# Patient Record
Sex: MALE | Race: WHITE | Employment: FULL TIME | URBAN - METROPOLITAN AREA
[De-identification: names, ages, dates, MRNs, and addresses within clinical notes are randomized per-mention and may not be internally consistent; named-entity substitution may affect disease eponyms.]

---

## 2017-01-10 ENCOUNTER — OFFICE VISIT (OUTPATIENT)
Dept: NEUROLOGY | Facility: CLINIC | Age: 52
End: 2017-01-10

## 2017-01-10 VITALS
BODY MASS INDEX: 29.52 KG/M2 | RESPIRATION RATE: 16 BRPM | DIASTOLIC BLOOD PRESSURE: 74 MMHG | WEIGHT: 230 LBS | HEIGHT: 74 IN | SYSTOLIC BLOOD PRESSURE: 124 MMHG | HEART RATE: 68 BPM

## 2017-01-10 DIAGNOSIS — G25.0 BENIGN ESSENTIAL TREMOR SYNDROME: Primary | ICD-10-CM

## 2017-01-10 PROCEDURE — 99213 OFFICE O/P EST LOW 20 MIN: CPT | Performed by: OTHER

## 2017-01-10 NOTE — PROGRESS NOTES
[facility] with [address]  7/13/1965  Primary Care Provider:  Eveline Lema MD    1/10/2017    45year old yo patient being seen for:  tremors    Responds to Alprazolam and k 7 days. , Disp: 135 tablet, Rfl: 1  PRN:     Allergies:  No Known Allergies      During today's visit, the following items were reviewed: medications, and the following relevant information are as noted in appropriate sections above and below.       EXAM:  B

## 2017-01-10 NOTE — PATIENT INSTRUCTIONS
Refill policies:    • Allow 2 business days for refills; controlled substances may take longer.   • Contact your pharmacy at least 5 days prior to running out of medication and have them send an electronic request or submit request through the “request re your physician has recommended that you have a procedure or additional testing performed. [facility]) will contact your insurance carrier to obtain pre-certification or prior authorization.     Unfortunately, MANDY has seen an increas

## 2017-01-27 PROCEDURE — 81003 URINALYSIS AUTO W/O SCOPE: CPT | Performed by: INTERNAL MEDICINE

## 2017-01-30 ENCOUNTER — APPOINTMENT (OUTPATIENT)
Dept: LAB | Age: 52
End: 2017-01-30
Attending: FAMILY MEDICINE
Payer: COMMERCIAL

## 2017-01-30 ENCOUNTER — OFFICE VISIT (OUTPATIENT)
Dept: FAMILY MEDICINE CLINIC | Facility: CLINIC | Age: 52
End: 2017-01-30

## 2017-01-30 VITALS
SYSTOLIC BLOOD PRESSURE: 118 MMHG | DIASTOLIC BLOOD PRESSURE: 82 MMHG | WEIGHT: 232 LBS | HEART RATE: 72 BPM | RESPIRATION RATE: 16 BRPM | HEIGHT: 74 IN | BODY MASS INDEX: 29.77 KG/M2 | TEMPERATURE: 98 F

## 2017-01-30 DIAGNOSIS — Z12.5 SCREENING FOR PROSTATE CANCER: ICD-10-CM

## 2017-01-30 DIAGNOSIS — G25.0 BENIGN ESSENTIAL TREMOR SYNDROME: ICD-10-CM

## 2017-01-30 DIAGNOSIS — Z13.220 SCREENING FOR LIPOID DISORDERS: ICD-10-CM

## 2017-01-30 DIAGNOSIS — L40.50 PSORIATIC ARTHROPATHY (HCC): ICD-10-CM

## 2017-01-30 DIAGNOSIS — Z00.00 ANNUAL PHYSICAL EXAM: Primary | ICD-10-CM

## 2017-01-30 DIAGNOSIS — L40.0 PLAQUE PSORIASIS: ICD-10-CM

## 2017-01-30 LAB
CHOLEST SMN-MCNC: 163 MG/DL (ref ?–200)
HDLC SERPL-MCNC: 47 MG/DL (ref 45–?)
HDLC SERPL: 3.47 {RATIO} (ref ?–4.97)
LDLC SERPL CALC-MCNC: 83 MG/DL (ref ?–130)
NONHDLC SERPL-MCNC: 116 MG/DL (ref ?–130)
PSA SERPL-MCNC: 1.17 NG/ML (ref 0.01–4)
TRIGLYCERIDES: 163 MG/DL (ref ?–150)
VLDL: 33 MG/DL (ref 5–40)

## 2017-01-30 PROCEDURE — 36415 COLL VENOUS BLD VENIPUNCTURE: CPT

## 2017-01-30 PROCEDURE — 99396 PREV VISIT EST AGE 40-64: CPT | Performed by: FAMILY MEDICINE

## 2017-01-30 PROCEDURE — 80061 LIPID PANEL: CPT

## 2017-01-30 PROCEDURE — 84153 ASSAY OF PSA TOTAL: CPT

## 2017-01-30 NOTE — PROGRESS NOTES
Peter Willoughby is a 45year old male who presents for a complete physical exam.   HPI:    Dr. Matt Knight managing thyroid. Repeat ultrasound pending in  6 months. Believes he is taking 2,000 IU vitamin d daily.      Wt Readings from Last 6 E daily. Disp: 90 tablet Rfl: 1   Lansoprazole 15 MG Oral Capsule Delayed Release Take 1 capsule (15 mg total) by mouth daily.  Disp: 90 capsule Rfl: 0   Loratadine (CLARITIN OR)  Disp:  Rfl:    Omega-3 Fatty Acids (FISH OIL OR) 1 qd Disp:  Rfl:    Cholecalci rashes,no suspicious lesions  HEENT: atraumatic, normocephalic,ears and throat are clear  EYES:PERRLA, EOMI, normal nondilated fundoscopic exam,conjunctiva are clear  NECK: supple,no adenopathy  LUNGS: clear to auscultation  CARDIO: RRR without murmur  GI:

## 2017-03-03 RX ORDER — TADALAFIL 10 MG
TABLET ORAL
Qty: 18 TABLET | Refills: 0 | Status: SHIPPED | OUTPATIENT
Start: 2017-03-03 | End: 2017-05-17

## 2017-04-28 ENCOUNTER — TELEPHONE (OUTPATIENT)
Dept: FAMILY MEDICINE CLINIC | Facility: CLINIC | Age: 52
End: 2017-04-28

## 2017-04-28 PROCEDURE — 81003 URINALYSIS AUTO W/O SCOPE: CPT | Performed by: INTERNAL MEDICINE

## 2017-05-16 DIAGNOSIS — G47.00 INSOMNIA, UNSPECIFIED: Primary | ICD-10-CM

## 2017-05-16 RX ORDER — ALPRAZOLAM 0.25 MG/1
TABLET ORAL
Qty: 40 TABLET | Refills: 0 | Status: SHIPPED | OUTPATIENT
Start: 2017-05-16 | End: 2017-12-02

## 2017-05-16 NOTE — TELEPHONE ENCOUNTER
From: Jackie Melgar  To:  Liat Restrepo MD  Sent: 5/16/2017 7:08 AM CDT  Subject: Medication Renewal Request    Original authorizing provider: MD Jackie Allan would like a refill of the following medications:  ALPRAZOLAM 0.25 MG

## 2017-05-16 NOTE — TELEPHONE ENCOUNTER
Medication: Alprazolam    Date of last refill: 12/01/16  [place] 86/36/61    Last office visit: 01/10/17  Due back to clinic per last office note:  1 year  Date next office visit scheduled:  No appointment scheduled at t

## 2017-05-17 RX ORDER — TADALAFIL 10 MG/1
10 TABLET ORAL
Qty: 18 TABLET | Refills: 0 | Status: SHIPPED | OUTPATIENT
Start: 2017-05-17 | End: 2017-08-08

## 2017-05-17 NOTE — TELEPHONE ENCOUNTER
From: Shaka Melgar  To: Nikolas Ariza MD  Sent: 5/16/2017 7:08 AM CDT  Subject: Medication Renewal Request    Original authorizing provider: MD Shaka Christopher would like a refill of the following medications:  CIALIS 10 MG Oral

## 2017-08-01 PROCEDURE — 81003 URINALYSIS AUTO W/O SCOPE: CPT | Performed by: INTERNAL MEDICINE

## 2017-08-10 RX ORDER — TADALAFIL 10 MG/1
10 TABLET ORAL
Qty: 18 TABLET | Refills: 0 | Status: SHIPPED
Start: 2017-08-10 | End: 2018-09-27

## 2017-08-10 NOTE — TELEPHONE ENCOUNTER
From: Edith Melgar  Sent: 8/8/2017 5:52 AM CDT  Subject: Medication Renewal Request    Edith Martin. Talia would like a refill of the following medications:   Tadalafil (CIALIS) 10 MG Oral Tab Iline Felty, MD]    Preferred pharmacy: [facility]/PHARMACY #[id number] -

## 2017-11-14 PROCEDURE — 81003 URINALYSIS AUTO W/O SCOPE: CPT | Performed by: INTERNAL MEDICINE

## 2017-12-02 DIAGNOSIS — G47.00 INSOMNIA, UNSPECIFIED: ICD-10-CM

## 2017-12-04 RX ORDER — ALPRAZOLAM 0.25 MG/1
TABLET ORAL
Qty: 40 TABLET | Refills: 0 | Status: SHIPPED
Start: 2017-12-04 | End: 2018-01-08

## 2017-12-04 NOTE — TELEPHONE ENCOUNTER
From: Cristy Smith  Sent: 12/2/2017 4:33 PM CST  Subject: Medication Renewal Request    Jamie Colvin.  Talia would like a refill of the following medications:     ALPRAZolam 0.25 MG Oral Tab Nicho Turner MD]    Preferred pharmacy: [facility]/PHARMACY #[id number] - [place]

## 2017-12-04 NOTE — TELEPHONE ENCOUNTER
Medication: Alprazolam 0.25 mg     Date of last refill: 05/16/17  [place] 36/81/69     Last office visit: 01/10/17  Due back to clinic per last office note:  1 year  Date next office visit scheduled:  No appointment sche

## 2017-12-05 NOTE — TELEPHONE ENCOUNTER
Prescription approved for Alprazolam and faxed to [facility] pharmacy with receipt confirmation.

## 2018-01-08 ENCOUNTER — OFFICE VISIT (OUTPATIENT)
Dept: NEUROLOGY | Facility: CLINIC | Age: 53
End: 2018-01-08

## 2018-01-08 VITALS
HEIGHT: 74 IN | RESPIRATION RATE: 16 BRPM | HEART RATE: 81 BPM | DIASTOLIC BLOOD PRESSURE: 83 MMHG | WEIGHT: 237 LBS | SYSTOLIC BLOOD PRESSURE: 127 MMHG | BODY MASS INDEX: 30.42 KG/M2

## 2018-01-08 DIAGNOSIS — G25.0 BENIGN ESSENTIAL TREMOR SYNDROME: Primary | ICD-10-CM

## 2018-01-08 PROCEDURE — 99214 OFFICE O/P EST MOD 30 MIN: CPT | Performed by: OTHER

## 2018-01-08 RX ORDER — ALPRAZOLAM 0.25 MG/1
TABLET ORAL
Qty: 60 TABLET | Refills: 5 | Status: SHIPPED | OUTPATIENT
Start: 2018-01-08 | End: 2019-11-14

## 2018-01-08 NOTE — PROGRESS NOTES
[facility] with [address]  7/13/1965  Primary Care Provider:  Jhonatan Mckee MD    1/8/2018    47year old yo patient being seen for:  tremors    Steady and no worsening, medi sounds  Pink nailbeds no Cyanosis, pulses palpated    Postural and kinetic tremors    IMPRESSION & PLANS:  Benign essential tremor syndrome  (primary encounter diagnosis)    Discussion on the case:  Stable and renew medications    Diagnostics:  none    Jemal

## 2018-01-16 ENCOUNTER — TELEPHONE (OUTPATIENT)
Dept: FAMILY MEDICINE CLINIC | Facility: CLINIC | Age: 53
End: 2018-01-16

## 2018-01-22 NOTE — TELEPHONE ENCOUNTER
Incoming fax received from [employer]. Additional information needed(questionier), form completed and faxed.

## 2018-01-24 NOTE — TELEPHONE ENCOUNTER
Incoming fax received from [employer]. Letter of determination. Cialis 10 mg approved coverage 1/22/2018 through 1/22/2021. Member id: [id number]  Detailed message left on patient voicemail.

## 2018-03-26 PROCEDURE — 81001 URINALYSIS AUTO W/SCOPE: CPT | Performed by: INTERNAL MEDICINE

## 2018-03-29 PROBLEM — M67.432 GANGLION CYST OF DORSUM OF LEFT WRIST: Status: ACTIVE | Noted: 2018-03-29

## 2018-04-11 PROCEDURE — 88304 TISSUE EXAM BY PATHOLOGIST: CPT | Performed by: ORTHOPAEDIC SURGERY

## 2018-05-04 RX ORDER — TADALAFIL 10 MG/1
10 TABLET ORAL
Qty: 18 TABLET | Refills: 0
Start: 2018-05-04

## 2018-06-06 ENCOUNTER — OFFICE VISIT (OUTPATIENT)
Dept: FAMILY MEDICINE CLINIC | Facility: CLINIC | Age: 53
End: 2018-06-06

## 2018-06-06 VITALS
TEMPERATURE: 97 F | SYSTOLIC BLOOD PRESSURE: 130 MMHG | BODY MASS INDEX: 32 KG/M2 | OXYGEN SATURATION: 97 % | HEART RATE: 74 BPM | WEIGHT: 252 LBS | RESPIRATION RATE: 16 BRPM | DIASTOLIC BLOOD PRESSURE: 82 MMHG

## 2018-06-06 DIAGNOSIS — H00.014 HORDEOLUM EXTERNUM OF LEFT UPPER EYELID: Primary | ICD-10-CM

## 2018-06-06 PROCEDURE — 99213 OFFICE O/P EST LOW 20 MIN: CPT | Performed by: FAMILY MEDICINE

## 2018-06-06 RX ORDER — ERYTHROMYCIN 5 MG/G
OINTMENT OPHTHALMIC
Qty: 1 TUBE | Refills: 0 | Status: SHIPPED | OUTPATIENT
Start: 2018-06-06 | End: 2018-07-12

## 2018-06-06 RX ORDER — DICLOFENAC SODIUM 75 MG/1
75 TABLET, DELAYED RELEASE ORAL DAILY
COMMUNITY
End: 2019-02-25

## 2018-06-07 NOTE — PROGRESS NOTES
[facility] Office Note  Chief Complaint:   Patient presents with:   Eye Visual Problem (opthalmic): left eye x 1 day  Body ache and/or chills: x 1 day      HPI:   This is a 47year old male coming in for a stye in the left upper ey EVERY 7 DAYS.  Disp: 135 tablet Rfl: 0   ALPRAZolam 0.25 MG Oral Tab Take 1 tablet by mouth twice a day as needed for sleep Disp: 60 tablet Rfl: 5   Tadalafil (CIALIS) 10 MG Oral Tab Take 1 tablet (10 mg total) by mouth daily as needed for Erectile Dysfunct ulcerations, no dental abnormalities noted.   LUNGS: clear to auscultation bilaterally, no rales/rhonchi/wheezing  HEART:  Regular rate and rhythm, no murmurs, rubs or gallops  ABDOMEN:  Soft, nondistended, nontender, bowel sounds normal in all 4 quadrants, GANGLION / DOS 04/11/18 / EXP 07/10/18      DARREN VILLARREAL DO  6/7/2018  6:56 AM

## 2018-07-12 ENCOUNTER — OFFICE VISIT (OUTPATIENT)
Dept: NEUROLOGY | Facility: CLINIC | Age: 53
End: 2018-07-12

## 2018-07-12 VITALS
HEIGHT: 74 IN | HEART RATE: 72 BPM | BODY MASS INDEX: 32.34 KG/M2 | WEIGHT: 252 LBS | RESPIRATION RATE: 16 BRPM | DIASTOLIC BLOOD PRESSURE: 81 MMHG | SYSTOLIC BLOOD PRESSURE: 129 MMHG

## 2018-07-12 DIAGNOSIS — G25.0 BENIGN ESSENTIAL TREMOR SYNDROME: Primary | ICD-10-CM

## 2018-07-12 PROCEDURE — 99214 OFFICE O/P EST MOD 30 MIN: CPT | Performed by: OTHER

## 2018-07-12 RX ORDER — ZONISAMIDE 25 MG/1
25 CAPSULE ORAL DAILY
Qty: 60 CAPSULE | Refills: 5 | Status: SHIPPED | OUTPATIENT
Start: 2018-07-12 | End: 2019-07-08

## 2018-07-12 NOTE — PROGRESS NOTES
[employer] with [address]  7/13/1965  Primary Care Provider:  Harvinder Flores MD    7/12/2018  Accompanied visit:  (x) No ( ) yes    47year old yo patient being seen for:  Jemal sections above and below.       EXAM:  /81 (BP Location: Left arm, Patient Position: Sitting, Cuff Size: large)   Pulse 72   Resp 16   Ht 74\"   Wt 252 lb   BMI 32.35 kg/m²   Looks stated age  Pink conjunctiva anicteric sclerae, moist mucosa  No LAD, [place]  7/12/2018, Time completed 11:09 AM    Decision making:  (  ) labs reviewed/ordered - 1  (  ) new diagnosis: - 1  (  ) Images & studies independently reviewed -non F2F  (  ) Case/studies discussed with other caregivers - -non F2F  (  ) Telephone

## 2018-07-12 NOTE — PATIENT INSTRUCTIONS
Refill policies:    • Allow 2-3 business days for refills; controlled substances may take longer.   • Contact your pharmacy at least 5 days prior to running out of medication and have them send an electronic request or submit request through the “request re entire amount billed. Precertification and Prior Authorizations: If your physician has recommended that you have a procedure or additional testing performed.   [facility]) will contact your insurance carrier to obtain pre-certi

## 2018-07-19 ENCOUNTER — HOSPITAL ENCOUNTER (OUTPATIENT)
Age: 53
Discharge: HOME | End: 2018-07-19
Payer: COMMERCIAL

## 2018-07-19 VITALS
BODY MASS INDEX: 31 KG/M2 | TEMPERATURE: 98 F | SYSTOLIC BLOOD PRESSURE: 124 MMHG | OXYGEN SATURATION: 97 % | DIASTOLIC BLOOD PRESSURE: 93 MMHG | HEART RATE: 78 BPM | WEIGHT: 240 LBS | RESPIRATION RATE: 16 BRPM

## 2018-07-19 DIAGNOSIS — K12.2 UVULITIS: Primary | ICD-10-CM

## 2018-07-19 LAB — POCT RAPID STREP: NEGATIVE

## 2018-07-19 PROCEDURE — 87081 CULTURE SCREEN ONLY: CPT | Performed by: PHYSICIAN ASSISTANT

## 2018-07-19 PROCEDURE — 86665 EPSTEIN-BARR CAPSID VCA: CPT | Performed by: PHYSICIAN ASSISTANT

## 2018-07-19 PROCEDURE — 99214 OFFICE O/P EST MOD 30 MIN: CPT

## 2018-07-19 PROCEDURE — 36415 COLL VENOUS BLD VENIPUNCTURE: CPT

## 2018-07-19 PROCEDURE — 87430 STREP A AG IA: CPT | Performed by: PHYSICIAN ASSISTANT

## 2018-07-19 RX ORDER — METHYLPREDNISOLONE 4 MG/1
TABLET ORAL
Qty: 1 PACKAGE | Refills: 0 | Status: SHIPPED | OUTPATIENT
Start: 2018-07-19 | End: 2018-07-31 | Stop reason: ALTCHOICE

## 2018-07-19 RX ORDER — AMOXICILLIN 875 MG/1
875 TABLET, COATED ORAL 2 TIMES DAILY
Qty: 20 TABLET | Refills: 0 | Status: SHIPPED | OUTPATIENT
Start: 2018-07-19 | End: 2018-07-29

## 2018-07-19 NOTE — ED INITIAL ASSESSMENT (HPI)
Patient reports that he has had a persistent sore throat as well as other symptoms. Patient reports he has been feeling tired, and has also had some ear issues. Patient reports he has had symptoms for about 9 days.  Patient reports he had a strep culture

## 2018-07-19 NOTE — ED PROVIDER NOTES
Patient Seen in: [facility]    History   Patient presents with:  Sore Throat    Stated Complaint: sore throat x 9 days    HPI    Vane Camejo is a 25-year-old male who presents today for evaluation of sore throat x 9 days.   He has a pas %  O2 Device: None (Room air)    Current:BP (!) 124/93   Pulse 78   Temp 97.9 °F (36.6 °C) (Temporal)   Resp 16   Wt 108.9 kg   SpO2 97%   BMI 30.81 kg/m²         Physical Exam   Constitutional: He is oriented to person, place, and time.  He appears well-de discharge instructions are given and discussed. Discharge medications are discussed. The patient is in good condition throughout the visit today and remains so upon discharge. I discuss the plan of care with the patient, who expresses understanding.   All

## 2018-07-23 LAB
EBV VCA IGG: POSITIVE
EBV VCA IGM: NEGATIVE

## 2018-07-31 PROBLEM — S20.211A CONTUSION OF RIB ON RIGHT SIDE, INITIAL ENCOUNTER: Status: ACTIVE | Noted: 2018-07-31

## 2018-07-31 PROBLEM — S43.61XA SPRAIN OF RIGHT STERNOCLAVICULAR JOINT, INITIAL ENCOUNTER: Status: ACTIVE | Noted: 2018-07-31

## 2018-08-07 ENCOUNTER — OFFICE VISIT (OUTPATIENT)
Dept: FAMILY MEDICINE CLINIC | Facility: CLINIC | Age: 53
End: 2018-08-07
Payer: COMMERCIAL

## 2018-08-07 ENCOUNTER — LAB ENCOUNTER (OUTPATIENT)
Dept: LAB | Age: 53
End: 2018-08-07
Attending: FAMILY MEDICINE
Payer: COMMERCIAL

## 2018-08-07 VITALS
SYSTOLIC BLOOD PRESSURE: 128 MMHG | RESPIRATION RATE: 16 BRPM | WEIGHT: 249 LBS | HEART RATE: 69 BPM | TEMPERATURE: 98 F | BODY MASS INDEX: 32 KG/M2 | DIASTOLIC BLOOD PRESSURE: 84 MMHG

## 2018-08-07 DIAGNOSIS — Z79.899 ENCOUNTER FOR LONG-TERM (CURRENT) USE OF HIGH-RISK MEDICATION: ICD-10-CM

## 2018-08-07 DIAGNOSIS — J35.3 HYPERTROPHY OF TONSILS WITH HYPERTROPHY OF ADENOIDS: Primary | ICD-10-CM

## 2018-08-07 DIAGNOSIS — J35.3 HYPERTROPHY OF TONSILS WITH HYPERTROPHY OF ADENOIDS: ICD-10-CM

## 2018-08-07 DIAGNOSIS — L40.50 PSORIATIC ARTHROPATHY (HCC): ICD-10-CM

## 2018-08-07 DIAGNOSIS — Z92.29 PERSONAL HISTORY OF HIGH RISK MEDICATION TREATMENT: ICD-10-CM

## 2018-08-07 LAB
ALBUMIN SERPL-MCNC: 3.9 G/DL (ref 3.5–4.8)
ALBUMIN/GLOB SERPL: 1.1 {RATIO} (ref 1–2)
ALP LIVER SERPL-CCNC: 84 U/L (ref 45–117)
ALT SERPL-CCNC: 78 U/L (ref 17–63)
ANION GAP SERPL CALC-SCNC: 6 MMOL/L (ref 0–18)
AST SERPL-CCNC: 36 U/L (ref 15–41)
BILIRUB SERPL-MCNC: 0.8 MG/DL (ref 0.1–2)
BUN BLD-MCNC: 15 MG/DL (ref 8–20)
BUN/CREAT SERPL: 12.1 (ref 10–20)
CALCIUM BLD-MCNC: 9.1 MG/DL (ref 8.3–10.3)
CHLORIDE SERPL-SCNC: 108 MMOL/L (ref 101–111)
CO2 SERPL-SCNC: 27 MMOL/L (ref 22–32)
CREAT BLD-MCNC: 1.24 MG/DL (ref 0.7–1.3)
CRP SERPL-MCNC: <0.29 MG/DL (ref ?–1)
GLOBULIN PLAS-MCNC: 3.6 G/DL (ref 2.5–3.7)
GLUCOSE BLD-MCNC: 91 MG/DL (ref 70–99)
LDH: 246 U/L (ref 84–249)
M PROTEIN MFR SERPL ELPH: 7.5 G/DL (ref 6.1–8.3)
OSMOLALITY SERPL CALC.SUM OF ELEC: 292 MOSM/KG (ref 275–295)
POTASSIUM SERPL-SCNC: 4 MMOL/L (ref 3.6–5.1)
SODIUM SERPL-SCNC: 141 MMOL/L (ref 136–144)

## 2018-08-07 PROCEDURE — 99213 OFFICE O/P EST LOW 20 MIN: CPT | Performed by: FAMILY MEDICINE

## 2018-08-07 PROCEDURE — 83615 LACTATE (LD) (LDH) ENZYME: CPT | Performed by: FAMILY MEDICINE

## 2018-08-07 RX ORDER — CETIRIZINE HYDROCHLORIDE 5 MG/1
5 TABLET ORAL DAILY
COMMUNITY

## 2018-08-07 NOTE — PROGRESS NOTES
Amanda Malloy is a 46year old male. HPI:   Patient is a 62-year-old male who has had throat symptoms for 2-3 weeks. He had been seen at the end of July and had a strep test that was negative and EBV IgM IgG testing was negative for acute infection.   He otherwise  SKIN: denies any unusual skin lesions or rashes  RESPIRATORY: denies shortness of breath with exertion  CARDIOVASCULAR: denies chest pain on exertion  GI: denies abdominal pain and denies heartburn  NEURO: denies headaches    EXAM:   /84

## 2018-08-08 LAB
BASOPHILS # BLD AUTO: 0.05 X10(3) UL (ref 0–0.1)
BASOPHILS NFR BLD AUTO: 0.7 %
BILIRUB UR QL STRIP.AUTO: NEGATIVE
CLARITY UR REFRACT.AUTO: CLEAR
COLOR UR AUTO: YELLOW
EOSINOPHIL # BLD AUTO: 0.25 X10(3) UL (ref 0–0.3)
EOSINOPHIL NFR BLD AUTO: 3.6 %
ERYTHROCYTE [DISTWIDTH] IN BLOOD BY AUTOMATED COUNT: 13.8 % (ref 11.5–16)
GLUCOSE UR STRIP.AUTO-MCNC: NEGATIVE MG/DL
HCT VFR BLD AUTO: 48.3 % (ref 37–53)
HGB BLD-MCNC: 16.6 G/DL (ref 13–17)
IMMATURE GRANULOCYTE COUNT: 0.01 X10(3) UL (ref 0–1)
IMMATURE GRANULOCYTE RATIO %: 0.1 %
KETONES UR STRIP.AUTO-MCNC: NEGATIVE MG/DL
LEUKOCYTE ESTERASE UR QL STRIP.AUTO: NEGATIVE
LYMPHOCYTES # BLD AUTO: 2.4 X10(3) UL (ref 0.9–4)
LYMPHOCYTES NFR BLD AUTO: 34.6 %
MCH RBC QN AUTO: 32.6 PG (ref 27–33.2)
MCHC RBC AUTO-ENTMCNC: 34.4 G/DL (ref 31–37)
MCV RBC AUTO: 94.9 FL (ref 80–99)
MONOCYTES # BLD AUTO: 0.68 X10(3) UL (ref 0.1–1)
MONOCYTES NFR BLD AUTO: 9.8 %
NEUTROPHIL ABS PRELIM: 3.55 X10 (3) UL (ref 1.3–6.7)
NEUTROPHILS # BLD AUTO: 3.55 X10(3) UL (ref 1.3–6.7)
NEUTROPHILS NFR BLD AUTO: 51.2 %
NITRITE UR QL STRIP.AUTO: NEGATIVE
PH UR STRIP.AUTO: 5 [PH] (ref 4.5–8)
PLATELET # BLD AUTO: 259 10(3)UL (ref 150–450)
PROT UR STRIP.AUTO-MCNC: NEGATIVE MG/DL
RBC # BLD AUTO: 5.09 X10(6)UL (ref 4.3–5.7)
RBC UR QL AUTO: NEGATIVE
RED CELL DISTRIBUTION WIDTH-SD: 47.5 FL (ref 35.1–46.3)
SED RATE-ML: 6 MM/HR (ref 0–12)
SP GR UR STRIP.AUTO: 1.03 (ref 1–1.03)
UROBILINOGEN UR STRIP.AUTO-MCNC: <2 MG/DL
WBC # BLD AUTO: 6.9 X10(3) UL (ref 4–13)

## 2018-08-08 NOTE — PROGRESS NOTES
no inflammation, no serious medication side effects  single liver function test is mildly elevated - not serious   no changes in medications recommended for now

## 2018-08-11 ENCOUNTER — HOSPITAL ENCOUNTER (OUTPATIENT)
Dept: CT IMAGING | Facility: HOSPITAL | Age: 53
Discharge: HOME | End: 2018-08-11
Attending: FAMILY MEDICINE
Payer: COMMERCIAL

## 2018-08-11 DIAGNOSIS — J35.3 HYPERTROPHY OF TONSILS WITH HYPERTROPHY OF ADENOIDS: ICD-10-CM

## 2018-08-11 PROCEDURE — 70491 CT SOFT TISSUE NECK W/DYE: CPT | Performed by: FAMILY MEDICINE

## 2018-08-14 DIAGNOSIS — J35.3 HYPERTROPHY OF TONSILS WITH HYPERTROPHY OF ADENOIDS: Primary | ICD-10-CM

## 2018-08-30 ENCOUNTER — HOSPITAL ENCOUNTER (OUTPATIENT)
Dept: ULTRASOUND IMAGING | Facility: HOSPITAL | Age: 53
Discharge: HOME | End: 2018-08-30
Attending: OTOLARYNGOLOGY
Payer: COMMERCIAL

## 2018-08-30 DIAGNOSIS — E07.9 THYROID DYSFUNCTION: ICD-10-CM

## 2018-08-30 DIAGNOSIS — E04.1 THYROID NODULE: ICD-10-CM

## 2018-08-30 PROCEDURE — 76536 US EXAM OF HEAD AND NECK: CPT | Performed by: OTOLARYNGOLOGY

## 2018-09-18 ENCOUNTER — TELEPHONE (OUTPATIENT)
Dept: FAMILY MEDICINE CLINIC | Facility: CLINIC | Age: 53
End: 2018-09-18

## 2018-09-18 NOTE — TELEPHONE ENCOUNTER
[facility].  Patient is scheduled to have Tonsillectomy and Uvulectomy with Dr. José Luis Caba on 10/5/2018. Outgoing call to patient, left message to call back.

## 2018-09-27 ENCOUNTER — OFFICE VISIT (OUTPATIENT)
Dept: FAMILY MEDICINE CLINIC | Facility: CLINIC | Age: 53
End: 2018-09-27
Payer: COMMERCIAL

## 2018-09-27 VITALS
SYSTOLIC BLOOD PRESSURE: 128 MMHG | HEIGHT: 74 IN | HEART RATE: 72 BPM | WEIGHT: 249 LBS | RESPIRATION RATE: 16 BRPM | TEMPERATURE: 98 F | BODY MASS INDEX: 31.95 KG/M2 | DIASTOLIC BLOOD PRESSURE: 84 MMHG

## 2018-09-27 DIAGNOSIS — J35.3 HYPERTROPHY OF TONSILS WITH HYPERTROPHY OF ADENOIDS: ICD-10-CM

## 2018-09-27 DIAGNOSIS — L40.50 PSORIATIC ARTHROPATHY (HCC): ICD-10-CM

## 2018-09-27 DIAGNOSIS — Z01.818 PREOPERATIVE CLEARANCE: Primary | ICD-10-CM

## 2018-09-27 DIAGNOSIS — L40.0 PLAQUE PSORIASIS: ICD-10-CM

## 2018-09-27 DIAGNOSIS — N52.8 OTHER MALE ERECTILE DYSFUNCTION: ICD-10-CM

## 2018-09-27 PROCEDURE — 99243 OFF/OP CNSLTJ NEW/EST LOW 30: CPT | Performed by: FAMILY MEDICINE

## 2018-09-27 RX ORDER — TADALAFIL 10 MG/1
10 TABLET ORAL
Qty: 18 TABLET | Refills: 0 | Status: SHIPPED | OUTPATIENT
Start: 2018-09-27 | End: 2018-12-28

## 2018-09-27 NOTE — PROGRESS NOTES
Soledad Gaines is a 46year old male who presents for a pre-operative physical exam. Patient is to have tonsillectomy and uvulectomy, to be done by Dr. Ki Herman at [facility] on 10/5/2018.       HPI:   Pt complains of ADALBERTO and hypertrophy of the tonsils OTHER SURGICAL HISTORY      Comment:  Upper Gastrointestinal Endoscopy   No family history on file. Social History:   Social History    Tobacco Use      Smoking status: Never Smoker      Smokeless tobacco: Never Used    Alcohol use:  Yes      Alcohol/week

## 2018-10-05 ENCOUNTER — HOSPITAL ENCOUNTER (OUTPATIENT)
Facility: HOSPITAL | Age: 53
Setting detail: HOSPITAL OUTPATIENT SURGERY
Discharge: HOME | End: 2018-10-05
Attending: OTOLARYNGOLOGY | Admitting: OTOLARYNGOLOGY
Payer: COMMERCIAL

## 2018-10-05 ENCOUNTER — ANESTHESIA EVENT (OUTPATIENT)
Dept: SURGERY | Facility: HOSPITAL | Age: 53
End: 2018-10-05
Payer: COMMERCIAL

## 2018-10-05 ENCOUNTER — ANESTHESIA (OUTPATIENT)
Dept: SURGERY | Facility: HOSPITAL | Age: 53
End: 2018-10-05
Payer: COMMERCIAL

## 2018-10-05 VITALS
WEIGHT: 246.94 LBS | DIASTOLIC BLOOD PRESSURE: 85 MMHG | HEIGHT: 74 IN | BODY MASS INDEX: 31.69 KG/M2 | OXYGEN SATURATION: 95 % | RESPIRATION RATE: 18 BRPM | SYSTOLIC BLOOD PRESSURE: 144 MMHG | HEART RATE: 73 BPM | TEMPERATURE: 98 F

## 2018-10-05 DIAGNOSIS — J35.1 HYPERTROPHY OF TONSILS ALONE: ICD-10-CM

## 2018-10-05 DIAGNOSIS — Q38.5: ICD-10-CM

## 2018-10-05 DIAGNOSIS — G47.33 OBSTRUCTIVE SLEEP APNEA (ADULT) (PEDIATRIC): ICD-10-CM

## 2018-10-05 PROCEDURE — 88184 FLOWCYTOMETRY/ TC 1 MARKER: CPT | Performed by: OTOLARYNGOLOGY

## 2018-10-05 PROCEDURE — 0C5PXZZ DESTRUCTION OF TONSILS, EXTERNAL APPROACH: ICD-10-PCS | Performed by: OTOLARYNGOLOGY

## 2018-10-05 PROCEDURE — 88305 TISSUE EXAM BY PATHOLOGIST: CPT | Performed by: OTOLARYNGOLOGY

## 2018-10-05 PROCEDURE — 88185 FLOWCYTOMETRY/TC ADD-ON: CPT | Performed by: OTOLARYNGOLOGY

## 2018-10-05 PROCEDURE — 88333 PATH CONSLTJ SURG CYTO XM 1: CPT | Performed by: OTOLARYNGOLOGY

## 2018-10-05 PROCEDURE — 88304 TISSUE EXAM BY PATHOLOGIST: CPT | Performed by: OTOLARYNGOLOGY

## 2018-10-05 PROCEDURE — 0CBNXZZ EXCISION OF UVULA, EXTERNAL APPROACH: ICD-10-PCS | Performed by: OTOLARYNGOLOGY

## 2018-10-05 PROCEDURE — 88307 TISSUE EXAM BY PATHOLOGIST: CPT | Performed by: OTOLARYNGOLOGY

## 2018-10-05 RX ORDER — SODIUM CHLORIDE, SODIUM LACTATE, POTASSIUM CHLORIDE, CALCIUM CHLORIDE 600; 310; 30; 20 MG/100ML; MG/100ML; MG/100ML; MG/100ML
INJECTION, SOLUTION INTRAVENOUS CONTINUOUS
Status: DISCONTINUED | OUTPATIENT
Start: 2018-10-05 | End: 2018-10-05

## 2018-10-05 RX ORDER — NALOXONE HYDROCHLORIDE 0.4 MG/ML
80 INJECTION, SOLUTION INTRAMUSCULAR; INTRAVENOUS; SUBCUTANEOUS AS NEEDED
Status: DISCONTINUED | OUTPATIENT
Start: 2018-10-05 | End: 2018-10-05

## 2018-10-05 RX ORDER — BUPIVACAINE HYDROCHLORIDE 5 MG/ML
INJECTION, SOLUTION EPIDURAL; INTRACAUDAL AS NEEDED
Status: DISCONTINUED | OUTPATIENT
Start: 2018-10-05 | End: 2018-10-05 | Stop reason: HOSPADM

## 2018-10-05 RX ORDER — ACETAMINOPHEN 500 MG
1000 TABLET ORAL ONCE AS NEEDED
Status: DISCONTINUED | OUTPATIENT
Start: 2018-10-05 | End: 2018-10-05

## 2018-10-05 RX ORDER — ACETAMINOPHEN 500 MG
500 TABLET ORAL EVERY 6 HOURS PRN
COMMUNITY
End: 2018-11-12

## 2018-10-05 RX ORDER — MORPHINE SULFATE 4 MG/ML
2 INJECTION, SOLUTION INTRAMUSCULAR; INTRAVENOUS EVERY 5 MIN PRN
Status: DISCONTINUED | OUTPATIENT
Start: 2018-10-05 | End: 2018-10-05

## 2018-10-05 RX ORDER — MORPHINE SULFATE 4 MG/ML
INJECTION, SOLUTION INTRAMUSCULAR; INTRAVENOUS
Status: COMPLETED
Start: 2018-10-05 | End: 2018-10-05

## 2018-10-05 RX ORDER — ACETAMINOPHEN 500 MG
1000 TABLET ORAL ONCE
Status: DISCONTINUED | OUTPATIENT
Start: 2018-10-05 | End: 2018-10-05 | Stop reason: HOSPADM

## 2018-10-05 NOTE — ANESTHESIA POSTPROCEDURE EVALUATION
[address] [facility] Patient Status:  Hospital Outpatient Surgery   Age/Gender 46year old male MRN [id number]   [facility] SURGERY Attending Kimberil Lim MD   Hosp Day # 0 PCP Prateek Lema MD       Anesthesia Post-op No

## 2018-10-05 NOTE — ANESTHESIA PREPROCEDURE EVALUATION
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 Exercise tolerance: good     MET: >4                                           Endo/Other    Negative endo/other ROS.                               Pu patient meets guidelines. Patient has not taken beta blockers in last 24 hours. Post-procedure pain management plan discussed with surgeon and patient.     Comment: Risks and benefits of GA including sorethroat,allergy,nausea, vomiting,dental trauma,pain

## 2018-10-05 NOTE — OPERATIVE REPORT
[facility]    PATIENT'S NAME: [email or web address], NITO CHARLES   ATTENDING PHYSICIAN: Chris Morse M.D. OPERATING PHYSICIAN: Chris Morse M.D.    PATIENT ACCOUNT#:   [de-identified]    LOCATION:  PREOPASCC  PRE ASCC 1 EDWP 10  MEDICAL RECORD #:   [id number] awoken from anesthetic and brought to recovery room in stable condition.     Dictated By Grupo Kuhn M.D.  d: 10/05/2018 11:14:46  t: 10/05/2018 11:59:58  [facility] [id number]  JJD/    cc: Grupo Kuhn M.D.

## 2018-10-05 NOTE — H&P
No change to h and p   Proceed with tonsil and uvulectomy  Risk and benefits discussed  Lung cta   Heart rrr

## 2018-10-05 NOTE — BRIEF OP NOTE
Pre-Operative Diagnosis: Uvular hypertrophy ; tonsil hypertrophy     Post-Operative Diagnosis: same      Procedure Performed:   Procedure(s):  BILATERAL TONSILLECTOMY AND UVULECTOMY    Surgeon(s) and Role:     Titi Carroll MD - Primary    Assistant(

## 2018-11-03 PROCEDURE — 81001 URINALYSIS AUTO W/SCOPE: CPT | Performed by: INTERNAL MEDICINE

## 2018-11-03 PROCEDURE — 86480 TB TEST CELL IMMUN MEASURE: CPT | Performed by: INTERNAL MEDICINE

## 2018-12-18 ENCOUNTER — OFFICE VISIT (OUTPATIENT)
Dept: FAMILY MEDICINE CLINIC | Facility: CLINIC | Age: 53
End: 2018-12-18
Payer: COMMERCIAL

## 2018-12-18 VITALS
HEART RATE: 60 BPM | TEMPERATURE: 98 F | BODY MASS INDEX: 31.95 KG/M2 | SYSTOLIC BLOOD PRESSURE: 120 MMHG | DIASTOLIC BLOOD PRESSURE: 76 MMHG | RESPIRATION RATE: 12 BRPM | WEIGHT: 249 LBS | HEIGHT: 74 IN

## 2018-12-18 DIAGNOSIS — R06.81 APNEA: ICD-10-CM

## 2018-12-18 DIAGNOSIS — Z13.220 SCREENING FOR LIPOID DISORDERS: ICD-10-CM

## 2018-12-18 DIAGNOSIS — L40.0 PLAQUE PSORIASIS: ICD-10-CM

## 2018-12-18 DIAGNOSIS — N52.8 OTHER MALE ERECTILE DYSFUNCTION: ICD-10-CM

## 2018-12-18 DIAGNOSIS — L40.50 PSORIATIC ARTHROPATHY (HCC): ICD-10-CM

## 2018-12-18 DIAGNOSIS — Z00.00 ANNUAL PHYSICAL EXAM: Primary | ICD-10-CM

## 2018-12-18 DIAGNOSIS — Z12.5 SCREENING FOR PROSTATE CANCER: ICD-10-CM

## 2018-12-18 PROCEDURE — 99396 PREV VISIT EST AGE 40-64: CPT | Performed by: FAMILY MEDICINE

## 2018-12-18 RX ORDER — INFLUENZA A VIRUS A/MICHIGAN/45/2015 X-275 (H1N1) ANTIGEN (FORMALDEHYDE INACTIVATED), INFLUENZA A VIRUS A/HONG KONG/4801/2014 X-263B (H3N2) ANTIGEN (FORMALDEHYDE INACTIVATED), INFLUENZA B VIRUS B/PHUKET/3073/2013 ANTIGEN (FORMALDEHYDE INACTIVATED), AND INFLUENZA B VIRUS B/BRISBANE/60/2008 ANTIGEN (FORMALDEHYDE INACTIVATED) 7.5; 7.5; 7.5; 7.5 UG/.25ML; UG/.25ML; UG/.25ML; UG/.25ML
INJECTION, SUSPENSION INTRAMUSCULAR
COMMUNITY
Start: 2018-09-15 | End: 2019-02-25

## 2018-12-18 NOTE — PROGRESS NOTES
Shanna Patton is a 46year old male who presents for a complete physical exam.   HPI:    Dr. Ki Herman managing thyroid. He had recent tonsillectomy and adenoidectomy for ADALBERTO. His wife has mentioned that his apneic periods seem less.   Hi (25 mg total) by mouth daily. Disp: 60 capsule Rfl: 5   Diclofenac Sodium 75 MG Oral Tab EC Take 75 mg by mouth daily. Disp:  Rfl:    Omeprazole Magnesium (PRILOSEC OTC) 20 MG Oral Tab EC Take 20 mg by mouth daily.  Disp:  Rfl:    ALPRAZolam 0.25 MG Oral headaches  PSYCHE: denies depression or anxiety  HEMATOLOGIC: denies hx of anemia  ENDOCRINE: denies thyroid history  ALL/ASTHMA: denies hx of allergy or asthma    EXAM:   /76   Pulse 60   Temp 97.7 °F (36.5 °C) (Oral)   Resp 12   Ht 74\"   Wt 249 lb

## 2018-12-28 DIAGNOSIS — N52.8 OTHER MALE ERECTILE DYSFUNCTION: ICD-10-CM

## 2018-12-29 ENCOUNTER — LAB ENCOUNTER (OUTPATIENT)
Dept: LAB | Age: 53
End: 2018-12-29
Attending: FAMILY MEDICINE
Payer: COMMERCIAL

## 2018-12-29 DIAGNOSIS — R73.9 HYPERGLYCEMIA: ICD-10-CM

## 2018-12-29 DIAGNOSIS — R06.81 APNEA: ICD-10-CM

## 2018-12-29 DIAGNOSIS — E07.9 THYROID DYSFUNCTION: ICD-10-CM

## 2018-12-29 DIAGNOSIS — L40.50 PSORIATIC ARTHROPATHY (HCC): ICD-10-CM

## 2018-12-29 DIAGNOSIS — L40.0 PLAQUE PSORIASIS: ICD-10-CM

## 2018-12-29 DIAGNOSIS — Z13.220 SCREENING FOR LIPOID DISORDERS: ICD-10-CM

## 2018-12-29 DIAGNOSIS — Z12.5 SCREENING FOR PROSTATE CANCER: ICD-10-CM

## 2018-12-29 DIAGNOSIS — R73.9 HYPERGLYCEMIA: Primary | ICD-10-CM

## 2018-12-29 PROCEDURE — 36415 COLL VENOUS BLD VENIPUNCTURE: CPT | Performed by: FAMILY MEDICINE

## 2018-12-29 PROCEDURE — 85025 COMPLETE CBC W/AUTO DIFF WBC: CPT | Performed by: FAMILY MEDICINE

## 2018-12-29 PROCEDURE — 80053 COMPREHEN METABOLIC PANEL: CPT | Performed by: FAMILY MEDICINE

## 2018-12-29 PROCEDURE — 80061 LIPID PANEL: CPT | Performed by: FAMILY MEDICINE

## 2018-12-29 PROCEDURE — 83036 HEMOGLOBIN GLYCOSYLATED A1C: CPT | Performed by: FAMILY MEDICINE

## 2018-12-29 PROCEDURE — 84153 ASSAY OF PSA TOTAL: CPT | Performed by: FAMILY MEDICINE

## 2019-01-02 ENCOUNTER — PATIENT MESSAGE (OUTPATIENT)
Dept: FAMILY MEDICINE CLINIC | Facility: CLINIC | Age: 54
End: 2019-01-02

## 2019-01-02 RX ORDER — TADALAFIL 10 MG/1
10 TABLET ORAL
Qty: 18 TABLET | Refills: 0 | Status: SHIPPED | OUTPATIENT
Start: 2019-01-02 | End: 2019-03-22

## 2019-01-02 NOTE — TELEPHONE ENCOUNTER
Tadalafil (CIALIS) 10 MG Oral Tab    Non protocol medication. Please see pended medications. Please sign & print if appropriate. Thank you    Last refill was sent on  09/27/2018. .. His last OV was on 12/18/2018.

## 2019-02-21 PROCEDURE — 81003 URINALYSIS AUTO W/O SCOPE: CPT | Performed by: INTERNAL MEDICINE

## 2019-03-22 DIAGNOSIS — N52.8 OTHER MALE ERECTILE DYSFUNCTION: ICD-10-CM

## 2019-03-25 RX ORDER — TADALAFIL 10 MG/1
10 TABLET ORAL
Qty: 18 TABLET | Refills: 0 | Status: SHIPPED | OUTPATIENT
Start: 2019-03-25 | End: 2019-06-19

## 2019-04-04 ENCOUNTER — OFFICE VISIT (OUTPATIENT)
Dept: NEUROLOGY | Facility: CLINIC | Age: 54
End: 2019-04-04
Payer: COMMERCIAL

## 2019-04-04 VITALS
DIASTOLIC BLOOD PRESSURE: 80 MMHG | HEART RATE: 78 BPM | RESPIRATION RATE: 16 BRPM | SYSTOLIC BLOOD PRESSURE: 130 MMHG | WEIGHT: 244 LBS | BODY MASS INDEX: 31.32 KG/M2 | HEIGHT: 74 IN

## 2019-04-04 DIAGNOSIS — G25.0 BENIGN ESSENTIAL TREMOR SYNDROME: Primary | ICD-10-CM

## 2019-04-04 PROCEDURE — 99214 OFFICE O/P EST MOD 30 MIN: CPT | Performed by: OTHER

## 2019-04-04 RX ORDER — MULTIVITAMIN
TABLET ORAL
COMMUNITY

## 2019-04-04 NOTE — PROGRESS NOTES
[facility] with [address]  7/13/1965  Primary Care Provider:  Carmita Crawford MD    4/4/2019  Accompanied visit:  (x) No ( ) yes, by:      46year old yo patient being seen for 244 lb   BMI 31.33 kg/m²   Looks stated age  Pink conjunctiva anicteric sclerae, moist mucosa  No LAD, neck supple  Lungs clear breath sounds  Heart S1S2, no abnormal sounds  Pink nailbeds no Cyanosis, pulses palpated    NEURO  Very mild postural tremor re

## 2019-05-03 ENCOUNTER — OFFICE VISIT (OUTPATIENT)
Dept: FAMILY MEDICINE CLINIC | Facility: CLINIC | Age: 54
End: 2019-05-03
Payer: COMMERCIAL

## 2019-05-03 VITALS
HEIGHT: 74 IN | TEMPERATURE: 98 F | WEIGHT: 251 LBS | SYSTOLIC BLOOD PRESSURE: 138 MMHG | DIASTOLIC BLOOD PRESSURE: 80 MMHG | RESPIRATION RATE: 14 BRPM | BODY MASS INDEX: 32.21 KG/M2 | HEART RATE: 68 BPM

## 2019-05-03 DIAGNOSIS — S80.861A TICK BITE OF RIGHT LOWER LEG, INITIAL ENCOUNTER: Primary | ICD-10-CM
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
## 2019-05-03 PROCEDURE — 99213 OFFICE O/P EST LOW 20 MIN: CPT | Performed by: FAMILY MEDICINE

## 2019-05-13 NOTE — PROGRESS NOTES
Tick bite    Patient is a 50-year-old male who had been in the [facility] several days ago. He states he found a small tick on his right lower leg. It was nonpainful. The tick did not appear to be engorged.   He did take a picture of the tick which

## 2019-05-14 ENCOUNTER — HOSPITAL ENCOUNTER (OUTPATIENT)
Dept: ULTRASOUND IMAGING | Facility: HOSPITAL | Age: 54
Discharge: HOME | End: 2019-05-14
Attending: OTOLARYNGOLOGY
Payer: COMMERCIAL

## 2019-05-14 DIAGNOSIS — E07.9 THYROID DYSFUNCTION: ICD-10-CM

## 2019-05-14 PROCEDURE — 76536 US EXAM OF HEAD AND NECK: CPT | Performed by: OTOLARYNGOLOGY

## 2019-05-15 NOTE — PROGRESS NOTES
Us thyroid showing one stable and one decreased in size recommend repeat us 1 year due for office visit with Dr Yusuf Gama

## 2019-05-23 NOTE — PROGRESS NOTES
Received notification of unviewed test results. Attempted to contact pt; left message requesting callback to discuss results and recommendations.

## 2019-05-30 PROCEDURE — 81003 URINALYSIS AUTO W/O SCOPE: CPT | Performed by: INTERNAL MEDICINE

## 2019-06-19 DIAGNOSIS — N52.8 OTHER MALE ERECTILE DYSFUNCTION: ICD-10-CM

## 2019-06-19 RX ORDER — TADALAFIL 10 MG/1
10 TABLET ORAL
Qty: 18 TABLET | Refills: 0 | Status: SHIPPED | OUTPATIENT
Start: 2019-06-19 | End: 2019-09-12

## 2019-07-08 RX ORDER — ZONISAMIDE 25 MG/1
CAPSULE ORAL
Qty: 90 CAPSULE | Refills: 1 | Status: SHIPPED | OUTPATIENT
Start: 2019-07-08 | End: 2019-10-03

## 2019-07-08 NOTE — TELEPHONE ENCOUNTER
Medication:Zonisamide 25 mg    Date of last refill: 07/12/18 with 5 addt refills  [place]     Last office visit: 4/4/2019  Due back to clinic per last office note:  RTN in 6 months  Date next office visit scheduled:    F Instructions         Return in about 6 months (around 10/4/2019).

## 2019-08-19 ENCOUNTER — OFFICE VISIT (OUTPATIENT)
Dept: FAMILY MEDICINE CLINIC | Facility: CLINIC | Age: 54
End: 2019-08-19
Payer: COMMERCIAL

## 2019-08-19 VITALS
HEIGHT: 74 IN | TEMPERATURE: 98 F | RESPIRATION RATE: 16 BRPM | HEART RATE: 60 BPM | WEIGHT: 251 LBS | SYSTOLIC BLOOD PRESSURE: 122 MMHG | DIASTOLIC BLOOD PRESSURE: 86 MMHG | BODY MASS INDEX: 32.21 KG/M2

## 2019-08-19 DIAGNOSIS — Z71.1 WORRIED WELL: Primary | ICD-10-CM

## 2019-08-19 PROCEDURE — 81003 URINALYSIS AUTO W/O SCOPE: CPT | Performed by: INTERNAL MEDICINE

## 2019-08-19 PROCEDURE — 86480 TB TEST CELL IMMUN MEASURE: CPT | Performed by: INTERNAL MEDICINE

## 2019-08-19 PROCEDURE — 99213 OFFICE O/P EST LOW 20 MIN: CPT | Performed by: FAMILY MEDICINE

## 2019-08-26 NOTE — PROGRESS NOTES
Left neck lump    Patient is a 79-year-old male who is noticed an enlargement in the gland in his left upper neck. It is not painful. He has not had a recent viral symptoms. He denies a sore throat.   /86 (BP Location: Right arm, Patient Position:

## 2019-09-12 DIAGNOSIS — N52.8 OTHER MALE ERECTILE DYSFUNCTION: ICD-10-CM

## 2019-09-12 RX ORDER — TADALAFIL 10 MG/1
10 TABLET ORAL
Qty: 18 TABLET | Refills: 0 | Status: SHIPPED | OUTPATIENT
Start: 2019-09-12 | End: 2019-10-02

## 2019-09-12 NOTE — TELEPHONE ENCOUNTER
Not protocol medication. LOV :12/18/18 physical   Last labs done :12/21/19  Next appointment :n/a  Please see pending medication. Refill if appropriate.    Last refill:    Date:6/19/19  Amount :18 tablets no refills   Medication: tadalafil 10 mg

## 2019-10-02 DIAGNOSIS — N52.8 OTHER MALE ERECTILE DYSFUNCTION: ICD-10-CM

## 2019-10-03 RX ORDER — TADALAFIL 10 MG/1
10 TABLET ORAL
Qty: 18 TABLET | Refills: 0 | Status: SHIPPED | OUTPATIENT
Start: 2019-10-03 | End: 2019-11-30

## 2019-10-03 RX ORDER — ZONISAMIDE 25 MG/1
CAPSULE ORAL
Qty: 90 CAPSULE | Refills: 1 | Status: SHIPPED | OUTPATIENT
Start: 2019-10-03 | End: 2020-04-06 | Stop reason: DRUGHIGH

## 2019-10-03 NOTE — TELEPHONE ENCOUNTER
Medication:Zonisamide 25 mg     Date of last refill: 07/08/2019 with 1 addt refill  [place]      Last office visit: 4/4/2019  Due back to clinic per last office note:  RTN in 6 months  Date next office visit scheduled:

## 2019-11-05 ENCOUNTER — PATIENT MESSAGE (OUTPATIENT)
Dept: NEUROLOGY | Facility: CLINIC | Age: 54
End: 2019-11-05

## 2019-11-06 NOTE — TELEPHONE ENCOUNTER
Patient informed ok to continue the zonegran prior to surgery and day of surgery. He expressed full understanding.

## 2019-11-06 NOTE — TELEPHONE ENCOUNTER
From: Dale Melgar  To: COMFORT Rodas  Sent: 11/5/2019 11:30 AM CST  Subject: Other    Hi,  We have our first visit on 11/13. I am in the process of scheduling arthroscopic knee surgery.  Do you have any concerns with the zonisomide or alprazalam?

## 2019-11-13 ENCOUNTER — TELEPHONE (OUTPATIENT)
Dept: NEUROLOGY | Facility: CLINIC | Age: 54
End: 2019-11-13

## 2019-11-14 ENCOUNTER — OFFICE VISIT (OUTPATIENT)
Dept: NEUROLOGY | Facility: CLINIC | Age: 54
End: 2019-11-14
Payer: COMMERCIAL

## 2019-11-14 VITALS
HEIGHT: 74 IN | RESPIRATION RATE: 16 BRPM | WEIGHT: 253 LBS | DIASTOLIC BLOOD PRESSURE: 78 MMHG | HEART RATE: 82 BPM | SYSTOLIC BLOOD PRESSURE: 124 MMHG | BODY MASS INDEX: 32.47 KG/M2

## 2019-11-14 DIAGNOSIS — R26.89 BALANCE PROBLEMS: ICD-10-CM

## 2019-11-14 DIAGNOSIS — G25.0 BENIGN ESSENTIAL TREMOR SYNDROME: Primary | ICD-10-CM

## 2019-11-14 PROCEDURE — 99214 OFFICE O/P EST MOD 30 MIN: CPT | Performed by: PHYSICIAN ASSISTANT

## 2019-11-14 RX ORDER — ALPRAZOLAM 0.25 MG/1
TABLET ORAL
Qty: 60 TABLET | Refills: 5 | Status: SHIPPED | OUTPATIENT
Start: 2019-11-14

## 2019-11-14 NOTE — PROGRESS NOTES
[facility] with [address]  7/13/1965  Primary Care Provider:  Jhonatan Mckee MD    11/14/2019  Accompanied visit: No     Right handed  53year old male patient being seen for: Disp: 90 tablet, Rfl: 0  •  Multiple Vitamin (MULTI-DAY VITAMINS) Oral Tab, Take by mouth., Disp: , Rfl:   •  Naproxen Sodium (ALEVE) 220 MG Oral Cap, Take 220 mg by mouth daily. , Disp: , Rfl:   •  Cetirizine HCl 5 MG Oral Tab, Take 5 mg by mouth daily. , D AM

## 2019-11-15 ENCOUNTER — TELEPHONE (OUTPATIENT)
Dept: FAMILY MEDICINE CLINIC | Facility: CLINIC | Age: 54
End: 2019-11-15

## 2019-11-15 DIAGNOSIS — Z01.818 PRE-OP TESTING: Primary | ICD-10-CM

## 2019-11-15 NOTE — TELEPHONE ENCOUNTER
Medical Clearance, EKG, BMP request.  Patient is scheduled to have Right knee scope pmm on 11/20/19 with Dr. Glade Bloch. I spoke with patient appointment scheduled 11/18/19 with Yanni LEA. paperwork in pre-op folder(Kiersten).

## 2019-11-16 ENCOUNTER — APPOINTMENT (OUTPATIENT)
Dept: LAB | Facility: HOSPITAL | Age: 54
End: 2019-11-16
Attending: NURSE PRACTITIONER
Payer: COMMERCIAL

## 2019-11-16 DIAGNOSIS — Z01.818 PREOPERATIVE TESTING: ICD-10-CM

## 2019-11-16 DIAGNOSIS — Z01.818 PRE-OP TESTING: ICD-10-CM

## 2019-11-16 PROCEDURE — 93005 ELECTROCARDIOGRAM TRACING: CPT

## 2019-11-16 PROCEDURE — 93010 ELECTROCARDIOGRAM REPORT: CPT | Performed by: INTERNAL MEDICINE

## 2019-11-16 PROCEDURE — 80048 BASIC METABOLIC PNL TOTAL CA: CPT

## 2019-11-16 PROCEDURE — 36415 COLL VENOUS BLD VENIPUNCTURE: CPT

## 2019-11-18 ENCOUNTER — PATIENT MESSAGE (OUTPATIENT)
Dept: FAMILY MEDICINE CLINIC | Facility: CLINIC | Age: 54
End: 2019-11-18

## 2019-11-18 ENCOUNTER — OFFICE VISIT (OUTPATIENT)
Dept: FAMILY MEDICINE CLINIC | Facility: CLINIC | Age: 54
End: 2019-11-18
Payer: COMMERCIAL

## 2019-11-18 VITALS
BODY MASS INDEX: 32.34 KG/M2 | TEMPERATURE: 98 F | SYSTOLIC BLOOD PRESSURE: 132 MMHG | HEIGHT: 74 IN | RESPIRATION RATE: 16 BRPM | HEART RATE: 64 BPM | OXYGEN SATURATION: 98 % | WEIGHT: 252 LBS | DIASTOLIC BLOOD PRESSURE: 88 MMHG

## 2019-11-18 DIAGNOSIS — R94.31 ABNORMAL EKG: ICD-10-CM

## 2019-11-18 DIAGNOSIS — M22.41 PATELLA, CHONDROMALACIA, RIGHT: ICD-10-CM

## 2019-11-18 DIAGNOSIS — G47.00 INSOMNIA, UNSPECIFIED TYPE: ICD-10-CM

## 2019-11-18 DIAGNOSIS — L40.50 PSORIATIC ARTHROPATHY (HCC): ICD-10-CM

## 2019-11-18 DIAGNOSIS — R73.01 ELEVATED FASTING GLUCOSE: ICD-10-CM

## 2019-11-18 DIAGNOSIS — S83.231A COMPLEX TEAR OF MEDIAL MENISCUS OF RIGHT KNEE AS CURRENT INJURY, INITIAL ENCOUNTER: ICD-10-CM

## 2019-11-18 DIAGNOSIS — Z01.818 PRE-OPERATIVE CLEARANCE: Primary | ICD-10-CM

## 2019-11-18 DIAGNOSIS — G25.0 BENIGN ESSENTIAL TREMOR SYNDROME: ICD-10-CM

## 2019-11-18 DIAGNOSIS — E04.1 THYROID NODULE: ICD-10-CM

## 2019-11-18 PROCEDURE — 99244 OFF/OP CNSLTJ NEW/EST MOD 40: CPT | Performed by: NURSE PRACTITIONER

## 2019-11-18 NOTE — H&P
Elaina Arellano is a 53year old male who presents for a pre-operative physical exam. Patient is to have right knee arthroscopy, to be done by Dr. Yasemin Joy at [facility] on 12/13/2019.     Prior anesthesia- yes, no problems with anesthesia mouth every 7 days. 001 tablet 1   • FOLIC ACID 1 MG Oral Tab TAKE 1 TABLET (1 MG TOTAL) BY MOUTH ONCE DAILY. 90 tablet 0   • Multiple Vitamin (MULTI-DAY VITAMINS) Oral Tab Take by mouth.      • Naproxen Sodium (ALEVE) 220 MG Oral Cap Take 220 mg by mouth d shortness of breath with exertion  CARDIOVASCULAR: denies chest pain on exertion  GI: denies abdominal pain,denies heartburn  : denies dysuria or changes in stream  MUSCULOSKELETAL: denies back pain  NEURO: denies headaches  PSYCHE: denies depression or 11/19/19.  - CARD NUC STRESS TEST LEXISCAN (CPT=93015/60966/); Future    2. Complex tear of medial meniscus of right knee as current injury, initial encounter      3. Patella, chondromalacia, right    4. Psoriatic arthropathy (HCC)  Stable.     [address]

## 2019-11-18 NOTE — PROGRESS NOTES
Call to Helio Loving at [phone] for prior auth of [address] to [facility]. No prior auth required. Reference # [id number]. Call to schedule. Spoke to Bren.  Scheduled for 6am 11/19/19 at [facility].    Pt informed at office v

## 2019-11-19 ENCOUNTER — TELEPHONE (OUTPATIENT)
Dept: FAMILY MEDICINE CLINIC | Facility: CLINIC | Age: 54
End: 2019-11-19

## 2019-11-19 ENCOUNTER — PATIENT MESSAGE (OUTPATIENT)
Dept: FAMILY MEDICINE CLINIC | Facility: CLINIC | Age: 54
End: 2019-11-19

## 2019-11-19 PROBLEM — Z79.899 ENCOUNTER FOR LONG-TERM (CURRENT) USE OF HIGH-RISK MEDICATION: Status: ACTIVE | Noted: 2017-01-30

## 2019-11-19 NOTE — TELEPHONE ENCOUNTER
Good afternoon!     Spoke with Roldan Arias @ [phone] and accdg to her she s/w Rolando Warner and stated they have 72 hrs to process an expedited request.  Asked Rolando Warner to please call me by 3pm CST (2PM MT) to let me know if the Medical Director has made a decisio

## 2019-11-19 NOTE — TELEPHONE ENCOUNTER
Call to zaynab[facility]-updated w all info noted below and discussed if resched pt for stat nuclear stress test today will be possible if we get auth response from ins shortly this morning.    She sts may be able to get pt back in for 100 pm to

## 2019-11-19 NOTE — TELEPHONE ENCOUNTER
0800 per [facility] APRN-stat order for nuclear stress test placed yesterday, pt was scheduled to do at 0600 today. sts we were advised by [facility] yesterday that no auth required.    Info from [facility] referrals indicates auth IS required and npo for now.

## 2019-11-19 NOTE — TELEPHONE ENCOUNTER
Call from jennifer/[facility] central referral dept. sts he just spoke mariah saez/medical management w [facility]. Reports se advised him will not have a determination re nuclear stress test PA until at least tomorrow noon.    sts he will update us as kwame

## 2019-11-20 ENCOUNTER — TELEPHONE (OUTPATIENT)
Dept: FAMILY MEDICINE CLINIC | Facility: CLINIC | Age: 54
End: 2019-11-20

## 2019-11-20 NOTE — TELEPHONE ENCOUNTER
From: Effie Melgar  To: GLORIA Bautista  Sent: 11/18/2019 8:58 PM CST  Subject: Test Results Question    The Stress Test had not been authorized by the end of the day.  (I called as well)  Test cancelled and will need to be rescheduled when it is Autho

## 2019-11-20 NOTE — TELEPHONE ENCOUNTER
From: Knute Snellen Apland  To: GLORIA Simmons  Sent: 11/19/2019 2:08 PM CST  Subject: Test Results Question    It appears we aren’t going to be able to get the stress test completed.  Are you in contact with scheduling on that or do I need to contact them to

## 2019-11-20 NOTE — TELEPHONE ENCOUNTER
Good afternoon!     Called Krystal @  [phone] re: [id number] and s/w Frederic Romero and she has indicated that the request was denied and was transferred to the Medical Management Team to discuss the denial.  According to Frederic Romero one of the reasons for the d

## 2019-11-20 NOTE — TELEPHONE ENCOUNTER
Call to pt's cell reaches identified voice mail. Per [employer] consent, left vmm w info noted below, explained peer to peer process.   brian states if ins sends peer to peer contact info by Friday morning, she will attempt to do peer to peer review at that ti

## 2019-11-20 NOTE — TELEPHONE ENCOUNTER
Call to jonathan[facility]-sts prep for nuclear stress test/lexiscan is no food for 3 hrs before test, NO caffeine or decaffeinated products which includes chocolate.  If any of those ingested would need to cancel test. sts water is ok up until t

## 2019-11-25 ENCOUNTER — TELEPHONE (OUTPATIENT)
Dept: FAMILY MEDICINE CLINIC | Facility: CLINIC | Age: 54
End: 2019-11-25

## 2019-11-25 NOTE — TELEPHONE ENCOUNTER
Call back from jennifer/[facility] referrals-sts he has received nothing from [facility] so is sending them a specific request now. See his phone call to us today. brian updated w this info and agrees w plan.      Call to pt-updated that [facility] has

## 2019-11-25 NOTE — TELEPHONE ENCOUNTER
Good afternoon Rubina! Missed your call earlier. I have not received the denial letter from [facility] but I will not wait for that to start a request for a PTP immediately. We will do the followin.   Fax on our official letterhead the reques

## 2019-11-25 NOTE — TELEPHONE ENCOUNTER
brian [facility] has not received denial reasons, or peer to peer request to appeal denial of nuclear stress test.  Call to jennifer/[facility] referrals reaches his identified voice mail.  Left aileen w above info and req call back to me asap today-provided our o

## 2019-11-27 ENCOUNTER — TELEPHONE (OUTPATIENT)
Dept: FAMILY MEDICINE CLINIC | Facility: CLINIC | Age: 54
End: 2019-11-27

## 2019-11-27 NOTE — TELEPHONE ENCOUNTER
At [address] request, call to bowen/siabel team/Krystal @  [phone].   Advised calling regarding their 11/20/19 denial of pt's nuclear stress test, advised we have not received any printed info re denial, peer to peer process, contact info etc. health. Update to [place].

## 2019-11-30 DIAGNOSIS — N52.8 OTHER MALE ERECTILE DYSFUNCTION: ICD-10-CM

## 2019-12-01 RX ORDER — TADALAFIL 10 MG/1
10 TABLET ORAL
Qty: 18 TABLET | Refills: 0 | Status: SHIPPED | OUTPATIENT
Start: 2019-12-01 | End: 2020-06-12

## 2019-12-03 NOTE — TELEPHONE ENCOUNTER
Call to jonathan/[facility] mariah greene to reschedule pt's stat nuclear stress test ordered 11/18/19. Ileana Del Toro offers to hold 630 am appt for pt tomorrow in nuclear room 1. sts can advise central sched dr line of this info when scheduling.  Mary Thacker pt to be NPO

## 2019-12-03 NOTE — TELEPHONE ENCOUNTER
at [facility] request, call to [facility] [employer]/ronnie/ @ [phone]. Requested status of our 11/27/19 for expedited peer to peer review.    Danica Hill our request to do appeal/peer to peer review was approved but requests i hold while s

## 2019-12-03 NOTE — TELEPHONE ENCOUNTER
Per brian-notify pt of auth noted below received today and proceed w scheduling stat nuclear stress test as ordered 11/18/19. Call to [facility] reaches voice mail-left vmm req call back to me asap today. Provided our of dr line #.   Call t

## 2019-12-04 ENCOUNTER — HOSPITAL ENCOUNTER (OUTPATIENT)
Dept: CV DIAGNOSTICS | Facility: HOSPITAL | Age: 54
Discharge: HOME | End: 2019-12-04
Attending: NURSE PRACTITIONER
Payer: COMMERCIAL

## 2019-12-04 DIAGNOSIS — Z01.818 PRE-OPERATIVE CLEARANCE: ICD-10-CM

## 2019-12-04 DIAGNOSIS — R94.31 ABNORMAL EKG: ICD-10-CM

## 2019-12-04 PROCEDURE — 93017 CV STRESS TEST TRACING ONLY: CPT | Performed by: NURSE PRACTITIONER

## 2019-12-04 PROCEDURE — 78452 HT MUSCLE IMAGE SPECT MULT: CPT | Performed by: NURSE PRACTITIONER

## 2019-12-04 PROCEDURE — 93018 CV STRESS TEST I&R ONLY: CPT | Performed by: NURSE PRACTITIONER

## 2019-12-06 ENCOUNTER — PATIENT MESSAGE (OUTPATIENT)
Dept: FAMILY MEDICINE CLINIC | Facility: CLINIC | Age: 54
End: 2019-12-06

## 2019-12-06 NOTE — TELEPHONE ENCOUNTER
From: Allen Melgar  To: GLORIA Nunez  Sent: 12/6/2019 2:40 PM CST  Subject: Test Results Question    Will the results from the stress test or a note be sent to Dr Franklin Licona so I can schedule the surgery?

## 2019-12-10 NOTE — TELEPHONE ENCOUNTER
Call to pt-he confirms same surgical procedure now scheduled for 12/13/19 w dr Jia Werner. Advised will update brian so she can update her preop clearance info and fax to dr Jia Werner. Patient voices understanding/agrees with plan/no further questions.    [employer]

## 2020-01-29 ENCOUNTER — PROCEDURE VISIT (OUTPATIENT)
Dept: NEUROLOGY | Facility: CLINIC | Age: 55
End: 2020-01-29
Payer: COMMERCIAL

## 2020-01-29 DIAGNOSIS — R20.0 NUMBNESS OF FEET: Primary | ICD-10-CM

## 2020-01-29 PROCEDURE — 95909 NRV CNDJ TST 5-6 STUDIES: CPT | Performed by: OTHER

## 2020-01-29 PROCEDURE — 95886 MUSC TEST DONE W/N TEST COMP: CPT | Performed by: OTHER

## 2020-02-20 ENCOUNTER — OFFICE VISIT (OUTPATIENT)
Dept: FAMILY MEDICINE CLINIC | Facility: CLINIC | Age: 55
End: 2020-02-20
Payer: COMMERCIAL

## 2020-02-20 VITALS
TEMPERATURE: 97 F | DIASTOLIC BLOOD PRESSURE: 110 MMHG | HEART RATE: 72 BPM | HEIGHT: 74 IN | RESPIRATION RATE: 16 BRPM | BODY MASS INDEX: 32.47 KG/M2 | WEIGHT: 253 LBS | SYSTOLIC BLOOD PRESSURE: 142 MMHG

## 2020-02-20 DIAGNOSIS — Z12.5 SCREENING FOR PROSTATE CANCER: ICD-10-CM

## 2020-02-20 DIAGNOSIS — F32.A MODERATE DEPRESSIVE EPISODE: Primary | ICD-10-CM

## 2020-02-20 DIAGNOSIS — Z13.220 SCREENING FOR LIPOID DISORDERS: ICD-10-CM

## 2020-02-20 DIAGNOSIS — E04.1 NONTOXIC UNINODULAR GOITER: ICD-10-CM

## 2020-02-20 LAB
FREE T4: 1.02 NG/DL (ref 0.93–1.7)
TSH: 2.71 MIU/L (ref 0.27–4.2)

## 2020-02-20 PROCEDURE — 99214 OFFICE O/P EST MOD 30 MIN: CPT | Performed by: FAMILY MEDICINE

## 2020-02-20 RX ORDER — ESCITALOPRAM OXALATE 10 MG/1
10 TABLET ORAL DAILY
Qty: 30 TABLET | Refills: 3 | Status: SHIPPED | OUTPATIENT
Start: 2020-02-20 | End: 2020-04-06

## 2020-02-20 NOTE — PROGRESS NOTES
Heather Monteiro is a 53year old male. HPI:   Mr. Karen Montes is a 11-year-old male who was originally scheduled for a complete physical.  However on his intake interview he admitted to significant depression.   He states he has lost his job and has had difficulty History:  Social History    Tobacco Use      Smoking status: Never Smoker      Smokeless tobacco: Never Used    Alcohol use:  Yes      Alcohol/week: 0.0 standard drinks      Frequency: Monthly or less      Binge frequency: Weekly      Comment: social 2-3 BE

## 2020-03-06 ENCOUNTER — PATIENT MESSAGE (OUTPATIENT)
Dept: FAMILY MEDICINE CLINIC | Facility: CLINIC | Age: 55
End: 2020-03-06

## 2020-03-06 NOTE — TELEPHONE ENCOUNTER
From: Last Melgar  To: Brad Trammell MD  Sent: 3/6/2020 10:36 AM CST  Subject: Other    Dr Cedillo Re,  I have been doing a little better, mood has lifted but not yet as focused as I need to be/would like to be.  Sleep has been better, but seems to be

## 2020-03-17 ENCOUNTER — PATIENT MESSAGE (OUTPATIENT)
Dept: FAMILY MEDICINE CLINIC | Facility: CLINIC | Age: 55
End: 2020-03-17

## 2020-03-17 DIAGNOSIS — N52.8 OTHER MALE ERECTILE DYSFUNCTION: Primary | ICD-10-CM

## 2020-03-17 RX ORDER — TADALAFIL 20 MG/1
20 TABLET ORAL
Qty: 18 TABLET | Refills: 0 | Status: SHIPPED | OUTPATIENT
Start: 2020-03-17 | End: 2020-06-12

## 2020-03-17 NOTE — TELEPHONE ENCOUNTER
From: Enoch Melgar  To: Almita Dhillon MD  Sent: 3/17/2020 11:09 AM CDT  Subject: Prescription Question    Can you please change my tadalafil prescription to 20 mg (from 10 mg) and send it to the pharmacy.

## 2020-03-18 ENCOUNTER — TELEPHONE (OUTPATIENT)
Dept: FAMILY MEDICINE CLINIC | Facility: CLINIC | Age: 55
End: 2020-03-18

## 2020-03-24 ENCOUNTER — TELEPHONE (OUTPATIENT)
Dept: FAMILY MEDICINE CLINIC | Facility: CLINIC | Age: 55
End: 2020-03-24

## 2020-03-24 NOTE — TELEPHONE ENCOUNTER
Patient would like a tele-visit with provider regarding: med follow up     Patient has given consent for this visit and SCHEDULED FOR: 4/7/20 @ 9am    Patient preferred phone #: [phone]

## 2020-03-30 ENCOUNTER — TELEPHONE (OUTPATIENT)
Dept: NEUROLOGY | Facility: CLINIC | Age: 55
End: 2020-03-30

## 2020-04-06 ENCOUNTER — VIRTUAL PHONE E/M (OUTPATIENT)
Dept: NEUROLOGY | Facility: CLINIC | Age: 55
End: 2020-04-06
Payer: COMMERCIAL

## 2020-04-06 DIAGNOSIS — M54.50 CHRONIC BILATERAL LOW BACK PAIN WITHOUT SCIATICA: Primary | ICD-10-CM

## 2020-04-06 DIAGNOSIS — G89.29 CHRONIC BILATERAL LOW BACK PAIN WITHOUT SCIATICA: Primary | ICD-10-CM

## 2020-04-06 DIAGNOSIS — F32.A MODERATE DEPRESSIVE EPISODE: ICD-10-CM

## 2020-04-06 DIAGNOSIS — G25.0 BENIGN ESSENTIAL TREMOR SYNDROME: ICD-10-CM

## 2020-04-06 PROCEDURE — 99213 OFFICE O/P EST LOW 20 MIN: CPT | Performed by: PHYSICIAN ASSISTANT

## 2020-04-06 RX ORDER — ESCITALOPRAM OXALATE 20 MG/1
20 TABLET ORAL DAILY
COMMUNITY
End: 2020-04-22

## 2020-04-06 RX ORDER — ZONISAMIDE 50 MG/1
CAPSULE ORAL
Qty: 90 CAPSULE | Refills: 2 | Status: SHIPPED | OUTPATIENT
Start: 2020-04-06 | End: 2020-12-14

## 2020-04-06 NOTE — PROGRESS NOTES
Virtual/Telephone Check-In    Jessica Almazamber Thanhgold verbally consents to a Virtual/Telephone Check-In service on 04/06/20. Patient understands and accepts financial responsibility for any deductible, co-insurance and/or co-pays associated with this service.     Du

## 2020-04-07 ENCOUNTER — VIRTUAL PHONE E/M (OUTPATIENT)
Dept: FAMILY MEDICINE CLINIC | Facility: CLINIC | Age: 55
End: 2020-04-07

## 2020-04-07 DIAGNOSIS — F32.A MODERATE DEPRESSIVE EPISODE: Primary | ICD-10-CM

## 2020-04-07 DIAGNOSIS — N52.8 OTHER MALE ERECTILE DYSFUNCTION: ICD-10-CM

## 2020-04-07 DIAGNOSIS — G25.0 BENIGN ESSENTIAL TREMOR SYNDROME: ICD-10-CM

## 2020-04-07 DIAGNOSIS — L40.50 PSORIATIC ARTHROPATHY (HCC): ICD-10-CM

## 2020-04-07 PROCEDURE — 99213 OFFICE O/P EST LOW 20 MIN: CPT | Performed by: FAMILY MEDICINE

## 2020-04-07 NOTE — PROGRESS NOTES
Virtual/Telephone Check-In    Izabella Melgar verbally consents to a Virtual/Telephone Check-In service on 04/07/20. Patient understands and accepts financial responsibility for any deductible, co-insurance and/or co-pays associated with this service.     Du

## 2020-04-21 ENCOUNTER — PATIENT MESSAGE (OUTPATIENT)
Dept: FAMILY MEDICINE CLINIC | Facility: CLINIC | Age: 55
End: 2020-04-21

## 2020-04-21 DIAGNOSIS — F32.A MODERATE DEPRESSIVE EPISODE: Primary | ICD-10-CM

## 2020-04-22 RX ORDER — ESCITALOPRAM OXALATE 20 MG/1
20 TABLET ORAL DAILY
Qty: 90 TABLET | Refills: 1 | Status: SHIPPED | OUTPATIENT
Start: 2020-04-22 | End: 2020-10-09

## 2020-04-22 NOTE — TELEPHONE ENCOUNTER
From: Adenike Melgar  To: Yo Zamarripa MD  Sent: 4/21/2020 9:54 PM CDT  Subject: Prescription Question    Dr Amaury Nayak,  Please refill the Lexapro as 20mg (didn't see the option in refills).    Thank you,  Rosibel Torres

## 2020-04-29 ENCOUNTER — LAB ENCOUNTER (OUTPATIENT)
Dept: LAB | Age: 55
End: 2020-04-29
Attending: FAMILY MEDICINE
Payer: COMMERCIAL

## 2020-04-29 ENCOUNTER — TELEPHONE (OUTPATIENT)
Dept: NEUROLOGY | Facility: CLINIC | Age: 55
End: 2020-04-29

## 2020-04-29 ENCOUNTER — HOSPITAL ENCOUNTER (OUTPATIENT)
Dept: MRI IMAGING | Facility: HOSPITAL | Age: 55
Discharge: HOME | End: 2020-04-29
Attending: PHYSICIAN ASSISTANT
Payer: COMMERCIAL

## 2020-04-29 DIAGNOSIS — G89.29 CHRONIC BILATERAL LOW BACK PAIN WITHOUT SCIATICA: ICD-10-CM

## 2020-04-29 DIAGNOSIS — E07.9 THYROID DYSFUNCTION: Primary | ICD-10-CM

## 2020-04-29 DIAGNOSIS — M54.50 CHRONIC BILATERAL LOW BACK PAIN WITHOUT SCIATICA: ICD-10-CM

## 2020-04-29 PROCEDURE — 84153 ASSAY OF PSA TOTAL: CPT | Performed by: FAMILY MEDICINE

## 2020-04-29 PROCEDURE — 72148 MRI LUMBAR SPINE W/O DYE: CPT | Performed by: PHYSICIAN ASSISTANT

## 2020-04-29 PROCEDURE — 84439 ASSAY OF FREE THYROXINE: CPT

## 2020-04-29 PROCEDURE — 36415 COLL VENOUS BLD VENIPUNCTURE: CPT | Performed by: FAMILY MEDICINE

## 2020-04-29 PROCEDURE — 84443 ASSAY THYROID STIM HORMONE: CPT

## 2020-04-29 PROCEDURE — 80061 LIPID PANEL: CPT | Performed by: FAMILY MEDICINE

## 2020-04-30 NOTE — TELEPHONE ENCOUNTER
Spoke to patient and informed him of MRI lumbar spine results. I have recommended he follow-up with his pcp regarding the epidural venous plexus engorgement for further work-up and recommendations. He expressed full understanding.

## 2020-06-01 RX ORDER — ZONISAMIDE 25 MG/1
CAPSULE ORAL
Qty: 90 CAPSULE | Refills: 1 | OUTPATIENT
Start: 2020-06-01

## 2020-06-02 ENCOUNTER — OFFICE VISIT (OUTPATIENT)
Dept: FAMILY MEDICINE CLINIC | Facility: CLINIC | Age: 55
End: 2020-06-02
Payer: COMMERCIAL

## 2020-06-02 VITALS
OXYGEN SATURATION: 96 % | TEMPERATURE: 98 F | WEIGHT: 254 LBS | HEART RATE: 86 BPM | SYSTOLIC BLOOD PRESSURE: 128 MMHG | HEIGHT: 74 IN | DIASTOLIC BLOOD PRESSURE: 82 MMHG | BODY MASS INDEX: 32.6 KG/M2 | RESPIRATION RATE: 18 BRPM

## 2020-06-02 DIAGNOSIS — N48.9 PENILE LESION: Primary | ICD-10-CM

## 2020-06-02 PROCEDURE — 99213 OFFICE O/P EST LOW 20 MIN: CPT | Performed by: FAMILY MEDICINE

## 2020-06-02 RX ORDER — VALACYCLOVIR HYDROCHLORIDE 1 G/1
TABLET, FILM COATED ORAL
Qty: 20 TABLET | Refills: 0 | Status: SHIPPED | OUTPATIENT
Start: 2020-06-02 | End: 2020-10-19 | Stop reason: ALTCHOICE

## 2020-06-02 NOTE — PROGRESS NOTES
Danielle Ferrell is a 53year old male. CHIEF COMPLAINT   Check sores on penia  HPI:   About 1 week ago noted some \"chafing\" on the tip of the penis. Hx of oral HSV - no history of genital HSV. Since then, has noticed several open sores.  Monogamous [place] REVIEW OF SYSTEMS:   GENERAL HEALTH: feels well otherwise  SKIN: as above    EXAM:   /82 (BP Location: Right arm, Patient Position: Sitting, Cuff Size: adult)   Pulse 86   Temp 98.4 °F (36.9 °C) (Oral)   Resp 18   Ht 74\"   Wt 254 lb (115.2 kg)

## 2020-06-04 ENCOUNTER — PATIENT MESSAGE (OUTPATIENT)
Dept: FAMILY MEDICINE CLINIC | Facility: CLINIC | Age: 55
End: 2020-06-04

## 2020-06-04 RX ORDER — NYSTATIN 100000 U/G
OINTMENT TOPICAL
Qty: 15 G | Refills: 0 | Status: SHIPPED | OUTPATIENT
Start: 2020-06-04 | End: 2020-10-19 | Stop reason: ALTCHOICE

## 2020-06-04 NOTE — TELEPHONE ENCOUNTER
From: Roddy Melgar  To: Ezra Anthony PA-C  Sent: 6/4/2020 3:28 PM CDT  Subject: Other    I have a question about HSV CULTURE AND TYPING resulted on 6/4/20, 3:05 PM.  Yes, please suggest or submit a prescription for the yeast infection.   Thank you,

## 2020-06-12 DIAGNOSIS — N52.8 OTHER MALE ERECTILE DYSFUNCTION: ICD-10-CM

## 2020-06-12 RX ORDER — TADALAFIL 20 MG/1
20 TABLET ORAL
Qty: 18 TABLET | Refills: 0 | Status: SHIPPED | OUTPATIENT
Start: 2020-06-12 | End: 2020-09-21

## 2020-07-13 ENCOUNTER — PATIENT MESSAGE (OUTPATIENT)
Dept: FAMILY MEDICINE CLINIC | Facility: CLINIC | Age: 55
End: 2020-07-13

## 2020-07-14 NOTE — TELEPHONE ENCOUNTER
From: Laura Melgar  To: Cherelle Land MD  Sent: 7/13/2020 3:59 PM CDT  Subject: Other    Dr Bev James,  Please call in a COVID test order to the [address] [place] [address] location for a fast test and an antibody test (and any other appropriate tests) for both S

## 2020-07-14 NOTE — TELEPHONE ENCOUNTER
I called and spoke to pt. His wife has not received her COVID-19 testing results yet from [facility]. She was tested on July 6th and still has not received the results. They have called to inquire about the results and no one can locate them.  Pt was wanting order

## 2020-09-19 DIAGNOSIS — N52.8 OTHER MALE ERECTILE DYSFUNCTION: ICD-10-CM

## 2020-09-21 DIAGNOSIS — N52.8 OTHER MALE ERECTILE DYSFUNCTION: ICD-10-CM

## 2020-09-22 RX ORDER — TADALAFIL 20 MG/1
20 TABLET ORAL
Qty: 18 TABLET | Refills: 0 | Status: SHIPPED | OUTPATIENT
Start: 2020-09-22 | End: 2020-12-17

## 2020-09-22 RX ORDER — TADALAFIL 20 MG/1
20 TABLET ORAL
Qty: 18 TABLET | Refills: 0 | Status: SHIPPED | OUTPATIENT
Start: 2020-09-22 | End: 2020-10-19

## 2020-10-09 DIAGNOSIS — F32.A MODERATE DEPRESSIVE EPISODE: ICD-10-CM

## 2020-10-09 RX ORDER — ESCITALOPRAM OXALATE 20 MG/1
TABLET ORAL
Qty: 90 TABLET | Refills: 1 | Status: SHIPPED | OUTPATIENT
Start: 2020-10-09 | End: 2021-03-29

## 2020-10-09 NOTE — TELEPHONE ENCOUNTER
Pt last OV was 4/7/2020, he has an appointment for 10/13/2020. Escitalopram is not a protocol medication.   Last refill 4/22/2020 #90 + 1

## 2020-10-19 ENCOUNTER — OFFICE VISIT (OUTPATIENT)
Dept: FAMILY MEDICINE CLINIC | Facility: CLINIC | Age: 55
End: 2020-10-19
Payer: COMMERCIAL

## 2020-10-19 VITALS
WEIGHT: 255 LBS | DIASTOLIC BLOOD PRESSURE: 84 MMHG | SYSTOLIC BLOOD PRESSURE: 124 MMHG | RESPIRATION RATE: 16 BRPM | BODY MASS INDEX: 32.73 KG/M2 | TEMPERATURE: 97 F | HEIGHT: 74 IN | HEART RATE: 88 BPM

## 2020-10-19 DIAGNOSIS — E04.1 NONTOXIC UNINODULAR GOITER: ICD-10-CM

## 2020-10-19 DIAGNOSIS — F32.A MODERATE DEPRESSIVE EPISODE: Primary | ICD-10-CM

## 2020-10-19 DIAGNOSIS — Z12.5 SCREENING FOR PROSTATE CANCER: ICD-10-CM

## 2020-10-19 DIAGNOSIS — L40.50 PSORIATIC ARTHROPATHY (HCC): ICD-10-CM

## 2020-10-19 DIAGNOSIS — G25.0 BENIGN ESSENTIAL TREMOR SYNDROME: ICD-10-CM

## 2020-10-19 DIAGNOSIS — Z13.220 SCREENING FOR LIPOID DISORDERS: ICD-10-CM

## 2020-10-19 DIAGNOSIS — L40.0 PLAQUE PSORIASIS: ICD-10-CM

## 2020-10-19 PROCEDURE — 99396 PREV VISIT EST AGE 40-64: CPT | Performed by: FAMILY MEDICINE

## 2020-10-19 PROCEDURE — 3008F BODY MASS INDEX DOCD: CPT | Performed by: FAMILY MEDICINE

## 2020-10-19 PROCEDURE — 3074F SYST BP LT 130 MM HG: CPT | Performed by: FAMILY MEDICINE

## 2020-10-19 PROCEDURE — 3079F DIAST BP 80-89 MM HG: CPT | Performed by: FAMILY MEDICINE

## 2020-10-19 NOTE — PROGRESS NOTES
Adonay Lopez is a 63year old male who presents for a complete physical exam.   HPI:   Sees Dr. Lata Middleton for his thyroid disorder. .  He has a history of a tonsillectomy and adenoidectomy for ADALBERTO.   His wife has mentioned that his apneic p 90 tablet 3   • ESCITALOPRAM 20 MG Oral Tab TAKE 1 TABLET BY MOUTH EVERY DAY 90 tablet 1   • Tadalafil 20 MG Oral Tab Take 1 tablet (20 mg total) by mouth daily as needed for Erectile Dysfunction.  18 tablet 0   • methotrexate 2.5 MG Oral Tab Take 7 tablets use: Yes      Alcohol/week: 0.0 standard drinks      Frequency: Monthly or less      Binge frequency: Weekly      Comment: social 2-3 BEERSOR MIXED DRINKS WEEKLY    Drug use: No        REVIEW OF SYSTEMS:   GENERAL: feels well otherwise  SKIN: denies any un goiter  Screening for lipoid disorders  Screening for prostate cancer    Orders Placed This Encounter      CBC W Differential W Platelet      Comp Metabolic Panel (14)      Lipid Panel      PSA      TSH and Free T4 [E]      Meds & Refills for this Visit:

## 2020-12-14 RX ORDER — ZONISAMIDE 50 MG/1
CAPSULE ORAL
Qty: 90 CAPSULE | Refills: 1 | Status: SHIPPED | OUTPATIENT
Start: 2020-12-14 | End: 2021-05-28

## 2020-12-16 ENCOUNTER — OFFICE VISIT (OUTPATIENT)
Dept: FAMILY MEDICINE CLINIC | Facility: CLINIC | Age: 55
End: 2020-12-16
Payer: COMMERCIAL

## 2020-12-16 VITALS
WEIGHT: 256 LBS | HEIGHT: 74 IN | RESPIRATION RATE: 18 BRPM | SYSTOLIC BLOOD PRESSURE: 122 MMHG | HEART RATE: 72 BPM | BODY MASS INDEX: 32.85 KG/M2 | DIASTOLIC BLOOD PRESSURE: 80 MMHG

## 2020-12-16 DIAGNOSIS — H00.012 HORDEOLUM EXTERNUM OF RIGHT LOWER EYELID: Primary | ICD-10-CM

## 2020-12-16 PROCEDURE — 3074F SYST BP LT 130 MM HG: CPT | Performed by: FAMILY MEDICINE

## 2020-12-16 PROCEDURE — 3008F BODY MASS INDEX DOCD: CPT | Performed by: FAMILY MEDICINE

## 2020-12-16 PROCEDURE — 3079F DIAST BP 80-89 MM HG: CPT | Performed by: FAMILY MEDICINE

## 2020-12-16 PROCEDURE — 99213 OFFICE O/P EST LOW 20 MIN: CPT | Performed by: FAMILY MEDICINE

## 2020-12-16 RX ORDER — POLYMYXIN B SULFATE AND TRIMETHOPRIM 1; 10000 MG/ML; [USP'U]/ML
1 SOLUTION OPHTHALMIC EVERY 4 HOURS
Qty: 1 BOTTLE | Refills: 0 | Status: SHIPPED | OUTPATIENT
Start: 2020-12-16 | End: 2020-12-23

## 2020-12-16 NOTE — PATIENT INSTRUCTIONS
Sty (or Stye)  A sty is when the oil gland of the eyelid becomes inflamed. It may develop into an infection with a small pocket of pus (an abscess). This can cause pain, redness, and swelling.  In early stages, a sty is treated with antibiotic cream, eye © 5088-6972 [employer] [address] [facility], [facility] [place]. All rights reserved. This information is not intended as a substitute for professional medical care. Always follow your healthcare professional's instructions.

## 2020-12-16 NOTE — PROGRESS NOTES
[facility] Office Note  Chief Complaint:   Patient presents with:  Eye Problem: started over the weekend right eye       HPI:   This is a 63year old male coming in for a red bump on his right lower eyelid.   It started over the we • methotrexate 2.5 MG Oral Tab Take 7 tablets (17.5 mg total) by mouth every 7 days.  131 tablet 1   • FOLIC ACID 1 MG Oral Tab TAKE 1 TABLET BY MOUTH EVERY DAY 90 tablet 3   • ESCITALOPRAM 20 MG Oral Tab TAKE 1 TABLET BY MOUTH EVERY DAY 90 tablet 1   • T from the following:    Height as of this encounter: 6' 2\" (1.88 m). Weight as of this encounter: 256 lb (116.1 kg). Vital signs reviewed. Appears stated age, well groomed.   Physical Exam:  GEN:  Patient is alert and oriented x3, no apparent distress worsening or changing symptoms. Patient is to call with any side effects or complications from the treatments as a result of today.      Problem List:  Patient Active Problem List:     Psoriatic arthritis ([facility] [address]     Pain in joint, multiple sites     Encount

## 2020-12-17 DIAGNOSIS — N52.8 OTHER MALE ERECTILE DYSFUNCTION: ICD-10-CM

## 2020-12-18 RX ORDER — TADALAFIL 20 MG/1
20 TABLET ORAL
Qty: 18 TABLET | Refills: 0 | Status: SHIPPED | OUTPATIENT
Start: 2020-12-18 | End: 2021-12-13

## 2021-02-15 ENCOUNTER — NURSE ONLY (OUTPATIENT)
Dept: FAMILY MEDICINE CLINIC | Facility: CLINIC | Age: 56
End: 2021-02-15
Payer: COMMERCIAL

## 2021-02-15 DIAGNOSIS — Z23 NEED FOR VACCINATION: Primary | ICD-10-CM

## 2021-02-15 PROCEDURE — 90750 HZV VACC RECOMBINANT IM: CPT | Performed by: FAMILY MEDICINE

## 2021-02-15 PROCEDURE — 90471 IMMUNIZATION ADMIN: CPT | Performed by: FAMILY MEDICINE

## 2021-02-24 DIAGNOSIS — R73.9 HYPERGLYCEMIA: Primary | ICD-10-CM

## 2021-02-26 LAB
ABSOLUTE BASOPHILS: 38 CELLS/UL (ref 0–200)
ABSOLUTE EOSINOPHILS: 269 CELLS/UL (ref 15–500)
ABSOLUTE LYMPHOCYTES: 2739 CELLS/UL (ref 850–3900)
ABSOLUTE MONOCYTES: 595 CELLS/UL (ref 200–950)
ABSOLUTE NEUTROPHILS: 2758 CELLS/UL (ref 1500–7800)
ALBUMIN/GLOBULIN RATIO: 1.8 (CALC) (ref 1–2.5)
ALBUMIN: 4.4 G/DL (ref 3.6–5.1)
ALKALINE PHOSPHATASE: 97 U/L (ref 35–144)
ALT: 22 U/L (ref 9–46)
AST: 13 U/L (ref 10–35)
BASOPHILS: 0.6 %
BILIRUBIN, TOTAL: 0.9 MG/DL (ref 0.2–1.2)
BUN: 14 MG/DL (ref 7–25)
CALCIUM: 9.6 MG/DL (ref 8.6–10.3)
CARBON DIOXIDE: 27 MMOL/L (ref 20–32)
CHLORIDE: 107 MMOL/L (ref 98–110)
CHOL/HDLC RATIO: 4.6 (CALC)
CHOLESTEROL, TOTAL: 172 MG/DL
CREATININE: 1.26 MG/DL (ref 0.7–1.33)
EGFR IF AFRICN AM: 73 ML/MIN/1.73M2
EGFR IF NONAFRICN AM: 63 ML/MIN/1.73M2
EOSINOPHILS: 4.2 %
GLOBULIN: 2.4 G/DL (CALC) (ref 1.9–3.7)
GLUCOSE: 111 MG/DL (ref 65–99)
HDL CHOLESTEROL: 37 MG/DL
HEMATOCRIT: 52.1 % (ref 38.5–50)
HEMOGLOBIN A1C: 5.4 % OF TOTAL HGB
HEMOGLOBIN: 17.8 G/DL (ref 13.2–17.1)
LDL-CHOLESTEROL: 107 MG/DL (CALC)
LYMPHOCYTES: 42.8 %
MCH: 29.8 PG (ref 27–33)
MCHC: 34.2 G/DL (ref 32–36)
MCV: 87.1 FL (ref 80–100)
MONOCYTES: 9.3 %
MPV: 11 FL (ref 7.5–12.5)
NEUTROPHILS: 43.1 %
NON-HDL CHOLESTEROL: 135 MG/DL (CALC)
PLATELET COUNT: 282 THOUSAND/UL (ref 140–400)
POTASSIUM: 4.2 MMOL/L (ref 3.5–5.3)
PROTEIN, TOTAL: 6.8 G/DL (ref 6.1–8.1)
PSA, TOTAL: 0.8 NG/ML
RDW: 13.3 % (ref 11–15)
RED BLOOD CELL COUNT: 5.98 MILLION/UL (ref 4.2–5.8)
SODIUM: 140 MMOL/L (ref 135–146)
TRIGLYCERIDES: 167 MG/DL
WHITE BLOOD CELL COUNT: 6.4 THOUSAND/UL (ref 3.8–10.8)

## 2021-03-27 DIAGNOSIS — F32.A MODERATE DEPRESSIVE EPISODE: ICD-10-CM

## 2021-03-29 RX ORDER — ESCITALOPRAM OXALATE 20 MG/1
TABLET ORAL
Qty: 90 TABLET | Refills: 1 | Status: SHIPPED | OUTPATIENT
Start: 2021-03-29 | End: 2022-01-25

## 2021-04-11 ENCOUNTER — IMMUNIZATION (OUTPATIENT)
Dept: LAB | Age: 56
End: 2021-04-11
Attending: HOSPITALIST
Payer: COMMERCIAL

## 2021-04-11 DIAGNOSIS — Z23 NEED FOR VACCINATION: Primary | ICD-10-CM

## 2021-04-11 PROCEDURE — 0001A SARSCOV2 VAC 30MCG/0.3ML IM: CPT

## 2021-05-02 ENCOUNTER — IMMUNIZATION (OUTPATIENT)
Dept: LAB | Age: 56
End: 2021-05-02
Attending: HOSPITALIST
Payer: COMMERCIAL

## 2021-05-02 DIAGNOSIS — Z23 NEED FOR VACCINATION: Primary | ICD-10-CM

## 2021-05-02 PROCEDURE — 0002A SARSCOV2 VAC 30MCG/0.3ML IM: CPT

## 2021-05-28 DIAGNOSIS — G25.0 BENIGN ESSENTIAL TREMOR SYNDROME: Primary | ICD-10-CM

## 2021-05-28 RX ORDER — ZONISAMIDE 50 MG/1
CAPSULE ORAL
Qty: 90 CAPSULE | Refills: 1 | Status: SHIPPED | OUTPATIENT
Start: 2021-05-28 | End: 2022-01-10

## 2021-07-02 ENCOUNTER — LAB ENCOUNTER (OUTPATIENT)
Dept: LAB | Age: 56
End: 2021-07-02
Attending: FAMILY MEDICINE
Payer: COMMERCIAL

## 2021-07-02 ENCOUNTER — NURSE ONLY (OUTPATIENT)
Dept: FAMILY MEDICINE CLINIC | Facility: CLINIC | Age: 56
End: 2021-07-02
Payer: COMMERCIAL

## 2021-07-02 DIAGNOSIS — R73.9 HYPERGLYCEMIA: Primary | ICD-10-CM

## 2021-07-02 DIAGNOSIS — R79.89 ABNORMAL CBC: ICD-10-CM

## 2021-07-02 DIAGNOSIS — R73.9 HYPERGLYCEMIA: ICD-10-CM

## 2021-07-02 LAB
BASOPHILS # BLD AUTO: 0.03 X10(3) UL (ref 0–0.2)
BASOPHILS NFR BLD AUTO: 0.5 %
DEPRECATED RDW RBC AUTO: 54.3 FL (ref 35.1–46.3)
EOSINOPHIL # BLD AUTO: 0.2 X10(3) UL (ref 0–0.7)
EOSINOPHIL NFR BLD AUTO: 3 %
ERYTHROCYTE [DISTWIDTH] IN BLOOD BY AUTOMATED COUNT: 17.1 % (ref 11–15)
EST. AVERAGE GLUCOSE BLD GHB EST-MCNC: 105 MG/DL (ref 68–126)
HBA1C MFR BLD HPLC: 5.3 % (ref ?–5.7)
HCT VFR BLD AUTO: 51.6 %
HGB BLD-MCNC: 17.4 G/DL
IMM GRANULOCYTES # BLD AUTO: 0.01 X10(3) UL (ref 0–1)
IMM GRANULOCYTES NFR BLD: 0.2 %
LYMPHOCYTES # BLD AUTO: 2.81 X10(3) UL (ref 1–4)
LYMPHOCYTES NFR BLD AUTO: 42.8 %
MCH RBC QN AUTO: 31.6 PG (ref 26–34)
MCHC RBC AUTO-ENTMCNC: 33.7 G/DL (ref 31–37)
MCV RBC AUTO: 93.6 FL
MONOCYTES # BLD AUTO: 0.69 X10(3) UL (ref 0.1–1)
MONOCYTES NFR BLD AUTO: 10.5 %
NEUTROPHILS # BLD AUTO: 2.82 X10 (3) UL (ref 1.5–7.7)
NEUTROPHILS # BLD AUTO: 2.82 X10(3) UL (ref 1.5–7.7)
NEUTROPHILS NFR BLD AUTO: 43 %
PLATELET # BLD AUTO: 217 10(3)UL (ref 150–450)
RBC # BLD AUTO: 5.51 X10(6)UL
WBC # BLD AUTO: 6.6 X10(3) UL (ref 4–11)

## 2021-07-02 PROCEDURE — 85025 COMPLETE CBC W/AUTO DIFF WBC: CPT

## 2021-07-02 PROCEDURE — 83036 HEMOGLOBIN GLYCOSYLATED A1C: CPT

## 2021-07-02 PROCEDURE — 36415 COLL VENOUS BLD VENIPUNCTURE: CPT

## 2021-07-02 PROCEDURE — 90471 IMMUNIZATION ADMIN: CPT | Performed by: FAMILY MEDICINE

## 2021-07-02 PROCEDURE — 90750 HZV VACC RECOMBINANT IM: CPT | Performed by: FAMILY MEDICINE

## 2021-07-02 NOTE — PROGRESS NOTES
Pt presents for shingrix #2 vaccine. Record shows #1 given 2/15/21  Pt reports no side effects or adverse reax w previous vaccine administrations. Reinforced poss vaccine side effects and conservative management measures, if needed.    Advised if any bianca

## 2021-07-15 ENCOUNTER — TELEPHONE (OUTPATIENT)
Dept: FAMILY MEDICINE CLINIC | Facility: CLINIC | Age: 56
End: 2021-07-15

## 2021-07-15 NOTE — TELEPHONE ENCOUNTER
As long as feeling ok currently, no need for ER but if symptoms return, recommend ER evaluation. Rather than 8/10 appointment, he should see Dr. Miguel Wilkins next week to address. Dr. Miguel Wilkins can order any necessary blood work at that time.

## 2021-07-15 NOTE — TELEPHONE ENCOUNTER
Pt states yesterday 7/14 he had not been drinking but had \"felt drunk\". Pt was dizzy, \"foggy\", and stated he \"wouldn't have felt comfortable driving\". Pt states this lasted for around 6 hours, after eating he felt normal a few hours later.

## 2021-07-22 ENCOUNTER — OFFICE VISIT (OUTPATIENT)
Dept: FAMILY MEDICINE CLINIC | Facility: CLINIC | Age: 56
End: 2021-07-22
Payer: COMMERCIAL

## 2021-07-22 ENCOUNTER — LAB ENCOUNTER (OUTPATIENT)
Dept: LAB | Age: 56
End: 2021-07-22
Attending: FAMILY MEDICINE
Payer: COMMERCIAL

## 2021-07-22 VITALS
TEMPERATURE: 99 F | RESPIRATION RATE: 16 BRPM | HEART RATE: 68 BPM | SYSTOLIC BLOOD PRESSURE: 120 MMHG | DIASTOLIC BLOOD PRESSURE: 74 MMHG | HEIGHT: 73.5 IN | WEIGHT: 229 LBS | BODY MASS INDEX: 29.7 KG/M2

## 2021-07-22 DIAGNOSIS — R42 INTERMITTENT LIGHTHEADEDNESS: ICD-10-CM

## 2021-07-22 DIAGNOSIS — R42 INTERMITTENT LIGHTHEADEDNESS: Primary | ICD-10-CM

## 2021-07-22 LAB
CHOLEST SMN-MCNC: 165 MG/DL (ref ?–200)
HDLC SERPL-MCNC: 39 MG/DL (ref 40–59)
LDLC SERPL CALC-MCNC: 100 MG/DL (ref ?–100)
NONHDLC SERPL-MCNC: 126 MG/DL (ref ?–130)
PATIENT FASTING Y/N/NP: YES
T4 FREE SERPL-MCNC: 0.7 NG/DL (ref 0.8–1.7)
TRIGL SERPL-MCNC: 148 MG/DL (ref 30–149)
TSI SER-ACNC: 2.04 MIU/ML (ref 0.36–3.74)
VLDLC SERPL CALC-MCNC: 25 MG/DL (ref 0–30)

## 2021-07-22 PROCEDURE — 84443 ASSAY THYROID STIM HORMONE: CPT

## 2021-07-22 PROCEDURE — 84480 ASSAY TRIIODOTHYRONINE (T3): CPT

## 2021-07-22 PROCEDURE — 99214 OFFICE O/P EST MOD 30 MIN: CPT | Performed by: FAMILY MEDICINE

## 2021-07-22 PROCEDURE — 3078F DIAST BP <80 MM HG: CPT | Performed by: FAMILY MEDICINE

## 2021-07-22 PROCEDURE — 36415 COLL VENOUS BLD VENIPUNCTURE: CPT

## 2021-07-22 PROCEDURE — 84439 ASSAY OF FREE THYROXINE: CPT

## 2021-07-22 PROCEDURE — 3074F SYST BP LT 130 MM HG: CPT | Performed by: FAMILY MEDICINE

## 2021-07-22 PROCEDURE — 3008F BODY MASS INDEX DOCD: CPT | Performed by: FAMILY MEDICINE

## 2021-07-22 PROCEDURE — 80061 LIPID PANEL: CPT

## 2021-07-22 NOTE — PROGRESS NOTES
HPI/Subjective: Lili Sanz is a 61year old male who presents for Dizziness (one week ago experienced dizziness and lightheadedness over a 6 hour period. Pt. states he felt as though her was drinking alcohol.)     Symptoms as above.   Patient states t )         Review of Systems:  Pertinent items are noted in HPI.       Objective:   /74   Pulse 68   Temp 98.6 °F (37 °C)   Resp 16   Ht 6' 1.5\" (1.867 m)   Wt 229 lb (103.9 kg)   BMI 29.80 kg/m²  Estimated body mass index is 29.8 kg/m² as calculated

## 2021-07-25 LAB — T3 SERPL-MCNC: 116 NG/DL (ref 60–181)

## 2021-07-26 ENCOUNTER — TELEPHONE (OUTPATIENT)
Dept: FAMILY MEDICINE CLINIC | Facility: CLINIC | Age: 56
End: 2021-07-26

## 2021-07-26 NOTE — TELEPHONE ENCOUNTER
Jayleen Ingram Do [facility] [address], [address] Office  Case is pending [address] a courtesy [facility] is offering a Peer to Peer.   Please see below. If you would like to engage in a peer to peer conversation, please contact [facility] at [phone].      Clini

## 2021-07-28 ENCOUNTER — TELEPHONE (OUTPATIENT)
Dept: FAMILY MEDICINE CLINIC | Facility: CLINIC | Age: 56
End: 2021-07-28

## 2021-07-28 DIAGNOSIS — R79.89 LOW SERUM T4 LEVEL: Primary | ICD-10-CM

## 2021-08-04 ENCOUNTER — PATIENT MESSAGE (OUTPATIENT)
Dept: FAMILY MEDICINE CLINIC | Facility: CLINIC | Age: 56
End: 2021-08-04

## 2021-08-05 ENCOUNTER — TELEPHONE (OUTPATIENT)
Dept: FAMILY MEDICINE CLINIC | Facility: CLINIC | Age: 56
End: 2021-08-05

## 2021-08-05 NOTE — TELEPHONE ENCOUNTER
CARD ECHO STRESS ECHO/REST AND STRESS DENIED BY INSURANCE    [facility] [address] [facility]  [facility],     Please see below as this case was not approved by the health plan.  The letter is outlined in its entirety.   At this time, I will be closing th worsening signs or symptoms that suggest you may have decreased oxygen to your heart muscle (cardiac ischemia), and results of your electrocardiogram or ECG (a tracing of your heart¡¦s actions) would be unclear.  ¡VResults that have never been evaluated on have the following options:  You can get copies of information used to make the decision You, your doctor, health care professional, or a person you trust to represent you, such as a family member (authorized representative) may ask to see any information w

## 2021-08-06 NOTE — TELEPHONE ENCOUNTER
Latanya Whitman LPN 5/0/6109 3:44 PM CDT      ----- Message -----  From: Enoch Melgar  Sent: 8/4/2021 3:17 PM CDT  To: [facility] Clinical Staff  Subject: Visit Follow-up Question     Dr Kristy Rivera,  I have an appointment scheduled for August 10th.  It looks

## 2021-09-13 ENCOUNTER — OFFICE VISIT (OUTPATIENT)
Dept: FAMILY MEDICINE CLINIC | Facility: CLINIC | Age: 56
End: 2021-09-13
Payer: COMMERCIAL

## 2021-09-13 VITALS
DIASTOLIC BLOOD PRESSURE: 76 MMHG | BODY MASS INDEX: 30.35 KG/M2 | WEIGHT: 234 LBS | HEART RATE: 80 BPM | TEMPERATURE: 99 F | SYSTOLIC BLOOD PRESSURE: 124 MMHG | RESPIRATION RATE: 16 BRPM | HEIGHT: 73.5 IN

## 2021-09-13 DIAGNOSIS — H10.9 CONJUNCTIVITIS OF BOTH EYES, UNSPECIFIED CONJUNCTIVITIS TYPE: Primary | ICD-10-CM

## 2021-09-13 PROCEDURE — 99213 OFFICE O/P EST LOW 20 MIN: CPT | Performed by: FAMILY MEDICINE

## 2021-09-13 PROCEDURE — 3008F BODY MASS INDEX DOCD: CPT | Performed by: FAMILY MEDICINE

## 2021-09-13 PROCEDURE — 3078F DIAST BP <80 MM HG: CPT | Performed by: FAMILY MEDICINE

## 2021-09-13 PROCEDURE — 3074F SYST BP LT 130 MM HG: CPT | Performed by: FAMILY MEDICINE

## 2021-09-13 RX ORDER — ADALIMUMAB 40MG/0.4ML
KIT SUBCUTANEOUS
COMMUNITY
Start: 2021-09-12 | End: 2022-01-10

## 2021-09-13 RX ORDER — TOBRAMYCIN 3 MG/ML
SOLUTION/ DROPS OPHTHALMIC
Qty: 5 ML | Refills: 0 | Status: SHIPPED | OUTPATIENT
Start: 2021-09-13 | End: 2021-12-14

## 2021-09-13 NOTE — PROGRESS NOTES
Tejinder Crocker is a 61year old male. CHIEF COMPLAINT   Possible pink eye  HPI:   Started using erythromycin eye ointment along with polymyxin B TMP eyedrops yesterday. Had these left over from when he had a stye previously. Bilateral eyes.   Started in [employer] Never Used    Vaping Use      Vaping Use: Never used    Alcohol use:  Yes      Alcohol/week: 0.0 standard drinks      Comment: social 2-3 BEERSOR MIXED DRINKS WEEKLY    Drug use: No       REVIEW OF SYSTEMS:   GENERAL HEALTH: feels well otherwise    EXAM:

## 2021-11-28 ENCOUNTER — IMMUNIZATION (OUTPATIENT)
Dept: FAMILY MEDICINE CLINIC | Facility: CLINIC | Age: 56
End: 2021-11-28
Payer: COMMERCIAL

## 2021-11-28 PROCEDURE — 90686 IIV4 VACC NO PRSV 0.5 ML IM: CPT | Performed by: NURSE PRACTITIONER

## 2021-11-28 PROCEDURE — 90471 IMMUNIZATION ADMIN: CPT | Performed by: NURSE PRACTITIONER

## 2021-12-13 ENCOUNTER — IMMUNIZATION (OUTPATIENT)
Dept: LAB | Facility: HOSPITAL | Age: 56
End: 2021-12-13
Attending: EMERGENCY MEDICINE
Payer: COMMERCIAL

## 2021-12-13 DIAGNOSIS — Z23 NEED FOR VACCINATION: Primary | ICD-10-CM

## 2021-12-13 DIAGNOSIS — N52.8 OTHER MALE ERECTILE DYSFUNCTION: ICD-10-CM

## 2021-12-13 PROCEDURE — 0003A SARSCOV2 VAC 30MCG/0.3ML IM: CPT

## 2021-12-13 RX ORDER — TADALAFIL 20 MG/1
20 TABLET ORAL
Qty: 18 TABLET | Refills: 0 | Status: SHIPPED | OUTPATIENT
Start: 2021-12-13 | End: 2022-01-25

## 2021-12-13 NOTE — TELEPHONE ENCOUNTER
Pt is requesting a refill be sent to     [facility], [address] [place] [address] [phone], [phone]    Tadalafil 20 MG Oral Tab 18 tablet 0 12/18/2020     Sig - Route:  Take 1 tablet (20 mg total) by mouth daily as needed for Erectile Dysfunc

## 2022-01-10 ENCOUNTER — OFFICE VISIT (OUTPATIENT)
Dept: NEUROLOGY | Facility: CLINIC | Age: 57
End: 2022-01-10
Payer: COMMERCIAL

## 2022-01-10 VITALS
HEIGHT: 73.5 IN | SYSTOLIC BLOOD PRESSURE: 132 MMHG | HEART RATE: 82 BPM | DIASTOLIC BLOOD PRESSURE: 80 MMHG | RESPIRATION RATE: 16 BRPM | WEIGHT: 244 LBS | BODY MASS INDEX: 31.65 KG/M2

## 2022-01-10 DIAGNOSIS — M54.50 CHRONIC BILATERAL LOW BACK PAIN WITHOUT SCIATICA: ICD-10-CM

## 2022-01-10 DIAGNOSIS — G25.0 BENIGN ESSENTIAL TREMOR SYNDROME: Primary | ICD-10-CM

## 2022-01-10 DIAGNOSIS — G89.29 CHRONIC BILATERAL LOW BACK PAIN WITHOUT SCIATICA: ICD-10-CM

## 2022-01-10 PROCEDURE — 99214 OFFICE O/P EST MOD 30 MIN: CPT | Performed by: OTHER

## 2022-01-10 PROCEDURE — 3079F DIAST BP 80-89 MM HG: CPT | Performed by: OTHER

## 2022-01-10 PROCEDURE — 3008F BODY MASS INDEX DOCD: CPT | Performed by: OTHER

## 2022-01-10 PROCEDURE — 3075F SYST BP GE 130 - 139MM HG: CPT | Performed by: OTHER

## 2022-01-10 RX ORDER — ZONISAMIDE 50 MG/1
50 CAPSULE ORAL
Qty: 90 CAPSULE | Refills: 3 | Status: SHIPPED | OUTPATIENT
Start: 2022-01-10

## 2022-01-10 NOTE — PROGRESS NOTES
[facility] with [address]  7/13/1965  Primary Care Provider:  Danile Fay MD    1/10/2022  Accompanied visit:     (x) No.      61year old yo patient being seen for:  [facility] Cholecalciferol (VITAMIN D) 2000 UNITS Oral Cap, Take  by mouth daily. , Disp: , Rfl:   PRN:     Allergies:  No Known Allergies         EXAM:  /80 (BP Location: Left arm, Patient Position: Sitting, Cuff Size: large)   Pulse 82   Resp 16   Ht 73.5\" patient was escorted out and handed-off discharge process and instructions to the check out desk.   No additional issues relevant to visit were raised to staff at this time interval.        This document is to be interpreted as my current opinion regarding

## 2022-01-14 RX ORDER — FOLIC ACID 1 MG/1
TABLET ORAL
Qty: 90 TABLET | Refills: 0 | Status: SHIPPED | OUTPATIENT
Start: 2022-01-14

## 2022-01-25 ENCOUNTER — OFFICE VISIT (OUTPATIENT)
Dept: FAMILY MEDICINE CLINIC | Facility: CLINIC | Age: 57
End: 2022-01-25
Payer: COMMERCIAL

## 2022-01-25 ENCOUNTER — LAB ENCOUNTER (OUTPATIENT)
Dept: LAB | Facility: HOSPITAL | Age: 57
End: 2022-01-25
Attending: FAMILY MEDICINE
Payer: COMMERCIAL

## 2022-01-25 VITALS
WEIGHT: 240 LBS | SYSTOLIC BLOOD PRESSURE: 128 MMHG | HEART RATE: 64 BPM | RESPIRATION RATE: 16 BRPM | BODY MASS INDEX: 32.15 KG/M2 | HEIGHT: 72.5 IN | DIASTOLIC BLOOD PRESSURE: 78 MMHG | TEMPERATURE: 98 F

## 2022-01-25 DIAGNOSIS — Z13.220 SCREENING FOR LIPOID DISORDERS: ICD-10-CM

## 2022-01-25 DIAGNOSIS — Z12.5 SCREENING FOR PROSTATE CANCER: ICD-10-CM

## 2022-01-25 DIAGNOSIS — Z13.21 SCREENING FOR ENDOCRINE, NUTRITIONAL, METABOLIC AND IMMUNITY DISORDER: ICD-10-CM

## 2022-01-25 DIAGNOSIS — Z00.00 ANNUAL PHYSICAL EXAM: Primary | ICD-10-CM

## 2022-01-25 DIAGNOSIS — Z13.0 SCREENING FOR ENDOCRINE, NUTRITIONAL, METABOLIC AND IMMUNITY DISORDER: ICD-10-CM

## 2022-01-25 DIAGNOSIS — Z13.89 SCREENING FOR GENITOURINARY CONDITION: ICD-10-CM

## 2022-01-25 DIAGNOSIS — Z13.0 SCREENING FOR DEFICIENCY ANEMIA: ICD-10-CM

## 2022-01-25 DIAGNOSIS — Z13.228 SCREENING FOR ENDOCRINE, NUTRITIONAL, METABOLIC AND IMMUNITY DISORDER: ICD-10-CM

## 2022-01-25 DIAGNOSIS — N52.8 OTHER MALE ERECTILE DYSFUNCTION: ICD-10-CM

## 2022-01-25 DIAGNOSIS — Z13.29 SCREENING FOR ENDOCRINE, NUTRITIONAL, METABOLIC AND IMMUNITY DISORDER: ICD-10-CM

## 2022-01-25 DIAGNOSIS — L40.50 PSORIATIC ARTHROPATHY (HCC): ICD-10-CM

## 2022-01-25 DIAGNOSIS — R73.9 HYPERGLYCEMIA: ICD-10-CM

## 2022-01-25 DIAGNOSIS — R79.89 LOW SERUM T4 LEVEL: ICD-10-CM

## 2022-01-25 LAB
ALBUMIN SERPL-MCNC: 4.1 G/DL (ref 3.4–5)
ALBUMIN/GLOB SERPL: 1.3 {RATIO} (ref 1–2)
ALP LIVER SERPL-CCNC: 84 U/L
ALT SERPL-CCNC: 55 U/L
ANION GAP SERPL CALC-SCNC: 4 MMOL/L (ref 0–18)
AST SERPL-CCNC: 19 U/L (ref 15–37)
BASOPHILS # BLD AUTO: 0.03 X10(3) UL (ref 0–0.2)
BASOPHILS NFR BLD AUTO: 0.4 %
BILIRUB SERPL-MCNC: 1.1 MG/DL (ref 0.1–2)
BILIRUB UR QL STRIP.AUTO: NEGATIVE
BUN BLD-MCNC: 16 MG/DL (ref 7–18)
CALCIUM BLD-MCNC: 9.1 MG/DL (ref 8.5–10.1)
CHLORIDE SERPL-SCNC: 106 MMOL/L (ref 98–112)
CHOLEST SERPL-MCNC: 181 MG/DL (ref ?–200)
CLARITY UR REFRACT.AUTO: CLEAR
CO2 SERPL-SCNC: 28 MMOL/L (ref 21–32)
COLOR UR AUTO: YELLOW
COMPLEXED PSA SERPL-MCNC: 0.62 NG/ML (ref ?–4)
CREAT BLD-MCNC: 1.32 MG/DL
EOSINOPHIL # BLD AUTO: 0.21 X10(3) UL (ref 0–0.7)
EOSINOPHIL NFR BLD AUTO: 2.7 %
ERYTHROCYTE [DISTWIDTH] IN BLOOD BY AUTOMATED COUNT: 16.2 %
EST. AVERAGE GLUCOSE BLD GHB EST-MCNC: 111 MG/DL (ref 68–126)
FASTING PATIENT LIPID ANSWER: YES
FASTING STATUS PATIENT QL REPORTED: YES
FOLATE SERPL-MCNC: 16 NG/ML (ref 8.7–?)
GLOBULIN PLAS-MCNC: 3.1 G/DL (ref 2.8–4.4)
GLUCOSE BLD-MCNC: 99 MG/DL (ref 70–99)
GLUCOSE UR STRIP.AUTO-MCNC: NEGATIVE MG/DL
HBA1C MFR BLD: 5.5 % (ref ?–5.7)
HCT VFR BLD AUTO: 47.2 %
HDLC SERPL-MCNC: 44 MG/DL (ref 40–59)
HGB BLD-MCNC: 16.2 G/DL
IMM GRANULOCYTES # BLD AUTO: 0.02 X10(3) UL (ref 0–1)
IMM GRANULOCYTES NFR BLD: 0.3 %
KETONES UR STRIP.AUTO-MCNC: NEGATIVE MG/DL
LDLC SERPL CALC-MCNC: 109 MG/DL (ref ?–100)
LEUKOCYTE ESTERASE UR QL STRIP.AUTO: NEGATIVE
LYMPHOCYTES # BLD AUTO: 2.75 X10(3) UL (ref 1–4)
LYMPHOCYTES NFR BLD AUTO: 35.4 %
MCH RBC QN AUTO: 30.7 PG (ref 26–34)
MCHC RBC AUTO-ENTMCNC: 34.3 G/DL (ref 31–37)
MCV RBC AUTO: 89.4 FL
MONOCYTES # BLD AUTO: 0.82 X10(3) UL (ref 0.1–1)
MONOCYTES NFR BLD AUTO: 10.6 %
NEUTROPHILS # BLD AUTO: 3.93 X10 (3) UL (ref 1.5–7.7)
NEUTROPHILS # BLD AUTO: 3.93 X10(3) UL (ref 1.5–7.7)
NEUTROPHILS NFR BLD AUTO: 50.6 %
NITRITE UR QL STRIP.AUTO: NEGATIVE
NONHDLC SERPL-MCNC: 137 MG/DL (ref ?–130)
OSMOLALITY SERPL CALC.SUM OF ELEC: 287 MOSM/KG (ref 275–295)
PH UR STRIP.AUTO: 5 [PH] (ref 5–8)
PLATELET # BLD AUTO: 256 10(3)UL (ref 150–450)
POTASSIUM SERPL-SCNC: 4.4 MMOL/L (ref 3.5–5.1)
PROT SERPL-MCNC: 7.2 G/DL (ref 6.4–8.2)
PROT UR STRIP.AUTO-MCNC: NEGATIVE MG/DL
RBC # BLD AUTO: 5.28 X10(6)UL
SODIUM SERPL-SCNC: 138 MMOL/L (ref 136–145)
SP GR UR STRIP.AUTO: 1.02 (ref 1–1.03)
T3 SERPL-MCNC: 116 NG/DL (ref 60–181)
T4 FREE SERPL-MCNC: 0.8 NG/DL (ref 0.8–1.7)
TRIGL SERPL-MCNC: 162 MG/DL (ref 30–149)
TSI SER-ACNC: 2.56 MIU/ML (ref 0.36–3.74)
UROBILINOGEN UR STRIP.AUTO-MCNC: <2 MG/DL
VLDLC SERPL CALC-MCNC: 28 MG/DL (ref 0–30)
WBC # BLD AUTO: 7.8 X10(3) UL (ref 4–11)

## 2022-01-25 PROCEDURE — 3078F DIAST BP <80 MM HG: CPT | Performed by: FAMILY MEDICINE

## 2022-01-25 PROCEDURE — 83036 HEMOGLOBIN GLYCOSYLATED A1C: CPT

## 2022-01-25 PROCEDURE — 81001 URINALYSIS AUTO W/SCOPE: CPT

## 2022-01-25 PROCEDURE — 3074F SYST BP LT 130 MM HG: CPT | Performed by: FAMILY MEDICINE

## 2022-01-25 PROCEDURE — 80061 LIPID PANEL: CPT

## 2022-01-25 PROCEDURE — 3008F BODY MASS INDEX DOCD: CPT | Performed by: FAMILY MEDICINE

## 2022-01-25 PROCEDURE — 82746 ASSAY OF FOLIC ACID SERUM: CPT

## 2022-01-25 PROCEDURE — 36415 COLL VENOUS BLD VENIPUNCTURE: CPT

## 2022-01-25 PROCEDURE — 99396 PREV VISIT EST AGE 40-64: CPT | Performed by: FAMILY MEDICINE

## 2022-01-25 PROCEDURE — 84480 ASSAY TRIIODOTHYRONINE (T3): CPT

## 2022-01-25 PROCEDURE — 85025 COMPLETE CBC W/AUTO DIFF WBC: CPT

## 2022-01-25 PROCEDURE — 84443 ASSAY THYROID STIM HORMONE: CPT

## 2022-01-25 PROCEDURE — 80053 COMPREHEN METABOLIC PANEL: CPT

## 2022-01-25 PROCEDURE — 84439 ASSAY OF FREE THYROXINE: CPT

## 2022-01-25 RX ORDER — TADALAFIL 20 MG/1
20 TABLET ORAL
Qty: 30 TABLET | Refills: 3 | Status: SHIPPED | OUTPATIENT
Start: 2022-01-25

## 2022-01-25 NOTE — PROGRESS NOTES
Bernarda Salmeron is a 61year old male who presents for a complete physical exam.   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 He had recent tonsillectomy and adenoidectomy for ADALBERTO. His wife has mentioned that his apneic periods seem less.   Hi Dispense Refill   • Tadalafil 20 MG Oral Tab Take 1 tablet (20 mg total) by mouth daily as needed for Erectile Dysfunction.  30 tablet 3   • FOLIC ACID 1 MG Oral Tab TAKE ONE TABLET BY MOUTH ONE TIME DAILY 90 tablet 0   • methotrexate 2.5 MG Oral Tab Take 7 lesions  EYES:denies blurred vision or double vision  HEENT: denies nasal congestion, sinus pain or ST  LUNGS: denies shortness of breath with exertion  CARDIOVASCULAR: denies chest pain on exertion  GI: denies abdominal pain,denies heartburn  : denies n Acid Serum [E]      Meds & Refills for this Visit:  Requested Prescriptions     Signed Prescriptions Disp Refills   • Tadalafil 20 MG Oral Tab 30 tablet 3     Sig: Take 1 tablet (20 mg total) by mouth daily as needed for Erectile Dysfunction.        Imaging

## 2022-02-03 ENCOUNTER — PATIENT MESSAGE (OUTPATIENT)
Dept: FAMILY MEDICINE CLINIC | Facility: CLINIC | Age: 57
End: 2022-02-03

## 2022-02-09 ENCOUNTER — TELEPHONE (OUTPATIENT)
Dept: FAMILY MEDICINE CLINIC | Facility: CLINIC | Age: 57
End: 2022-02-09

## 2022-02-09 NOTE — TELEPHONE ENCOUNTER
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 Call went to neha [place] for pt that he could call to let us know if he would like a copy to  or sent to him before it goes to scan. Paperwork faxed and put in [place], RN's bin to hold for pt response.

## 2022-02-10 ENCOUNTER — OFFICE VISIT (OUTPATIENT)
Dept: FAMILY MEDICINE CLINIC | Facility: CLINIC | Age: 57
End: 2022-02-10
Payer: COMMERCIAL

## 2022-02-10 VITALS
TEMPERATURE: 98 F | RESPIRATION RATE: 18 BRPM | BODY MASS INDEX: 32.56 KG/M2 | WEIGHT: 243 LBS | DIASTOLIC BLOOD PRESSURE: 82 MMHG | HEART RATE: 72 BPM | HEIGHT: 72.5 IN | SYSTOLIC BLOOD PRESSURE: 126 MMHG

## 2022-02-10 DIAGNOSIS — H10.33 ACUTE CONJUNCTIVITIS OF BOTH EYES, UNSPECIFIED ACUTE CONJUNCTIVITIS TYPE: Primary | ICD-10-CM

## 2022-02-10 PROCEDURE — 3079F DIAST BP 80-89 MM HG: CPT | Performed by: FAMILY MEDICINE

## 2022-02-10 PROCEDURE — 3008F BODY MASS INDEX DOCD: CPT | Performed by: FAMILY MEDICINE

## 2022-02-10 PROCEDURE — 3074F SYST BP LT 130 MM HG: CPT | Performed by: FAMILY MEDICINE

## 2022-02-10 PROCEDURE — 99213 OFFICE O/P EST LOW 20 MIN: CPT | Performed by: FAMILY MEDICINE

## 2022-02-10 RX ORDER — POLYMYXIN B SULFATE AND TRIMETHOPRIM 1; 10000 MG/ML; [USP'U]/ML
1 SOLUTION OPHTHALMIC EVERY 4 HOURS
COMMUNITY

## 2022-02-11 ENCOUNTER — MED REC SCAN ONLY (OUTPATIENT)
Dept: FAMILY MEDICINE CLINIC | Facility: CLINIC | Age: 57
End: 2022-02-11

## 2022-04-27 RX ORDER — FOLIC ACID 1 MG/1
TABLET ORAL
Qty: 90 TABLET | Refills: 0 | Status: SHIPPED | OUTPATIENT
Start: 2022-04-27

## 2022-05-16 ENCOUNTER — TELEPHONE (OUTPATIENT)
Dept: CASE MANAGEMENT | Age: 57
End: 2022-05-16

## 2022-05-16 ENCOUNTER — HOSPITAL ENCOUNTER (OUTPATIENT)
Age: 57
Discharge: HOME | End: 2022-05-16
Payer: COMMERCIAL

## 2022-05-16 VITALS
DIASTOLIC BLOOD PRESSURE: 75 MMHG | WEIGHT: 219.69 LBS | SYSTOLIC BLOOD PRESSURE: 133 MMHG | BODY MASS INDEX: 29.76 KG/M2 | RESPIRATION RATE: 16 BRPM | OXYGEN SATURATION: 98 % | TEMPERATURE: 99 F | HEIGHT: 72 IN | HEART RATE: 73 BPM

## 2022-05-16 DIAGNOSIS — U07.1 COVID-19 VIRUS INFECTION: Primary | ICD-10-CM

## 2022-05-16 LAB — SARS-COV-2 RNA RESP QL NAA+PROBE: DETECTED

## 2022-05-16 PROCEDURE — 99214 OFFICE O/P EST MOD 30 MIN: CPT

## 2022-05-16 PROCEDURE — 99204 OFFICE O/P NEW MOD 45 MIN: CPT

## 2022-05-16 RX ORDER — BEBTELOVIMAB 87.5 MG/ML
175 INJECTION, SOLUTION INTRAVENOUS ONCE
Status: COMPLETED | OUTPATIENT
Start: 2022-05-16 | End: 2022-05-16

## 2022-05-16 NOTE — TELEPHONE ENCOUNTER
Pt received MAB infusion at [address] [place] [address] on 5/15/22 for COVID-19. Please follow-up with pt for post-infusion assessment and home monitoring if needed. Thank you.

## 2022-05-16 NOTE — ED QUICK NOTES
Spoke with patient states he is doing well, questioned patient If he had any questions or concerns with transition from hospital to Hospice - patient states it went well.  No other needs at this time - advised patient to call with questions or concerns - patient verbalized understanding.   Warm blanket and pillow provided. Pt made comfortable. Chair reclined by pt. Lights dimmed. Call light within reach. Bottled water and cracker provided. No adverse reaction noted from Infusion.  No complaints made

## 2022-05-16 NOTE — TELEPHONE ENCOUNTER
**pt received MAB on 5/16/22 (today) NOT 5/15/22.     Will place on covid monitoring list for 5/17/22

## 2022-05-16 NOTE — ED INITIAL ASSESSMENT (HPI)
Fever-  Started Saturday  Temp 101 on and off. Took tylenol yesterday. With bodyaches and chills-  Spouse was also +covid.

## 2022-05-18 ENCOUNTER — TELEPHONE (OUTPATIENT)
Dept: FAMILY MEDICINE CLINIC | Facility: CLINIC | Age: 57
End: 2022-05-18

## 2022-05-18 NOTE — TELEPHONE ENCOUNTER
COVID + on: 5/16/22  Date of ONSET: Sat. fever. cough  CURRENTLY:  Cough:worse over 24 hours. + mucus production  SOB/Chest tightness/Chest pain: mild SOB with stair climbing. SpO2: 98%  Headache: NO  Fever: gone now  Fatigue: mild  Nausea/Vomiting/Diarrhea: NO  Nasal:+ PND  Taste/Smell:ok  Throat: \"froggy\"  Appetite reasonable/ hydrating well    Supportive MEDICATIONS:Tylenol, Daquil, Nyquil. The next condition follow up call will be on:   Friday    Patient is aware of the current CDC guidelines related to quarantine. Patient was advised to seek immediate medical attention if symptoms worsen; Especially if SOB, tightness of chest, and/or chest pain go to the emergency department. Patient verbalized understanding, no further questions or concerns at this time.

## 2022-08-15 NOTE — TELEPHONE ENCOUNTER
Good morning!    11/18/2020  S/w  Mart Vu @ [phone] ([phone] [facility])  Ref# [id number] [employer] and s/w [place] at [address] [facility] and provided fax# [phone]    Called patient @ [phone] and left vxml msg to cancel the sched
The procedure was performed independently

## 2022-08-31 ENCOUNTER — PATIENT MESSAGE (OUTPATIENT)
Dept: FAMILY MEDICINE CLINIC | Facility: CLINIC | Age: 57
End: 2022-08-31

## 2022-09-01 NOTE — TELEPHONE ENCOUNTER
From: Aneta Melgar  To: Marsha Lowe MD  Sent: 8/31/2022 4:10 PM CDT  Subject: Booster question     I have heard a whooping cough booster may be in order, especially since I will be around a new grand baby. The new COVID booster is hitting and I would like to get on that radar as well. Which priority would you advise, assuming both at once is not advisable.     Thank you,  Aretha Douglas

## 2022-10-12 ENCOUNTER — OFFICE VISIT (OUTPATIENT)
Dept: FAMILY MEDICINE CLINIC | Facility: CLINIC | Age: 57
End: 2022-10-12
Payer: COMMERCIAL

## 2022-10-12 DIAGNOSIS — Z23 NEED FOR INFLUENZA VACCINATION: Primary | ICD-10-CM

## 2022-10-12 DIAGNOSIS — Z23 NEED FOR TDAP VACCINATION: ICD-10-CM

## 2022-10-12 PROCEDURE — 90686 IIV4 VACC NO PRSV 0.5 ML IM: CPT | Performed by: NURSE PRACTITIONER

## 2022-10-12 PROCEDURE — 90715 TDAP VACCINE 7 YRS/> IM: CPT | Performed by: NURSE PRACTITIONER

## 2022-10-12 PROCEDURE — 90471 IMMUNIZATION ADMIN: CPT | Performed by: NURSE PRACTITIONER

## 2022-10-12 PROCEDURE — 90472 IMMUNIZATION ADMIN EACH ADD: CPT | Performed by: NURSE PRACTITIONER

## 2022-10-12 NOTE — PROGRESS NOTES
Vaccination Screening Questionnaire filled out by patient and reviewed by myself. No contraindications to vaccination. Vaccination information sheet given to patient/parent. Side effects and risks of vaccination explained and discussed. Patient voiced understanding and agreed to proceed with vaccination. Pt tolerated vaccine well.      Tolerated Seasonal Flu and Tdap vaccines

## 2023-01-31 ENCOUNTER — MED REC SCAN ONLY (OUTPATIENT)
Dept: FAMILY MEDICINE CLINIC | Facility: CLINIC | Age: 58
End: 2023-01-31

## 2023-01-31 ENCOUNTER — OFFICE VISIT (OUTPATIENT)
Dept: FAMILY MEDICINE CLINIC | Facility: CLINIC | Age: 58
End: 2023-01-31
Payer: COMMERCIAL

## 2023-01-31 ENCOUNTER — LAB ENCOUNTER (OUTPATIENT)
Dept: LAB | Age: 58
End: 2023-01-31
Attending: FAMILY MEDICINE
Payer: COMMERCIAL

## 2023-01-31 VITALS
TEMPERATURE: 98 F | DIASTOLIC BLOOD PRESSURE: 82 MMHG | WEIGHT: 221 LBS | RESPIRATION RATE: 12 BRPM | BODY MASS INDEX: 31.28 KG/M2 | HEART RATE: 74 BPM | HEIGHT: 70.5 IN | SYSTOLIC BLOOD PRESSURE: 120 MMHG

## 2023-01-31 DIAGNOSIS — Z13.228 SCREENING FOR ENDOCRINE, NUTRITIONAL, METABOLIC AND IMMUNITY DISORDER: ICD-10-CM

## 2023-01-31 DIAGNOSIS — Z13.0 SCREENING FOR ENDOCRINE, NUTRITIONAL, METABOLIC AND IMMUNITY DISORDER: ICD-10-CM

## 2023-01-31 DIAGNOSIS — Z12.5 SCREENING FOR PROSTATE CANCER: ICD-10-CM

## 2023-01-31 DIAGNOSIS — Z13.0 SCREENING FOR DEFICIENCY ANEMIA: ICD-10-CM

## 2023-01-31 DIAGNOSIS — L40.50 PSORIATIC ARTHROPATHY (HCC): ICD-10-CM

## 2023-01-31 DIAGNOSIS — Z13.29 SCREENING FOR ENDOCRINE, NUTRITIONAL, METABOLIC AND IMMUNITY DISORDER: ICD-10-CM

## 2023-01-31 DIAGNOSIS — Z13.21 SCREENING FOR ENDOCRINE, NUTRITIONAL, METABOLIC AND IMMUNITY DISORDER: ICD-10-CM

## 2023-01-31 DIAGNOSIS — Z13.220 SCREENING FOR LIPOID DISORDERS: ICD-10-CM

## 2023-01-31 DIAGNOSIS — Z00.00 ANNUAL PHYSICAL EXAM: Primary | ICD-10-CM

## 2023-01-31 DIAGNOSIS — N52.8 OTHER MALE ERECTILE DYSFUNCTION: ICD-10-CM

## 2023-01-31 LAB
ALBUMIN SERPL-MCNC: 3.8 G/DL (ref 3.4–5)
ALBUMIN/GLOB SERPL: 1.4 {RATIO} (ref 1–2)
ALP LIVER SERPL-CCNC: 87 U/L
ALT SERPL-CCNC: 45 U/L
ANION GAP SERPL CALC-SCNC: 3 MMOL/L (ref 0–18)
AST SERPL-CCNC: 17 U/L (ref 15–37)
BASOPHILS # BLD AUTO: 0.03 X10(3) UL (ref 0–0.2)
BASOPHILS NFR BLD AUTO: 0.5 %
BILIRUB SERPL-MCNC: 0.5 MG/DL (ref 0.1–2)
BUN BLD-MCNC: 19 MG/DL (ref 7–18)
BUN/CREAT SERPL: 11.9 (ref 10–20)
CALCIUM BLD-MCNC: 8.9 MG/DL (ref 8.5–10.1)
CHLORIDE SERPL-SCNC: 108 MMOL/L (ref 98–112)
CHOLEST SERPL-MCNC: 119 MG/DL (ref ?–200)
CO2 SERPL-SCNC: 28 MMOL/L (ref 21–32)
CREAT BLD-MCNC: 1.59 MG/DL
DEPRECATED RDW RBC AUTO: 51.5 FL (ref 35.1–46.3)
EOSINOPHIL # BLD AUTO: 0.12 X10(3) UL (ref 0–0.7)
EOSINOPHIL NFR BLD AUTO: 1.9 %
ERYTHROCYTE [DISTWIDTH] IN BLOOD BY AUTOMATED COUNT: 16.3 % (ref 11–15)
EST. AVERAGE GLUCOSE BLD GHB EST-MCNC: 100 MG/DL (ref 68–126)
FASTING PATIENT LIPID ANSWER: NO
FASTING STATUS PATIENT QL REPORTED: NO
GFR SERPLBLD BASED ON 1.73 SQ M-ARVRAT: 50 ML/MIN/1.73M2 (ref 60–?)
GLOBULIN PLAS-MCNC: 2.8 G/DL (ref 2.8–4.4)
GLUCOSE BLD-MCNC: 92 MG/DL (ref 70–99)
HBA1C MFR BLD: 5.1 % (ref ?–5.7)
HCT VFR BLD AUTO: 46.1 %
HDLC SERPL-MCNC: 43 MG/DL (ref 40–59)
HGB BLD-MCNC: 14.6 G/DL
IMM GRANULOCYTES # BLD AUTO: 0.02 X10(3) UL (ref 0–1)
IMM GRANULOCYTES NFR BLD: 0.3 %
LDLC SERPL CALC-MCNC: 63 MG/DL (ref ?–100)
LYMPHOCYTES # BLD AUTO: 2.15 X10(3) UL (ref 1–4)
LYMPHOCYTES NFR BLD AUTO: 33.2 %
MCH RBC QN AUTO: 27.7 PG (ref 26–34)
MCHC RBC AUTO-ENTMCNC: 31.7 G/DL (ref 31–37)
MCV RBC AUTO: 87.3 FL
MONOCYTES # BLD AUTO: 0.69 X10(3) UL (ref 0.1–1)
MONOCYTES NFR BLD AUTO: 10.6 %
NEUTROPHILS # BLD AUTO: 3.47 X10 (3) UL (ref 1.5–7.7)
NEUTROPHILS # BLD AUTO: 3.47 X10(3) UL (ref 1.5–7.7)
NEUTROPHILS NFR BLD AUTO: 53.5 %
NONHDLC SERPL-MCNC: 76 MG/DL (ref ?–130)
OSMOLALITY SERPL CALC.SUM OF ELEC: 290 MOSM/KG (ref 275–295)
PLATELET # BLD AUTO: 258 10(3)UL (ref 150–450)
POTASSIUM SERPL-SCNC: 4.4 MMOL/L (ref 3.5–5.1)
PROT SERPL-MCNC: 6.6 G/DL (ref 6.4–8.2)
PSA SERPL-MCNC: 0.87 NG/ML (ref ?–4)
RBC # BLD AUTO: 5.28 X10(6)UL
SODIUM SERPL-SCNC: 139 MMOL/L (ref 136–145)
TRIGL SERPL-MCNC: 57 MG/DL (ref 30–149)
VLDLC SERPL CALC-MCNC: 8 MG/DL (ref 0–30)
WBC # BLD AUTO: 6.5 X10(3) UL (ref 4–11)

## 2023-01-31 PROCEDURE — 80061 LIPID PANEL: CPT

## 2023-01-31 PROCEDURE — 83036 HEMOGLOBIN GLYCOSYLATED A1C: CPT

## 2023-01-31 PROCEDURE — 80053 COMPREHEN METABOLIC PANEL: CPT

## 2023-01-31 PROCEDURE — 85025 COMPLETE CBC W/AUTO DIFF WBC: CPT

## 2023-01-31 PROCEDURE — 84153 ASSAY OF PSA TOTAL: CPT

## 2023-01-31 PROCEDURE — 3074F SYST BP LT 130 MM HG: CPT | Performed by: FAMILY MEDICINE

## 2023-01-31 PROCEDURE — 99396 PREV VISIT EST AGE 40-64: CPT | Performed by: FAMILY MEDICINE

## 2023-01-31 PROCEDURE — 3008F BODY MASS INDEX DOCD: CPT | Performed by: FAMILY MEDICINE

## 2023-01-31 PROCEDURE — 3079F DIAST BP 80-89 MM HG: CPT | Performed by: FAMILY MEDICINE

## 2023-01-31 RX ORDER — TADALAFIL 20 MG/1
20 TABLET ORAL
Qty: 30 TABLET | Refills: 3 | Status: SHIPPED | OUTPATIENT
Start: 2023-01-31

## 2023-02-01 DIAGNOSIS — R79.89 ELEVATED SERUM CREATININE: Primary | ICD-10-CM

## 2023-02-08 ENCOUNTER — MED REC SCAN ONLY (OUTPATIENT)
Dept: FAMILY MEDICINE CLINIC | Facility: CLINIC | Age: 58
End: 2023-02-08

## 2023-02-11 ENCOUNTER — LAB ENCOUNTER (OUTPATIENT)
Dept: LAB | Facility: HOSPITAL | Age: 58
End: 2023-02-11
Attending: FAMILY MEDICINE
Payer: COMMERCIAL

## 2023-02-11 DIAGNOSIS — R79.89 ELEVATED SERUM CREATININE: ICD-10-CM

## 2023-02-11 LAB
ANION GAP SERPL CALC-SCNC: 6 MMOL/L (ref 0–18)
BUN BLD-MCNC: 25 MG/DL (ref 7–18)
CALCIUM BLD-MCNC: 9.1 MG/DL (ref 8.5–10.1)
CHLORIDE SERPL-SCNC: 110 MMOL/L (ref 98–112)
CO2 SERPL-SCNC: 24 MMOL/L (ref 21–32)
CREAT BLD-MCNC: 1.5 MG/DL
FASTING STATUS PATIENT QL REPORTED: YES
GFR SERPLBLD BASED ON 1.73 SQ M-ARVRAT: 54 ML/MIN/1.73M2 (ref 60–?)
GLUCOSE BLD-MCNC: 96 MG/DL (ref 70–99)
OSMOLALITY SERPL CALC.SUM OF ELEC: 294 MOSM/KG (ref 275–295)
POTASSIUM SERPL-SCNC: 3.9 MMOL/L (ref 3.5–5.1)
SODIUM SERPL-SCNC: 140 MMOL/L (ref 136–145)

## 2023-02-11 PROCEDURE — 36415 COLL VENOUS BLD VENIPUNCTURE: CPT

## 2023-02-11 PROCEDURE — 80048 BASIC METABOLIC PNL TOTAL CA: CPT

## 2023-02-13 DIAGNOSIS — R74.8 ELEVATED CREATINE KINASE: Primary | ICD-10-CM

## 2023-02-13 DIAGNOSIS — Z13.29 SCREENING FOR ENDOCRINE DISORDER: ICD-10-CM

## 2023-02-14 DIAGNOSIS — N28.9 ABNORMAL KIDNEY FUNCTION: Primary | ICD-10-CM

## 2023-02-20 DIAGNOSIS — G25.0 BENIGN ESSENTIAL TREMOR SYNDROME: ICD-10-CM

## 2023-02-20 RX ORDER — ZONISAMIDE 50 MG/1
CAPSULE ORAL
Qty: 30 CAPSULE | Refills: 0 | Status: SHIPPED | OUTPATIENT
Start: 2023-02-20

## 2023-03-06 ENCOUNTER — OFFICE VISIT (OUTPATIENT)
Dept: NEUROLOGY | Facility: CLINIC | Age: 58
End: 2023-03-06
Payer: COMMERCIAL

## 2023-03-06 VITALS
WEIGHT: 220 LBS | HEART RATE: 72 BPM | BODY MASS INDEX: 31.14 KG/M2 | SYSTOLIC BLOOD PRESSURE: 118 MMHG | DIASTOLIC BLOOD PRESSURE: 74 MMHG | RESPIRATION RATE: 16 BRPM | HEIGHT: 70.5 IN

## 2023-03-06 DIAGNOSIS — G25.0 BENIGN ESSENTIAL TREMOR SYNDROME: ICD-10-CM

## 2023-03-06 PROCEDURE — 3008F BODY MASS INDEX DOCD: CPT | Performed by: PHYSICIAN ASSISTANT

## 2023-03-06 PROCEDURE — 99213 OFFICE O/P EST LOW 20 MIN: CPT | Performed by: PHYSICIAN ASSISTANT

## 2023-03-06 PROCEDURE — 3074F SYST BP LT 130 MM HG: CPT | Performed by: PHYSICIAN ASSISTANT

## 2023-03-06 PROCEDURE — 3078F DIAST BP <80 MM HG: CPT | Performed by: PHYSICIAN ASSISTANT

## 2023-03-06 RX ORDER — ZONISAMIDE 50 MG/1
50 CAPSULE ORAL DAILY
Qty: 90 CAPSULE | Refills: 3 | Status: SHIPPED | OUTPATIENT
Start: 2023-03-06

## 2023-04-17 NOTE — TELEPHONE ENCOUNTER
Letter of determination received from [facility] Rogelio, Tadalafil 20mg tabs has been approved 3/18/2020-3/18/2023. Called to pt's [facility], spoke with tech Hines Lefort who voiced understanding and agreed to plan.
PA completed for Tadalafil 20 mg via covermymeds. Will await determination.
PACs  Bradycardia    Hold BB for now  Cardio f-up  POssible PPM

## 2023-04-19 DIAGNOSIS — G25.0 BENIGN ESSENTIAL TREMOR SYNDROME: ICD-10-CM

## 2023-04-19 RX ORDER — ZONISAMIDE 50 MG/1
50 CAPSULE ORAL DAILY
Qty: 90 CAPSULE | Refills: 3 | Status: SHIPPED | OUTPATIENT
Start: 2023-04-19

## 2023-04-19 NOTE — TELEPHONE ENCOUNTER
Zonisamide 50mg #90/3R sent to mail order 3/6/23  Pt is requesting Rx to be sent to local [place]    Rx pending per pt request

## 2023-06-24 ENCOUNTER — LAB ENCOUNTER (OUTPATIENT)
Dept: LAB | Facility: HOSPITAL | Age: 58
End: 2023-06-24
Attending: FAMILY MEDICINE
Payer: COMMERCIAL

## 2023-06-24 DIAGNOSIS — N28.9 ABNORMAL KIDNEY FUNCTION: ICD-10-CM

## 2023-06-24 LAB
ANION GAP SERPL CALC-SCNC: 3 MMOL/L (ref 0–18)
BUN BLD-MCNC: 20 MG/DL (ref 7–18)
CALCIUM BLD-MCNC: 8.6 MG/DL (ref 8.5–10.1)
CHLORIDE SERPL-SCNC: 110 MMOL/L (ref 98–112)
CO2 SERPL-SCNC: 25 MMOL/L (ref 21–32)
CREAT BLD-MCNC: 1.48 MG/DL
FASTING STATUS PATIENT QL REPORTED: YES
GFR SERPLBLD BASED ON 1.73 SQ M-ARVRAT: 55 ML/MIN/1.73M2 (ref 60–?)
GLUCOSE BLD-MCNC: 99 MG/DL (ref 70–99)
OSMOLALITY SERPL CALC.SUM OF ELEC: 289 MOSM/KG (ref 275–295)
POTASSIUM SERPL-SCNC: 3.8 MMOL/L (ref 3.5–5.1)
SODIUM SERPL-SCNC: 138 MMOL/L (ref 136–145)

## 2023-06-24 PROCEDURE — 36415 COLL VENOUS BLD VENIPUNCTURE: CPT

## 2023-06-24 PROCEDURE — 80048 BASIC METABOLIC PNL TOTAL CA: CPT

## 2023-06-26 ENCOUNTER — PATIENT MESSAGE (OUTPATIENT)
Dept: FAMILY MEDICINE CLINIC | Facility: CLINIC | Age: 58
End: 2023-06-26

## 2023-06-26 DIAGNOSIS — R94.4 DECREASED GFR: ICD-10-CM

## 2023-06-26 DIAGNOSIS — R79.89 CREATININE ELEVATION: Primary | ICD-10-CM

## 2023-07-08 ENCOUNTER — LAB ENCOUNTER (OUTPATIENT)
Dept: LAB | Facility: HOSPITAL | Age: 58
End: 2023-07-08
Attending: FAMILY MEDICINE
Payer: COMMERCIAL

## 2023-07-08 DIAGNOSIS — R94.4 DECREASED GFR: ICD-10-CM

## 2023-07-08 DIAGNOSIS — R74.8 ELEVATED CREATINE KINASE: ICD-10-CM

## 2023-07-08 DIAGNOSIS — Z13.29 SCREENING FOR ENDOCRINE DISORDER: ICD-10-CM

## 2023-07-08 DIAGNOSIS — R79.89 CREATININE ELEVATION: ICD-10-CM

## 2023-07-08 LAB
ALBUMIN SERPL-MCNC: 3.7 G/DL (ref 3.4–5)
ALBUMIN/GLOB SERPL: 1.2 {RATIO} (ref 1–2)
ALP LIVER SERPL-CCNC: 73 U/L
ALT SERPL-CCNC: 30 U/L
ANION GAP SERPL CALC-SCNC: 7 MMOL/L (ref 0–18)
AST SERPL-CCNC: 14 U/L (ref 15–37)
BILIRUB SERPL-MCNC: 0.8 MG/DL (ref 0.1–2)
BUN BLD-MCNC: 22 MG/DL (ref 7–18)
CALCIUM BLD-MCNC: 8.9 MG/DL (ref 8.5–10.1)
CHLORIDE SERPL-SCNC: 111 MMOL/L (ref 98–112)
CO2 SERPL-SCNC: 22 MMOL/L (ref 21–32)
CREAT BLD-MCNC: 1.54 MG/DL
EST. AVERAGE GLUCOSE BLD GHB EST-MCNC: 105 MG/DL (ref 68–126)
FASTING STATUS PATIENT QL REPORTED: YES
GFR SERPLBLD BASED ON 1.73 SQ M-ARVRAT: 52 ML/MIN/1.73M2 (ref 60–?)
GLOBULIN PLAS-MCNC: 3.1 G/DL (ref 2.8–4.4)
GLUCOSE BLD-MCNC: 99 MG/DL (ref 70–99)
HBA1C MFR BLD: 5.3 % (ref ?–5.7)
OSMOLALITY SERPL CALC.SUM OF ELEC: 293 MOSM/KG (ref 275–295)
POTASSIUM SERPL-SCNC: 3.6 MMOL/L (ref 3.5–5.1)
PROT SERPL-MCNC: 6.8 G/DL (ref 6.4–8.2)
SODIUM SERPL-SCNC: 140 MMOL/L (ref 136–145)

## 2023-07-08 PROCEDURE — 83036 HEMOGLOBIN GLYCOSYLATED A1C: CPT

## 2023-07-08 PROCEDURE — 36415 COLL VENOUS BLD VENIPUNCTURE: CPT

## 2023-07-08 PROCEDURE — 80053 COMPREHEN METABOLIC PANEL: CPT

## 2023-07-08 PROCEDURE — 82610 CYSTATIN C: CPT

## 2023-07-11 LAB
CYSTATIN C: 0.95 MG/L
EGFR: 84 ML/MIN/1.73

## 2023-07-13 ENCOUNTER — OFFICE VISIT (OUTPATIENT)
Dept: FAMILY MEDICINE CLINIC | Facility: CLINIC | Age: 58
End: 2023-07-13
Payer: COMMERCIAL

## 2023-07-13 VITALS
RESPIRATION RATE: 16 BRPM | BODY MASS INDEX: 30.16 KG/M2 | HEIGHT: 70.5 IN | SYSTOLIC BLOOD PRESSURE: 122 MMHG | HEART RATE: 68 BPM | DIASTOLIC BLOOD PRESSURE: 70 MMHG | TEMPERATURE: 98 F | WEIGHT: 213 LBS

## 2023-07-13 DIAGNOSIS — E66.09 CLASS 1 OBESITY DUE TO EXCESS CALORIES WITHOUT SERIOUS COMORBIDITY WITH BODY MASS INDEX (BMI) OF 30.0 TO 30.9 IN ADULT: ICD-10-CM

## 2023-07-13 DIAGNOSIS — L40.0 PLAQUE PSORIASIS: ICD-10-CM

## 2023-07-13 DIAGNOSIS — L40.50 PSORIATIC ARTHRITIS (HCC): Primary | ICD-10-CM

## 2023-07-13 PROCEDURE — 3074F SYST BP LT 130 MM HG: CPT | Performed by: FAMILY MEDICINE

## 2023-07-13 PROCEDURE — 3078F DIAST BP <80 MM HG: CPT | Performed by: FAMILY MEDICINE

## 2023-07-13 PROCEDURE — 3008F BODY MASS INDEX DOCD: CPT | Performed by: FAMILY MEDICINE

## 2023-07-13 PROCEDURE — 99214 OFFICE O/P EST MOD 30 MIN: CPT | Performed by: FAMILY MEDICINE

## 2023-07-13 RX ORDER — TIRZEPATIDE 15 MG/.5ML
INJECTION, SOLUTION SUBCUTANEOUS
COMMUNITY
Start: 2023-05-15

## 2023-07-13 RX ORDER — ATROPINE SULFATE MONOHYDRATE 1 G/G
POWDER MISCELLANEOUS
COMMUNITY
Start: 2023-02-22

## 2023-07-13 RX ORDER — TESTOSTERONE CYPIONATE, MICRO 100 %
POWDER (GRAM) MISCELLANEOUS
COMMUNITY
Start: 2023-06-14

## 2023-07-19 ENCOUNTER — OFFICE VISIT (OUTPATIENT)
Dept: FAMILY MEDICINE CLINIC | Facility: CLINIC | Age: 58
End: 2023-07-19
Payer: COMMERCIAL

## 2023-07-19 VITALS
HEART RATE: 80 BPM | BODY MASS INDEX: 30.16 KG/M2 | OXYGEN SATURATION: 98 % | RESPIRATION RATE: 16 BRPM | SYSTOLIC BLOOD PRESSURE: 126 MMHG | HEIGHT: 70.5 IN | DIASTOLIC BLOOD PRESSURE: 80 MMHG | WEIGHT: 213 LBS | TEMPERATURE: 99 F

## 2023-07-19 DIAGNOSIS — U07.1 COVID-19 VIRUS INFECTION: Primary | ICD-10-CM

## 2023-07-19 DIAGNOSIS — R09.81 SINUS CONGESTION: ICD-10-CM

## 2023-07-19 DIAGNOSIS — R52 WHOLE BODY PAIN: ICD-10-CM

## 2023-07-19 LAB
OPERATOR ID: ABNORMAL
POCT LOT NUMBER: ABNORMAL
RAPID SARS-COV-2 BY PCR: DETECTED

## 2023-07-19 PROCEDURE — 3079F DIAST BP 80-89 MM HG: CPT | Performed by: FAMILY MEDICINE

## 2023-07-19 PROCEDURE — 99213 OFFICE O/P EST LOW 20 MIN: CPT | Performed by: FAMILY MEDICINE

## 2023-07-19 PROCEDURE — 3008F BODY MASS INDEX DOCD: CPT | Performed by: FAMILY MEDICINE

## 2023-07-19 PROCEDURE — U0002 COVID-19 LAB TEST NON-CDC: HCPCS | Performed by: FAMILY MEDICINE

## 2023-07-19 PROCEDURE — 3074F SYST BP LT 130 MM HG: CPT | Performed by: FAMILY MEDICINE

## 2023-08-14 ENCOUNTER — OFFICE VISIT (OUTPATIENT)
Dept: NEPHROLOGY | Facility: CLINIC | Age: 58
End: 2023-08-14
Payer: COMMERCIAL

## 2023-08-14 VITALS — SYSTOLIC BLOOD PRESSURE: 140 MMHG | BODY MASS INDEX: 30 KG/M2 | WEIGHT: 213.38 LBS | DIASTOLIC BLOOD PRESSURE: 76 MMHG

## 2023-08-14 DIAGNOSIS — R79.89 ELEVATED SERUM CREATININE: Primary | ICD-10-CM

## 2023-08-21 ENCOUNTER — TELEPHONE (OUTPATIENT)
Dept: FAMILY MEDICINE CLINIC | Facility: CLINIC | Age: 58
End: 2023-08-21

## 2023-08-21 NOTE — TELEPHONE ENCOUNTER
I called the pt. He is having issues with hemorrhoids. He has had this in the past. He has not had any bleeding or pain. He has not tried anything OTC. He would like a prescription.

## 2023-08-21 NOTE — TELEPHONE ENCOUNTER
Patient calling stating that he believes he has hemorrhoids. He has had them in the past. Patient would  like to know if he be seen with Dr. Senia Michelle before he goes to a specialist. Patient states that he is not in any pain. Please advise. Thank you!

## 2023-08-21 NOTE — TELEPHONE ENCOUNTER
Since he is not in a lot of pain, I think would make more sense for him to be seen first before we treat him

## 2023-08-24 ENCOUNTER — OFFICE VISIT (OUTPATIENT)
Dept: FAMILY MEDICINE CLINIC | Facility: CLINIC | Age: 58
End: 2023-08-24
Payer: COMMERCIAL

## 2023-08-24 VITALS
HEIGHT: 72.25 IN | DIASTOLIC BLOOD PRESSURE: 78 MMHG | TEMPERATURE: 98 F | SYSTOLIC BLOOD PRESSURE: 132 MMHG | BODY MASS INDEX: 28.27 KG/M2 | WEIGHT: 211 LBS | HEART RATE: 80 BPM

## 2023-08-24 DIAGNOSIS — L08.9 INFECTED SEBACEOUS CYST: ICD-10-CM

## 2023-08-24 DIAGNOSIS — L72.3 INFECTED SEBACEOUS CYST: ICD-10-CM

## 2023-08-24 DIAGNOSIS — K64.1 GRADE II HEMORRHOIDS: Primary | ICD-10-CM

## 2023-08-24 PROCEDURE — 3078F DIAST BP <80 MM HG: CPT | Performed by: FAMILY MEDICINE

## 2023-08-24 PROCEDURE — 99214 OFFICE O/P EST MOD 30 MIN: CPT | Performed by: FAMILY MEDICINE

## 2023-08-24 PROCEDURE — 3008F BODY MASS INDEX DOCD: CPT | Performed by: FAMILY MEDICINE

## 2023-08-24 PROCEDURE — 3075F SYST BP GE 130 - 139MM HG: CPT | Performed by: FAMILY MEDICINE

## 2023-08-24 RX ORDER — AMOXICILLIN AND CLAVULANATE POTASSIUM 875; 125 MG/1; MG/1
1 TABLET, FILM COATED ORAL 2 TIMES DAILY
Qty: 14 TABLET | Refills: 0 | Status: SHIPPED | OUTPATIENT
Start: 2023-08-24 | End: 2023-08-31

## 2023-08-24 RX ORDER — HYDROCORTISONE 25 MG/G
1 CREAM TOPICAL 3 TIMES DAILY
Qty: 28 G | Refills: 1 | Status: SHIPPED | OUTPATIENT
Start: 2023-08-24 | End: 2023-08-31

## 2023-09-25 ENCOUNTER — LAB ENCOUNTER (OUTPATIENT)
Dept: LAB | Age: 58
End: 2023-09-25
Attending: INTERNAL MEDICINE
Payer: COMMERCIAL

## 2023-09-25 ENCOUNTER — HOSPITAL ENCOUNTER (OUTPATIENT)
Dept: ULTRASOUND IMAGING | Age: 58
Discharge: HOME | End: 2023-09-25
Attending: INTERNAL MEDICINE
Payer: COMMERCIAL

## 2023-09-25 DIAGNOSIS — R79.89 ELEVATED SERUM CREATININE: ICD-10-CM

## 2023-09-25 LAB
CREAT UR-SCNC: 19.5 MG/DL
MICROALBUMIN UR-MCNC: <0.5 MG/DL

## 2023-09-25 PROCEDURE — 82043 UR ALBUMIN QUANTITATIVE: CPT

## 2023-09-25 PROCEDURE — 76770 US EXAM ABDO BACK WALL COMP: CPT | Performed by: INTERNAL MEDICINE

## 2023-09-25 PROCEDURE — 82570 ASSAY OF URINE CREATININE: CPT

## 2023-10-12 ENCOUNTER — OFFICE VISIT (OUTPATIENT)
Dept: FAMILY MEDICINE CLINIC | Facility: CLINIC | Age: 58
End: 2023-10-12
Payer: COMMERCIAL

## 2023-10-12 VITALS
DIASTOLIC BLOOD PRESSURE: 72 MMHG | HEIGHT: 72.25 IN | TEMPERATURE: 98 F | HEART RATE: 72 BPM | WEIGHT: 213.63 LBS | BODY MASS INDEX: 28.62 KG/M2 | SYSTOLIC BLOOD PRESSURE: 132 MMHG | RESPIRATION RATE: 16 BRPM

## 2023-10-12 DIAGNOSIS — Z23 NEED FOR VACCINATION: ICD-10-CM

## 2023-10-12 DIAGNOSIS — L72.3 INFECTED SEBACEOUS CYST: ICD-10-CM

## 2023-10-12 DIAGNOSIS — N52.8 OTHER MALE ERECTILE DYSFUNCTION: ICD-10-CM

## 2023-10-12 DIAGNOSIS — L08.9 INFECTED SEBACEOUS CYST: ICD-10-CM

## 2023-10-12 DIAGNOSIS — R35.1 NOCTURIA MORE THAN TWICE PER NIGHT: Primary | ICD-10-CM

## 2023-10-12 PROCEDURE — 99214 OFFICE O/P EST MOD 30 MIN: CPT | Performed by: STUDENT IN AN ORGANIZED HEALTH CARE EDUCATION/TRAINING PROGRAM

## 2023-10-12 PROCEDURE — 90686 IIV4 VACC NO PRSV 0.5 ML IM: CPT | Performed by: STUDENT IN AN ORGANIZED HEALTH CARE EDUCATION/TRAINING PROGRAM

## 2023-10-12 PROCEDURE — 3078F DIAST BP <80 MM HG: CPT | Performed by: STUDENT IN AN ORGANIZED HEALTH CARE EDUCATION/TRAINING PROGRAM

## 2023-10-12 PROCEDURE — 3008F BODY MASS INDEX DOCD: CPT | Performed by: STUDENT IN AN ORGANIZED HEALTH CARE EDUCATION/TRAINING PROGRAM

## 2023-10-12 PROCEDURE — 90471 IMMUNIZATION ADMIN: CPT | Performed by: STUDENT IN AN ORGANIZED HEALTH CARE EDUCATION/TRAINING PROGRAM

## 2023-10-12 PROCEDURE — 3075F SYST BP GE 130 - 139MM HG: CPT | Performed by: STUDENT IN AN ORGANIZED HEALTH CARE EDUCATION/TRAINING PROGRAM

## 2023-10-12 RX ORDER — AMOXICILLIN AND CLAVULANATE POTASSIUM 875; 125 MG/1; MG/1
1 TABLET, FILM COATED ORAL 2 TIMES DAILY
Qty: 20 TABLET | Refills: 0 | Status: SHIPPED | OUTPATIENT
Start: 2023-10-12 | End: 2023-10-22

## 2023-10-12 RX ORDER — TADALAFIL 20 MG/1
20 TABLET ORAL
Qty: 30 TABLET | Refills: 3 | Status: SHIPPED | OUTPATIENT
Start: 2023-10-12

## 2023-10-12 RX ORDER — TAMSULOSIN HYDROCHLORIDE 0.4 MG/1
0.4 CAPSULE ORAL DAILY
Qty: 90 CAPSULE | Refills: 0 | Status: SHIPPED | OUTPATIENT
Start: 2023-10-12

## 2023-11-27 ENCOUNTER — OFFICE VISIT (OUTPATIENT)
Dept: FAMILY MEDICINE CLINIC | Facility: CLINIC | Age: 58
End: 2023-11-27
Payer: COMMERCIAL

## 2023-11-27 ENCOUNTER — HOSPITAL ENCOUNTER (OUTPATIENT)
Dept: ULTRASOUND IMAGING | Facility: HOSPITAL | Age: 58
Discharge: HOME | End: 2023-11-27
Payer: COMMERCIAL

## 2023-11-27 ENCOUNTER — TELEPHONE (OUTPATIENT)
Dept: FAMILY MEDICINE CLINIC | Facility: CLINIC | Age: 58
End: 2023-11-27

## 2023-11-27 VITALS
HEIGHT: 73 IN | HEART RATE: 84 BPM | BODY MASS INDEX: 28.76 KG/M2 | DIASTOLIC BLOOD PRESSURE: 84 MMHG | TEMPERATURE: 98 F | WEIGHT: 217 LBS | RESPIRATION RATE: 16 BRPM | SYSTOLIC BLOOD PRESSURE: 120 MMHG

## 2023-11-27 DIAGNOSIS — R19.09 INGUINAL BULGE: Primary | ICD-10-CM

## 2023-11-27 DIAGNOSIS — R39.11 URINARY HESITANCY: ICD-10-CM

## 2023-11-27 DIAGNOSIS — R19.09 INGUINAL BULGE: ICD-10-CM

## 2023-11-27 DIAGNOSIS — R80.9 PROTEINURIA, UNSPECIFIED TYPE: ICD-10-CM

## 2023-11-27 LAB
APPEARANCE: CLEAR
BILIRUBIN: NEGATIVE
GLUCOSE (URINE DIPSTICK): NEGATIVE MG/DL
LEUKOCYTES: NEGATIVE
MULTISTIX EXPIRATION DATE: ABNORMAL DATE
MULTISTIX LOT#: ABNORMAL NUMERIC
NITRITE, URINE: NEGATIVE
OCCULT BLOOD: NEGATIVE
PH, URINE: 6 (ref 4.5–8)
PROTEIN (URINE DIPSTICK): 30 MG/DL
SPECIFIC GRAVITY: 1.02 (ref 1–1.03)
URINE-COLOR: YELLOW
UROBILINOGEN,SEMI-QN: 0.2 MG/DL (ref 0–1.9)

## 2023-11-27 PROCEDURE — 3079F DIAST BP 80-89 MM HG: CPT

## 2023-11-27 PROCEDURE — 3008F BODY MASS INDEX DOCD: CPT

## 2023-11-27 PROCEDURE — 99214 OFFICE O/P EST MOD 30 MIN: CPT

## 2023-11-27 PROCEDURE — 81003 URINALYSIS AUTO W/O SCOPE: CPT

## 2023-11-27 PROCEDURE — 3074F SYST BP LT 130 MM HG: CPT

## 2023-11-27 PROCEDURE — 76882 US LMTD JT/FCL EVL NVASC XTR: CPT

## 2023-11-27 NOTE — TELEPHONE ENCOUNTER
S/w pt  Reports he did heavy lifting weds, leg day  Reports what he feels is a hernia, inguinal area  Sts he has seen before and is 90% sure  that is what this is  No pain currently but has bouts where it feels uncomfortable  Bulging at times, feels about size of golfball but does not protrude to that size  No fevers  Asking to be seen today. Caryle Breen advised me that it is OK to book with Elicia Guerra today for this as Dr Ildefonso Gaxiola is not overbooking. Pt agrees to see Sanjuanita Zendejas booked.

## 2023-11-27 NOTE — TELEPHONE ENCOUNTER
Pt  thinks he has developed a hernia and would like appt. He noticed it first on Thursday. Please advise. Thank you.

## 2023-11-27 NOTE — PATIENT INSTRUCTIONS
Ultrasound of the groin scheduled for 5:00 PM today. Schedule appointment with / general surgery     Go to the nearest emergency room if you experience any severe pain, nausea, vomiting, inability to pass gas/ have a bowel movement, or high fevers.  See attached handout     Repeat well hydrated urinalysis in 1 week at the lab

## 2023-11-28 ENCOUNTER — TELEPHONE (OUTPATIENT)
Dept: FAMILY MEDICINE CLINIC | Facility: CLINIC | Age: 58
End: 2023-11-28

## 2023-11-28 RX ORDER — DICLOFENAC SODIUM 75 MG/1
75 TABLET, DELAYED RELEASE ORAL 2 TIMES DAILY
Qty: 20 TABLET | Refills: 0 | Status: SHIPPED | OUTPATIENT
Start: 2023-11-28 | End: 2023-12-08

## 2023-11-28 NOTE — TELEPHONE ENCOUNTER
Patient was called last night and left a voicemail informing him that the ultrasound was negative and to follow through with the recommendations from the nurse practitioner

## 2023-12-13 ENCOUNTER — OFFICE VISIT (OUTPATIENT)
Facility: LOCATION | Age: 58
End: 2023-12-13
Payer: COMMERCIAL

## 2023-12-13 DIAGNOSIS — K40.90 REDUCIBLE RIGHT INGUINAL HERNIA: Primary | ICD-10-CM

## 2023-12-13 PROCEDURE — 99204 OFFICE O/P NEW MOD 45 MIN: CPT | Performed by: SURGERY

## 2023-12-13 NOTE — H&P
New Patient Visit Note       Active Problems      1. Reducible right inguinal hernia        Chief Complaint   Chief Complaint   Patient presents with    Hernia     NP- Inguinal Hernia - Groin US 11/27 - Pt state he is able to feel hernia when standing       History of Present Illness     The patient presents at the request of his primary care physician for evaluation of a right inguinal hernia. Patient states that while lifting weights several weeks ago he noticed a bulge with associated pain in the right groin. The bulge comes and goes; the bulge is more prominent with exertion. The patient has pain in the inguinal region that occasionally radiates into the right hemiscrotum. As part of the workup, primary care ordered an ultrasound, however, the ultrasound did not confirm or reveal an inguinal hernia. I reviewed the images. The patient denies fever, chills, chest pain, shortness of breath, dyspnea. The patient also denies hematemesis, melena, or hematochezia. The patient denies change in bowel or bladder habits. There is no complaint of hematuria or dysuria. I discussed the risk, benefits, alternatives of robotic right inguinal hernia repair with mesh. We discussed the anticipated postoperative recovery including dietary, activity, exercise and lifestyle recommendations. Patient's questions were answered to his understanding. Allergies  Jackson has No Known Allergies. Past Medical / Surgical / Social / Family History    The past medical and past surgical history have been reviewed by me today.     Past Medical History:   Diagnosis Date    Arthritis     Psoriasis     Tremor      Past Surgical History:   Procedure Laterality Date    BIOPSY OF THYROID,PERCUT  2009    COLONOSCOPY  2009    complete    COLONOSCOPY  2014,2010    x2 Dr. Mindi Craven Bilateral left-4/2017 and right-2011--Dr. Mcmillan Mons    wrist ganglion cyst excision    HEMORRHOIDECTOMY      OTHER SURGICAL HISTORY 2004    Upper Gastrointestinal Endoscopy    TONSILLECTOMY  10/2018       The family history and social history have been reviewed by me today. History reviewed. No pertinent family history. Social History     Socioeconomic History    Marital status:    Tobacco Use    Smoking status: Never    Smokeless tobacco: Never   Vaping Use    Vaping Use: Never used   Substance and Sexual Activity    Alcohol use: Yes     Alcohol/week: 0.0 standard drinks of alcohol     Comment: social 2-3 BEERSOR MIXED DRINKS WEEKLY    Drug use: No   Other Topics Concern    Caffeine Concern Yes     Comment: 1 cup every other day    Exercise Yes     Comment: 3-4x a week        Current Outpatient Medications:     tamsulosin 0.4 MG Oral Cap, Take 1 capsule (0.4 mg total) by mouth daily. , Disp: 90 capsule, Rfl: 0    Tadalafil 20 MG Oral Tab, Take 1 tablet (20 mg total) by mouth daily as needed for Erectile Dysfunction. , Disp: 30 tablet, Rfl: 3    MOUNJARO 15 MG/0.5ML Subcutaneous Solution Pen-injector, Inject 50 units once a week, Disp: , Rfl:     Nandrolone Decanoate Does not apply Powder, 30 units once a week, Disp: , Rfl:     Testosterone Cypionate Does not apply Powder, 90 units once a week, Disp: , Rfl:     zonisamide 50 MG Oral Cap, Take 1 capsule (50 mg total) by mouth daily. , Disp: 90 capsule, Rfl: 3    FOLIC ACID 1 MG Oral Tab, TAKE ONE TABLET BY MOUTH ONE TIME DAILY, Disp: 90 tablet, Rfl: 0    methotrexate 2.5 MG Oral Tab, Take 7 tablets (17.5 mg total) by mouth every 7 days. , Disp: 135 tablet, Rfl: 1    Adalimumab (HUMIRA PEN) 40 MG/0.4ML Subcutaneous Pen-injector Kit, Inject 40 mg into the skin every 14 (fourteen) days. , Disp: 6 each, Rfl: 3    Lansoprazole 15 MG Oral Tablet Delayed Release Dispersible, qd, Disp: , Rfl:     finasteride 1 MG Oral Tab, One tab daily, Disp: , Rfl:     Multiple Vitamin (MULTI-DAY VITAMINS) Oral Tab, Take by mouth., Disp: , Rfl:     Cetirizine HCl 5 MG Oral Tab, Take 1 tablet (5 mg total) by mouth daily., Disp: , Rfl:     Omega-3 Fatty Acids (FISH OIL OR), 1 qd, Disp: , Rfl:     Cholecalciferol (VITAMIN D) 2000 UNITS Oral Cap, Take  by mouth daily. , Disp: , Rfl:       Review of Systems  The Review of Systems has been reviewed by me during today. Review of Systems   Constitutional:  Negative for chills, diaphoresis, fatigue, fever and unexpected weight change. HENT:  Negative for hearing loss, nosebleeds, sore throat and trouble swallowing. Respiratory:  Negative for apnea, cough, shortness of breath and wheezing. Cardiovascular:  Negative for chest pain, palpitations and leg swelling. Gastrointestinal:  Negative for abdominal distention, abdominal pain, anal bleeding, blood in stool, constipation, diarrhea, nausea and vomiting. Genitourinary:  Negative for difficulty urinating, dysuria, frequency and urgency. Musculoskeletal:  Negative for arthralgias and myalgias. Skin:  Negative for color change and rash. Neurological:  Negative for tremors, syncope and weakness. Hematological:  Negative for adenopathy. Does not bruise/bleed easily. Psychiatric/Behavioral:  Negative for behavioral problems and sleep disturbance. Physical Findings   There were no vitals taken for this visit. Physical Exam  Vitals and nursing note reviewed. Constitutional:       General: He is not in acute distress. Appearance: He is well-developed. HENT:      Head: Normocephalic and atraumatic. Eyes:      General: No scleral icterus. Pupils: Pupils are equal, round, and reactive to light. Neck:      Vascular: No JVD. Trachea: No tracheal deviation. Cardiovascular:      Rate and Rhythm: Normal rate and regular rhythm. Pulmonary:      Effort: Pulmonary effort is normal. No respiratory distress. Breath sounds: No stridor. Abdominal:      General: Bowel sounds are normal. There is no distension. Palpations: Abdomen is soft. Abdomen is not rigid. There is no mass. Tenderness: There is no abdominal tenderness. There is no guarding or rebound. Negative signs include García's sign and McBurney's sign. Hernia: A hernia is present. Hernia is present in the right inguinal area. There is no hernia in the left inguinal area. Genitourinary:     Penis: Normal and circumcised. No phimosis, paraphimosis, hypospadias, erythema, tenderness, discharge, swelling or lesions. Testes: Normal.         Right: Mass, tenderness, swelling, testicular hydrocele or varicocele not present. Right testis is descended. Cremasteric reflex is present. Left: Mass, tenderness, swelling, testicular hydrocele or varicocele not present. Left testis is descended. Cremasteric reflex is present. Epididymis:      Right: Normal.      Left: Normal.       Musculoskeletal:         General: Normal range of motion. Cervical back: Normal range of motion and neck supple. Skin:     General: Skin is warm and dry. Neurological:      Mental Status: He is alert and oriented to person, place, and time. Psychiatric:         Behavior: Behavior normal.             Assessment   1. Reducible right inguinal hernia          Plan     The patient will be scheduled for robotic right inguinal hernia repair with mesh. The lupis-operative care plan was discussed with the patient, who voices understanding. Activity and lifting recommendations were discussed in length. The risks, benefits, and alternatives to the procedure were explained to the patient. The risks explained include, but are not limited to, bleeding, infection, pain wound complications, recurrence, incorrect diagnosis, injury to adjacent organs and structures. We also discussed the possibile need for further therapeutic, diagnostic, or surgical intervention. The patient voiced understanding, and after all questions were answered to the patient's satisfaction, the patient provided willing and informed consent to proceed.      No orders of the defined types were placed in this encounter. Imaging & Referrals   None    Follow Up  No follow-ups on file.     Fish Garcia MD

## 2023-12-30 ENCOUNTER — OFFICE VISIT (OUTPATIENT)
Dept: FAMILY MEDICINE CLINIC | Facility: CLINIC | Age: 58
End: 2023-12-30
Payer: COMMERCIAL

## 2023-12-30 VITALS
HEART RATE: 112 BPM | SYSTOLIC BLOOD PRESSURE: 114 MMHG | OXYGEN SATURATION: 96 % | TEMPERATURE: 100 F | BODY MASS INDEX: 29.03 KG/M2 | DIASTOLIC BLOOD PRESSURE: 72 MMHG | RESPIRATION RATE: 18 BRPM | HEIGHT: 73 IN | WEIGHT: 219 LBS

## 2023-12-30 DIAGNOSIS — J11.1 INFLUENZA-LIKE ILLNESS: Primary | ICD-10-CM

## 2023-12-30 LAB
OPERATOR ID: NORMAL
RAPID SARS-COV-2 BY PCR: NOT DETECTED

## 2023-12-30 PROCEDURE — 3074F SYST BP LT 130 MM HG: CPT | Performed by: NURSE PRACTITIONER

## 2023-12-30 PROCEDURE — 99213 OFFICE O/P EST LOW 20 MIN: CPT | Performed by: NURSE PRACTITIONER

## 2023-12-30 PROCEDURE — U0002 COVID-19 LAB TEST NON-CDC: HCPCS | Performed by: NURSE PRACTITIONER

## 2023-12-30 PROCEDURE — 3008F BODY MASS INDEX DOCD: CPT | Performed by: NURSE PRACTITIONER

## 2023-12-30 PROCEDURE — 3078F DIAST BP <80 MM HG: CPT | Performed by: NURSE PRACTITIONER

## 2023-12-30 NOTE — PATIENT INSTRUCTIONS
Self care for viral illnesses:    May start Sambuccal Elderberry syrup or tablets as directed on package to help reduce length and severity of illness if out of the window to use antiviral medication. You may also use measures as below to help with comfort at the same time. Salt water gargles (1 tsp. Salt in 6 oz lukewarm water), use several times daily to help remove post nasal drainage and soothe throat. Saline nasal spray such as Ocean or Simply Saline to nostrils 2-3 times daily to help soothe tissues and keep mucus thin. Hydrate! (cold or hot based on comfort). Drink lots of water or other non dehydrating liquids to help with illness. May use humidifier in bedroom at night to help with congestion. Hot steamy showers can loosen congestion and help cough. Tomas's vapor rub to chest can quiet cough. Hand washing-use hand  or wash hands frequently, cover your cough or sneeze, do not share towels or drinks with others. May take over the counter multi symptom medication as directed on package if needed. May use Tylenol or Ibuprofen over the counter for pain/comfort if not included in multi symptom medication. No work or school until fever free for 24 hours. Follow up in 10-14 days after illness started with primary care if symptoms not complete resolved or sooner if worsening symptoms. Seek immediate care if inability to swallow or breathe or if symptoms improve greatly then worsen again.

## 2024-01-01 DIAGNOSIS — R35.1 NOCTURIA MORE THAN TWICE PER NIGHT: ICD-10-CM

## 2024-01-02 NOTE — TELEPHONE ENCOUNTER
Last office visit: 10/12/2023   Labs last completed: 7/8/2023  Requested medication(s) are due for refill today: yes  Requested medication(s) are on the active medication list same strength, form, dose/ sig: yes  Requested medication(s) are managed by provider: yes  Patient has already received a courtsey refill: no

## 2024-01-03 RX ORDER — TAMSULOSIN HYDROCHLORIDE 0.4 MG/1
0.4 CAPSULE ORAL DAILY
Qty: 90 CAPSULE | Refills: 0 | Status: SHIPPED | OUTPATIENT
Start: 2024-01-03

## 2024-02-01 ENCOUNTER — PATIENT MESSAGE (OUTPATIENT)
Facility: LOCATION | Age: 59
End: 2024-02-01

## 2024-02-01 DIAGNOSIS — K40.90 REDUCIBLE RIGHT INGUINAL HERNIA: Primary | ICD-10-CM

## 2024-02-01 NOTE — TELEPHONE ENCOUNTER
Rescheduled surgery to 2/19/2024 with Dr. Thompson. Notified patient via [employer].     Orders Placed This Encounter   Procedures    Surgical Case Change Request     Case ID: [id number]    Rescheduled to 2/19/2024

## 2024-02-02 RX ORDER — TESTOSTERONE CYPIONATE 1000 MG/10ML
0.1 INJECTION, SOLUTION INTRAMUSCULAR DAILY
COMMUNITY

## 2024-02-05 ENCOUNTER — TELEPHONE (OUTPATIENT)
Facility: LOCATION | Age: 59
End: 2024-02-05

## 2024-02-08 ENCOUNTER — TELEPHONE (OUTPATIENT)
Facility: LOCATION | Age: 59
End: 2024-02-08

## 2024-02-08 NOTE — TELEPHONE ENCOUNTER
Notification/prior authorization number  [id number]  Case status  Closed  Case status reason  Case was managed and is now complete  Primary care physician  ---  Advance notify date/time  02/05/2024, 12:09 PM CST  Admission notify date/time  ---  Coverage status    Overall coverage status  Covered/Approved  Coverage determination is reflected for the facility admission and isn't a guarantee of payment for ongoing services.    Procedure code  FAC  Description  [facility]  Coverage status  Covered/Approved  Decision date  02/06/2024  Procedure code  51600  Description  Laparoscopy, surgical; repair initial inguinal hernia  Coverage status  Covered/Approved  Decision date  02/06/2024    Start date  02/19/2024  End date  05/19/2024

## 2024-02-19 ENCOUNTER — ANESTHESIA (OUTPATIENT)
Dept: SURGERY | Facility: HOSPITAL | Age: 59
End: 2024-02-19
Payer: COMMERCIAL

## 2024-02-19 ENCOUNTER — ANESTHESIA EVENT (OUTPATIENT)
Dept: SURGERY | Facility: HOSPITAL | Age: 59
End: 2024-02-19
Payer: COMMERCIAL

## 2024-02-19 ENCOUNTER — HOSPITAL ENCOUNTER (OUTPATIENT)
Facility: HOSPITAL | Age: 59
Setting detail: HOSPITAL OUTPATIENT SURGERY
Discharge: HOME | End: 2024-02-19
Attending: SURGERY | Admitting: SURGERY
Payer: COMMERCIAL

## 2024-02-19 VITALS
HEART RATE: 75 BPM | SYSTOLIC BLOOD PRESSURE: 151 MMHG | TEMPERATURE: 98 F | OXYGEN SATURATION: 98 % | RESPIRATION RATE: 18 BRPM | DIASTOLIC BLOOD PRESSURE: 84 MMHG | HEIGHT: 73 IN | BODY MASS INDEX: 27.83 KG/M2 | WEIGHT: 210 LBS

## 2024-02-19 DIAGNOSIS — K40.30 INCARCERATED RIGHT INGUINAL HERNIA: Primary | ICD-10-CM

## 2024-02-19 PROCEDURE — 8E0W4CZ ROBOTIC ASSISTED PROCEDURE OF TRUNK REGION, PERCUTANEOUS ENDOSCOPIC APPROACH: ICD-10-PCS | Performed by: SURGERY

## 2024-02-19 PROCEDURE — 0YU54JZ SUPPLEMENT RIGHT INGUINAL REGION WITH SYNTHETIC SUBSTITUTE, PERCUTANEOUS ENDOSCOPIC APPROACH: ICD-10-PCS | Performed by: SURGERY

## 2024-02-19 PROCEDURE — 49650 LAP ING HERNIA REPAIR INIT: CPT

## 2024-02-19 PROCEDURE — 49650 LAP ING HERNIA REPAIR INIT: CPT | Performed by: SURGERY

## 2024-02-19 DEVICE — PROGRIP MESH: Type: IMPLANTABLE DEVICE | Site: INGUINAL | Status: FUNCTIONAL

## 2024-02-19 RX ORDER — HYDROMORPHONE HYDROCHLORIDE 1 MG/ML
0.6 INJECTION, SOLUTION INTRAMUSCULAR; INTRAVENOUS; SUBCUTANEOUS EVERY 5 MIN PRN
Status: DISCONTINUED | OUTPATIENT
Start: 2024-02-19 | End: 2024-02-19

## 2024-02-19 RX ORDER — DIPHENHYDRAMINE HYDROCHLORIDE 50 MG/ML
12.5 INJECTION INTRAMUSCULAR; INTRAVENOUS AS NEEDED
Status: DISCONTINUED | OUTPATIENT
Start: 2024-02-19 | End: 2024-02-19

## 2024-02-19 RX ORDER — LIDOCAINE HYDROCHLORIDE 10 MG/ML
INJECTION, SOLUTION EPIDURAL; INFILTRATION; INTRACAUDAL; PERINEURAL AS NEEDED
Status: DISCONTINUED | OUTPATIENT
Start: 2024-02-19 | End: 2024-02-19 | Stop reason: SURG

## 2024-02-19 RX ORDER — CEFAZOLIN SODIUM/WATER 2 G/20 ML
2 SYRINGE (ML) INTRAVENOUS ONCE
Status: COMPLETED | OUTPATIENT
Start: 2024-02-19 | End: 2024-02-19

## 2024-02-19 RX ORDER — SCOLOPAMINE TRANSDERMAL SYSTEM 1 MG/1
1 PATCH, EXTENDED RELEASE TRANSDERMAL ONCE
Status: DISCONTINUED | OUTPATIENT
Start: 2024-02-19 | End: 2024-02-19 | Stop reason: HOSPADM

## 2024-02-19 RX ORDER — ONDANSETRON 2 MG/ML
4 INJECTION INTRAMUSCULAR; INTRAVENOUS EVERY 6 HOURS PRN
Status: DISCONTINUED | OUTPATIENT
Start: 2024-02-19 | End: 2024-02-19

## 2024-02-19 RX ORDER — MEPERIDINE HYDROCHLORIDE 25 MG/ML
12.5 INJECTION INTRAMUSCULAR; INTRAVENOUS; SUBCUTANEOUS AS NEEDED
Status: DISCONTINUED | OUTPATIENT
Start: 2024-02-19 | End: 2024-02-19

## 2024-02-19 RX ORDER — KETOROLAC TROMETHAMINE 30 MG/ML
INJECTION, SOLUTION INTRAMUSCULAR; INTRAVENOUS AS NEEDED
Status: DISCONTINUED | OUTPATIENT
Start: 2024-02-19 | End: 2024-02-19 | Stop reason: SURG

## 2024-02-19 RX ORDER — HYDROCODONE BITARTRATE AND ACETAMINOPHEN 5; 325 MG/1; MG/1
1 TABLET ORAL ONCE AS NEEDED
Status: COMPLETED | OUTPATIENT
Start: 2024-02-19 | End: 2024-02-19

## 2024-02-19 RX ORDER — HYDROCODONE BITARTRATE AND ACETAMINOPHEN 5; 325 MG/1; MG/1
2 TABLET ORAL ONCE AS NEEDED
Status: COMPLETED | OUTPATIENT
Start: 2024-02-19 | End: 2024-02-19

## 2024-02-19 RX ORDER — SODIUM CHLORIDE, SODIUM LACTATE, POTASSIUM CHLORIDE, CALCIUM CHLORIDE 600; 310; 30; 20 MG/100ML; MG/100ML; MG/100ML; MG/100ML
INJECTION, SOLUTION INTRAVENOUS CONTINUOUS
Status: DISCONTINUED | OUTPATIENT
Start: 2024-02-19 | End: 2024-02-19

## 2024-02-19 RX ORDER — PROCHLORPERAZINE EDISYLATE 5 MG/ML
5 INJECTION INTRAMUSCULAR; INTRAVENOUS EVERY 8 HOURS PRN
Status: DISCONTINUED | OUTPATIENT
Start: 2024-02-19 | End: 2024-02-19

## 2024-02-19 RX ORDER — DEXAMETHASONE SODIUM PHOSPHATE 4 MG/ML
VIAL (ML) INJECTION AS NEEDED
Status: DISCONTINUED | OUTPATIENT
Start: 2024-02-19 | End: 2024-02-19 | Stop reason: SURG

## 2024-02-19 RX ORDER — DIPHENHYDRAMINE HYDROCHLORIDE 50 MG/ML
INJECTION INTRAMUSCULAR; INTRAVENOUS AS NEEDED
Status: DISCONTINUED | OUTPATIENT
Start: 2024-02-19 | End: 2024-02-19 | Stop reason: SURG

## 2024-02-19 RX ORDER — MIDAZOLAM HYDROCHLORIDE 1 MG/ML
INJECTION INTRAMUSCULAR; INTRAVENOUS AS NEEDED
Status: DISCONTINUED | OUTPATIENT
Start: 2024-02-19 | End: 2024-02-19 | Stop reason: SURG

## 2024-02-19 RX ORDER — BUPIVACAINE HYDROCHLORIDE 2.5 MG/ML
INJECTION, SOLUTION EPIDURAL; INFILTRATION; INTRACAUDAL AS NEEDED
Status: DISCONTINUED | OUTPATIENT
Start: 2024-02-19 | End: 2024-02-19 | Stop reason: HOSPADM

## 2024-02-19 RX ORDER — HYDROMORPHONE HYDROCHLORIDE 1 MG/ML
INJECTION, SOLUTION INTRAMUSCULAR; INTRAVENOUS; SUBCUTANEOUS
Status: COMPLETED
Start: 2024-02-19 | End: 2024-02-19

## 2024-02-19 RX ORDER — OXYCODONE HYDROCHLORIDE 5 MG/1
5 TABLET ORAL EVERY 6 HOURS PRN
Qty: 20 TABLET | Refills: 0 | Status: SHIPPED | OUTPATIENT
Start: 2024-02-19

## 2024-02-19 RX ORDER — ACETAMINOPHEN 500 MG
1000 TABLET ORAL ONCE
Status: DISCONTINUED | OUTPATIENT
Start: 2024-02-19 | End: 2024-02-19 | Stop reason: HOSPADM

## 2024-02-19 RX ORDER — NALOXONE HYDROCHLORIDE 0.4 MG/ML
0.08 INJECTION, SOLUTION INTRAMUSCULAR; INTRAVENOUS; SUBCUTANEOUS AS NEEDED
Status: DISCONTINUED | OUTPATIENT
Start: 2024-02-19 | End: 2024-02-19

## 2024-02-19 RX ORDER — HEPARIN SODIUM 5000 [USP'U]/ML
5000 INJECTION, SOLUTION INTRAVENOUS; SUBCUTANEOUS ONCE
Status: COMPLETED | OUTPATIENT
Start: 2024-02-19 | End: 2024-02-19

## 2024-02-19 RX ORDER — ONDANSETRON 2 MG/ML
INJECTION INTRAMUSCULAR; INTRAVENOUS AS NEEDED
Status: DISCONTINUED | OUTPATIENT
Start: 2024-02-19 | End: 2024-02-19 | Stop reason: SURG

## 2024-02-19 RX ORDER — ACETAMINOPHEN 500 MG
1000 TABLET ORAL ONCE AS NEEDED
Status: COMPLETED | OUTPATIENT
Start: 2024-02-19 | End: 2024-02-19

## 2024-02-19 RX ORDER — HYDROMORPHONE HYDROCHLORIDE 1 MG/ML
0.4 INJECTION, SOLUTION INTRAMUSCULAR; INTRAVENOUS; SUBCUTANEOUS EVERY 5 MIN PRN
Status: DISCONTINUED | OUTPATIENT
Start: 2024-02-19 | End: 2024-02-19

## 2024-02-19 RX ORDER — SENNA AND DOCUSATE SODIUM 50; 8.6 MG/1; MG/1
1 TABLET, FILM COATED ORAL DAILY
Qty: 30 TABLET | Refills: 0 | Status: SHIPPED | OUTPATIENT
Start: 2024-02-19

## 2024-02-19 RX ORDER — HYDROMORPHONE HYDROCHLORIDE 1 MG/ML
0.2 INJECTION, SOLUTION INTRAMUSCULAR; INTRAVENOUS; SUBCUTANEOUS EVERY 5 MIN PRN
Status: DISCONTINUED | OUTPATIENT
Start: 2024-02-19 | End: 2024-02-19

## 2024-02-19 RX ORDER — MIDAZOLAM HYDROCHLORIDE 1 MG/ML
1 INJECTION INTRAMUSCULAR; INTRAVENOUS EVERY 5 MIN PRN
Status: DISCONTINUED | OUTPATIENT
Start: 2024-02-19 | End: 2024-02-19

## 2024-02-19 RX ORDER — ROCURONIUM BROMIDE 10 MG/ML
INJECTION, SOLUTION INTRAVENOUS AS NEEDED
Status: DISCONTINUED | OUTPATIENT
Start: 2024-02-19 | End: 2024-02-19 | Stop reason: SURG

## 2024-02-19 RX ADMIN — DIPHENHYDRAMINE HYDROCHLORIDE 12.5 MG: 50 INJECTION INTRAMUSCULAR; INTRAVENOUS at 17:15:00

## 2024-02-19 RX ADMIN — KETOROLAC TROMETHAMINE 30 MG: 30 INJECTION, SOLUTION INTRAMUSCULAR; INTRAVENOUS at 18:05:00

## 2024-02-19 RX ADMIN — ONDANSETRON 4 MG: 2 INJECTION INTRAMUSCULAR; INTRAVENOUS at 18:05:00

## 2024-02-19 RX ADMIN — MIDAZOLAM HYDROCHLORIDE 2 MG: 1 INJECTION INTRAMUSCULAR; INTRAVENOUS at 17:12:00

## 2024-02-19 RX ADMIN — SODIUM CHLORIDE, SODIUM LACTATE, POTASSIUM CHLORIDE, CALCIUM CHLORIDE: 600; 310; 30; 20 INJECTION, SOLUTION INTRAVENOUS at 17:26:00

## 2024-02-19 RX ADMIN — DEXAMETHASONE SODIUM PHOSPHATE 4 MG: 4 MG/ML VIAL (ML) INJECTION at 17:15:00

## 2024-02-19 RX ADMIN — LIDOCAINE HYDROCHLORIDE 50 MG: 10 INJECTION, SOLUTION EPIDURAL; INFILTRATION; INTRACAUDAL; PERINEURAL at 16:57:00

## 2024-02-19 RX ADMIN — ROCURONIUM BROMIDE 50 MG: 10 INJECTION, SOLUTION INTRAVENOUS at 16:57:00

## 2024-02-19 RX ADMIN — CEFAZOLIN SODIUM/WATER 2 G: 2 G/20 ML SYRINGE (ML) INTRAVENOUS at 17:08:00

## 2024-02-19 NOTE — ANESTHESIA PROCEDURE NOTES
Airway  Date/Time: 2/19/2024 5:04 PM  Urgency: elective      General Information and Staff    Patient location during procedure: OR  Anesthesiologist: Bert Ramsey MD  Performed: anesthesiologist   Performed by: Bert Ramsey MD  Authorized by: Bert Ramsey MD      Indications and Patient Condition  Indications for airway management: anesthesia  Sedation level: deep  Preoxygenated: yes  Patient position: sniffing  Mask difficulty assessment: 3 - difficult mask (inadequate, unstable or two providers) +/- NMBA    Final Airway Details  Final airway type: endotracheal airway      Successful airway: ETT  Cuffed: yes   Successful intubation technique: direct laryngoscopy  Endotracheal tube insertion site: oral  Blade: GlideScope  Blade size: #3  ETT size (mm): 7.5    Placement verified by: capnometry   Measured from: lips  ETT to lips (cm): 23  Number of attempts at approach: 1  Number of other approaches attempted: 1    Other Attempts  Unsuccessful attempted endotracheal techniques: direct laryngoscopy

## 2024-02-19 NOTE — DISCHARGE INSTRUCTIONS
Home Care Instructions  Adult Inguinal Hernia  Dr Tello Thompson    MEDICATIONS  For post-operative pain control the medications are usually Vicodin or Norco. These are narcotics and are best taken by starting with one tablet and repeating every four to six hours as needed. If the patient does not feel they need the narcotics they shouldn’t take them. If the pain is severe the patient may take up to two pills every four hours. If a minor supplement is necessary in addition to the narcotics do not overlap with Tylenol (any product with acetaminophen) as both Vicodin and Norco contain Tylenol. Please supplement with Advil (ibuprofen) or Motrin. Please ask your surgeon before resuming blood thinners such as aspirin, plavix or coumadin. All other home medications may be resumed as scheduled.    DIET  The patient may resume a general diet immediately. This is not a good time to eat excessively. The patient should eat in moderation and stick with foods the patient feels are easy to digest. There should be no alcohol consumption in the immediate recover time period or within six hours of taking narcotics.    WOUND CARE  The top dressing may be removed the day after surgery. This includes the gauze, tape and band-aids if they are present. Do not remove the steri-strips or butterfly tapes that are white and adherent to the skin. The steri-strips will eventually peel up at the ends and at this point they may be removed. This is usually seven to ten days after surgery. The patient may shower the day after surgery. There is no need to cover the incisions and all top gauze type dressing should be removed prior to showering. Soap can get on the wounds but do not scrub over the wounds. No hair dye or chemicals of any kind should get in the wounds. Avoid tub baths for two weeks. If visible sutures or staples are present they will be removed in the office by the surgeon or nurse. Most wounds will be closed with dissolving suture  underneath the skin. These sutures will dissolve on their own.    ACTIVITY  Every day the patient should be up, showered and dressed. Each day the patient should be up and around the house. The patient should not lie in bed and should not stay in pajamas. We count on the patient being up, coughing, walking and deep breathing to avoid pneumonia and blood clots in the legs. Once a day the patient should get out of the house and go shopping, go to the mall, the [employer] store, the movies or a restaurant. The patient may ride in a car but should not drive the car for at least one week. Patients should be off narcotics for at least 8 hours prior to being a . The average time off work is 10 to 14 days; most adults will be seeing the surgeon prior to returning to work. Patients may go up and down stairs and lift up to five pounds but no bending, pushing or pulling. Nothing called work or exercise until the follow up visit. No ‘stair-master’ power walking, jogging or workout until the follow up visit. Patients should seek further activity limits at the time of their appointment.    APPOINTMENT  Please call our office today for an appointment within five to ten days of discharge Any fever greater than 100.5, chills, nausea, vomiting, or severe diarrhea please call our office. If the wound turns red, hot, swollen, becomes increasingly painful, or drains pus call us immediately at [phone]. For life threatening emergencies call 911. For non-emergent care please call our office after 9:30 a.m. Monday through Saturday. The number listed above is our office number, our phone automatically switches to out answering service if we are not there. Please do not use the emergency room for non-urgent care.      Thank you for entrusting us with your care.  [facility]    You received a drug called Toradol which is an Anti Inflammatory at: 6pm  If you are allowed to take Anti inflammatories:    Do not take  any Anti Inflammatory like Motrin, Aleve or Ibuprofen until after: 12a  Please report any suspected allergic reactions or bleeding issues to your doctor

## 2024-02-19 NOTE — ANESTHESIA PREPROCEDURE EVALUATION
PRE-OP EVALUATION    Patient Name: Jackson Melgar    Admit Diagnosis: Reducible right inguinal hernia [K40.90]    Pre-op Diagnosis: Reducible right inguinal hernia [K40.90]    ROBOTIC LAPAROSCOPIC RIGHT INGUINAL HERNIA REPAIR WITH MESH    Anesthesia Procedure: ROBOTIC LAPAROSCOPIC RIGHT INGUINAL HERNIA REPAIR WITH MESH (Right)    Surgeon(s) and Role:     * Tello Thompson MD - Primary    Pre-op vitals reviewed.  Temp: 98 °F (36.7 °C)  Pulse: 56  Resp: 16  BP: 144/72  SpO2: 96 %  Body mass index is 27.71 kg/m².    Current medications reviewed.  Hospital Medications:  • [MAR Hold] acetaminophen (Tylenol Extra Strength) tab 1,000 mg  1,000 mg Oral Once   • [MAR Hold] scopolamine (Transderm-Scop) 1 MG/3DAYS patch 1 patch  1 patch Transdermal Once   • lactated ringers infusion   Intravenous Continuous   • [COMPLETED] heparin (Porcine) 5000 UNIT/ML injection 5,000 Units  5,000 Units Subcutaneous Once   • ceFAZolin (Ancef) 2 g in 20mL IV syringe premix  2 g Intravenous Once       Outpatient Medications:     Medications Prior to Admission   Medication Sig Dispense Refill Last Dose   • Testosterone Cypionate 100 MG/ML Intramuscular Solution Inject 0.1 mg into the muscle daily.   2/19/2024   • tamsulosin 0.4 MG Oral Cap Take 1 capsule (0.4 mg total) by mouth daily. 90 capsule 0 2/18/2024 at 0900   • zonisamide 50 MG Oral Cap Take 1 capsule (50 mg total) by mouth daily. 90 capsule 3 2/18/2024 at 0900   • FOLIC ACID 1 MG Oral Tab TAKE ONE TABLET BY MOUTH ONE TIME DAILY 90 tablet 0 2/18/2024 at 0900   • methotrexate 2.5 MG Oral Tab Take 7 tablets (17.5 mg total) by mouth every 7 days. (Patient taking differently: Take 5 tablets (12.5 mg total) by mouth every 7 days. Mondays) 135 tablet 1 2/12/2024   • Adalimumab (HUMIRA PEN) 40 MG/0.4ML Subcutaneous Pen-injector Kit Inject 40 mg into the skin every 14 (fourteen) days. 6 each 3 2/12/2024   • Lansoprazole 15 MG Oral Tablet Delayed Release Dispersible Take 1 tablet (15 mg total)  by mouth every morning before breakfast.   2/18/2024   • finasteride 1 MG Oral Tab Take 1 tablet (1 mg total) by mouth daily.   2/18/2024 at 0900   • Multiple Vitamin (MULTI-DAY VITAMINS) Oral Tab Take 1 tablet by mouth daily.   2/18/2024 at 0900   • Cetirizine HCl 5 MG Oral Tab Take 1 tablet (5 mg total) by mouth daily.   Past Week   • Omega-3 Fatty Acids (FISH OIL OR) Take 1 capsule by mouth daily. 1 qd   2/5/2024   • Cholecalciferol (VITAMIN D) 2000 UNITS Oral Cap Take 1 capsule (2,000 Units total) by mouth daily.   2/18/2024   • Tadalafil 20 MG Oral Tab Take 1 tablet (20 mg total) by mouth daily as needed for Erectile Dysfunction. 30 tablet 3 Unknown       Allergies: Patient has no known allergies.      Anesthesia Evaluation    Patient summary reviewed.    Anesthetic Complications  (-) history of anesthetic complications         GI/Hepatic/Renal    Negative GI/hepatic/renal ROS.                             Cardiovascular    Negative cardiovascular ROS.  ECG reviewed.  Exercise tolerance: good     MET: >4                                           Endo/Other                           (+) arthritis       Pulmonary    Negative pulmonary ROS.                       Neuro/Psych                                  Past Surgical History:   Procedure Laterality Date   • BIOPSY OF THYROID,PERCUT  2009   • COLONOSCOPY  2009    complete   • COLONOSCOPY  2014,2010    x2 Dr. Cast    • FOREARM/WRIST SURGERY UNLISTED Bilateral left-4/2017 and right-2011--Dr. Landeros    wrist ganglion cyst excision   • HEMORRHOIDECTOMY     • OTHER SURGICAL HISTORY  2004    Upper Gastrointestinal Endoscopy   • TONSILLECTOMY  10/2018     Social History     Socioeconomic History   • Marital status:    Tobacco Use   • Smoking status: Never   • Smokeless tobacco: Never   Vaping Use   • Vaping Use: Never used   Substance and Sexual Activity   • Alcohol use: Yes     Comment: a few times per week   • Drug use: No   Other Topics Concern   • Caffeine  Concern Yes     Comment: 1 cup every other day   • Exercise Yes     Comment: 3-4x a week     History   Drug Use No     Available pre-op labs reviewed.               Airway      Mallampati: II  Mouth opening: >3 FB  TM distance: > 6 cm  Neck ROM: full Cardiovascular    Cardiovascular exam normal.  Rhythm: regular  Rate: normal     Dental             Pulmonary    Pulmonary exam normal.  Breath sounds clear to auscultation bilaterally.               Other findings        ASA: 2   Plan: general  NPO status verified and patient meets guidelines.    Post-procedure pain management plan discussed with surgeon and patient.      Plan/risks discussed with: patient            Present on Admission:  **None**

## 2024-02-19 NOTE — H&P
New Patient Visit Note         History of Present Illness     The patient presents at the request of his primary care physician for evaluation of a right inguinal hernia.  Patient states that while lifting weights several weeks ago he noticed a bulge with associated pain in the right groin.  The bulge comes and goes; the bulge is more prominent with exertion.  The patient has pain in the inguinal region that occasionally radiates into the right hemiscrotum.  As part of the workup, primary care ordered an ultrasound, however, the ultrasound did not confirm or reveal an inguinal hernia.  I reviewed the images.    The patient denies fever, chills, chest pain, shortness of breath, dyspnea. The patient also denies hematemesis, melena, or hematochezia. The patient denies change in bowel or bladder habits. There is no complaint of hematuria or dysuria.    I discussed the risk, benefits, alternatives of robotic right inguinal hernia repair with mesh.  We discussed the anticipated postoperative recovery including dietary, activity, exercise and lifestyle recommendations.  Patient's questions were answered to his understanding.    Allergies  Jackson has No Known Allergies.    Past Medical / Surgical / Social / Family History    The past medical and past surgical history have been reviewed by me today.    Past Medical History:   Diagnosis Date    Arthritis     Psoriasis     Psoriatic arthritis (HCC)     Tremor     Visual impairment     glasses     Past Surgical History:   Procedure Laterality Date    BIOPSY OF THYROID,PERCUT  2009    COLONOSCOPY  2009    complete    COLONOSCOPY  2014,2010    x2 Dr. Cast     FOREARM/WRIST SURGERY UNLISTED Bilateral left-4/2017 and right-2011--Dr. Landeros    wrist ganglion cyst excision    HEMORRHOIDECTOMY      OTHER SURGICAL HISTORY  2004    Upper Gastrointestinal Endoscopy    TONSILLECTOMY  10/2018       The family history and social history have been reviewed by me today.    History reviewed. No  pertinent family history.  Social History     Socioeconomic History    Marital status:    Tobacco Use    Smoking status: Never    Smokeless tobacco: Never   Vaping Use    Vaping Use: Never used   Substance and Sexual Activity    Alcohol use: Yes     Comment: a few times per week    Drug use: No   Other Topics Concern    Caffeine Concern Yes     Comment: 1 cup every other day    Exercise Yes     Comment: 3-4x a week      No current outpatient medications on file.      Review of Systems  The Review of Systems has been reviewed by me during today.  Review of Systems   Constitutional:  Negative for chills, diaphoresis, fatigue, fever and unexpected weight change.   HENT:  Negative for hearing loss, nosebleeds, sore throat and trouble swallowing.    Respiratory:  Negative for apnea, cough, shortness of breath and wheezing.    Cardiovascular:  Negative for chest pain, palpitations and leg swelling.   Gastrointestinal:  Negative for abdominal distention, abdominal pain, anal bleeding, blood in stool, constipation, diarrhea, nausea and vomiting.   Genitourinary:  Negative for difficulty urinating, dysuria, frequency and urgency.   Musculoskeletal:  Negative for arthralgias and myalgias.   Skin:  Negative for color change and rash.   Neurological:  Negative for tremors, syncope and weakness.   Hematological:  Negative for adenopathy. Does not bruise/bleed easily.   Psychiatric/Behavioral:  Negative for behavioral problems and sleep disturbance.        Physical Findings   /72 (BP Location: Right arm)   Pulse 56   Temp 98 °F (36.7 °C) (Temporal)   Resp 16   Ht 73\"   Wt 210 lb (95.3 kg)   SpO2 96%   BMI 27.71 kg/m²   Physical Exam  Vitals and nursing note reviewed.   Constitutional:       General: He is not in acute distress.     Appearance: He is well-developed.   HENT:      Head: Normocephalic and atraumatic.   Eyes:      General: No scleral icterus.     Pupils: Pupils are equal, round, and reactive to  light.   Neck:      Vascular: No JVD.      Trachea: No tracheal deviation.   Cardiovascular:      Rate and Rhythm: Normal rate and regular rhythm.   Pulmonary:      Effort: Pulmonary effort is normal. No respiratory distress.      Breath sounds: No stridor.   Abdominal:      General: Bowel sounds are normal. There is no distension.      Palpations: Abdomen is soft. Abdomen is not rigid. There is no mass.      Tenderness: There is no abdominal tenderness. There is no guarding or rebound. Negative signs include García's sign and McBurney's sign.      Hernia: A hernia is present. Hernia is present in the right inguinal area. There is no hernia in the left inguinal area.   Genitourinary:     Penis: Normal and circumcised. No phimosis, paraphimosis, hypospadias, erythema, tenderness, discharge, swelling or lesions.       Testes: Normal.         Right: Mass, tenderness, swelling, testicular hydrocele or varicocele not present. Right testis is descended. Cremasteric reflex is present.          Left: Mass, tenderness, swelling, testicular hydrocele or varicocele not present. Left testis is descended. Cremasteric reflex is present.       Epididymis:      Right: Normal.      Left: Normal.       Musculoskeletal:         General: Normal range of motion.      Cervical back: Normal range of motion and neck supple.   Skin:     General: Skin is warm and dry.   Neurological:      Mental Status: He is alert and oriented to person, place, and time.   Psychiatric:         Behavior: Behavior normal.             Assessment     Right inguinal hernia        Plan     The patient will be scheduled for robotic right inguinal hernia repair with mesh.    The lupsi-operative care plan was discussed with the patient, who voices understanding.  Activity and lifting recommendations were discussed in length.     The risks, benefits, and alternatives to the procedure were explained to the patient.  The risks explained include, but are not limited to,  bleeding, infection, pain wound complications, recurrence, incorrect diagnosis, injury to adjacent organs and structures. We also discussed the possibile need for further therapeutic, diagnostic, or surgical intervention.  The patient voiced understanding, and after all questions were answered to the patient's satisfaction, the patient provided willing and informed consent to proceed.     Tello Thompson MD

## 2024-02-20 NOTE — OPERATIVE REPORT
OPERATIVE REPORT    PREOPERATIVE DIAGNOSIS:  Right incarcerated inguinal hernia.  POSTOPERATIVE DIAGNOSIS:  Right indirect incarcerated inguinal hernia.  PROCEDURE PERFORMED: Robotic right incarcerated inguinal hernia repair with mesh ( ProGrip mesh used)      ASSISTANT: Ms Lindsey PA-C    ANESTHESIA: General endotracheal anesthesia.     Anesthesiologist.: Bert Ramsey MD    BLOOD LOSS: Less than 5 mL.    COMPLICATIONS: None apparent.    IMPLANTS: Mesh as above.    FINDINGS: Right indirect incarcerated inguinal hernia.    COMPLICATIONS: None apparent.    DISPOSITION: The patient was awakened from anesthesia and brought to the recovery room in stable condition. He tolerated the procedure without apparent complication. Needle, sponge, instrument counts were correct at the end of procedure. Please note preprocedure antibiotic and DVT prophylaxis administered per protocol and a time-out were performed prior to initiating the procedure identifying the correct patient, procedure, surgeon, and sites of surgery. I indicated the sites of surgery in the preoperative holding area and the patient concurred.    INDICATIONS: Please see the preprocedure history and physical. Briefly, the patient is a  58 year old male with a painful bulge of the inguinal region. Physical examination is consistent with inguinal hernia. The risks, benefits, and alternatives of robotic inguinal hernia repair were explained to the patient.  The risks explained included but were not limited to bleeding, infection, recurrence, injury to adjacent organs and structures, injury to the vas deferens, injury to the blood supply of the testicle with potential for testicular ischemia or atrophy. The need for therapeutic, diagnostic for surgical intervention was also discussed with the patient. The patient voiced understanding. All pertinent questions were answered to the patient's satisfaction after which the patient provided willing and informed  consent to proceed with surgery.    PROCEDURE: The patient was brought to the operating room, and after induction of general endotracheal anesthesia, the anesthesiologist performed an TAP block.  Next, the abdomen was prepped and draped in usual sterile fashion.  The patient was placed in Trendelenburg position. The umbilicus was grasped, elevated with an Allis clamp and 2 perforating towel clips, and an 8-mm incision was created cephalad to the umbilicus. Through this a Veress needle was introduced into the abdomen and pneumoperitoneum was established in usual manner. An 8-mm DaVinci Visi-Port trocar was then placed under direct vision followed by a laparoscope. Diagnostic survey of the abdomen revealed no other acute pathology or iatrogenic injury. Attention was turned to the inguinal region where the hernia defect was identified. At this point, two 8 mm trocars were placed on either side of the abdomen in the midclavicular line under direct vision. Next, the robot was then docked and instruments placed under direct vision. Repair of the hernia was initiated. The peritoneum was grasped, retracted, and divided . Once the peritoneum was incised, blunt and sharp dissection was used with judicious use of electrocautery to achieve hemostasis. Medial to lateral dissection was accomplished identifying initially the pubic tubercle and Jacob's ligament and the soft tissues were dissected laterally to create a sufficient pocket to accommodate mesh. Next, the hernia sac was grasped and retracted. Blunt and sharp dissection technique was utilized to reduce the hernia sac into the abdominal cavity. Once this was accomplished, a ProGrip  mesh was placed in the inguinal region. Once the mesh was in place, the peritoneal flap was then reapproximated in anatomic position and secured using running 2-0 V-Loc suture. At the completion of the operation, all instruments were removed. Needle, sponge, instrument counts were correct.  Pneumoperitoneum was released and all instruments had been removed under direct vision as well.  The skin incisions are closed with 4-0 Monocryl suture. Dermabond was used to seal the incisions. The anesthesiologist performed a TAP block. The patient was awakened from anesthesia, brought to the recovery room in stable condition having tolerated procedure without apparent complication. Needle, sponge, instrument counts correct at the end of procedure and operative findings were discussed with the patient's family.        Tello Thompson MD FACS  Trauma Medical Director, [facility]    The 21st Century Cures Act makes medical notes like these available to patients in the interest of transparency. Please be advised this is a medical document. Medical documents are intended to carry relevant information, facts as evident, and the clinical opinion of the practitioner. The medical note is intended as peer to peer communication and may appear blunt or direct. It is written in medical language and may contain abbreviations or verbiage that are unfamiliar.    This note was prepared using Dragon Medical voice recognition dictation software. As a result, errors may occur. When identified, these errors have been corrected. While every attempt is made to correct errors during dictation, discrepancies may still exist.

## 2024-02-20 NOTE — ANESTHESIA POSTPROCEDURE EVALUATION
[facility]    Jackson Melgar Patient Status:  Hospital Outpatient Surgery   Age/Gender 58 year old male MRN [id number]   Location [facility] SURGERY Attending Tello Thompson MD   Hosp Day # 0 PCP Carissa Boyd MD       Anesthesia Post-op Note    ROBOTIC LAPAROSCOPIC RIGHT INGUINAL HERNIA REPAIR WITH MESH    Procedure Summary       Date: 02/19/24 Room / Location:  MAIN OR 08 /  MAIN OR    Anesthesia Start: 1653 Anesthesia Stop: 1822    Procedure: ROBOTIC LAPAROSCOPIC RIGHT INGUINAL HERNIA REPAIR WITH MESH (Right: Abdomen) Diagnosis:       Reducible right inguinal hernia      (Reducible right inguinal hernia [K40.90])    Surgeons: Tello Thompson MD Anesthesiologist: Bert Ramsey MD    Anesthesia Type: general ASA Status: 2            Anesthesia Type: general    Vitals Value Taken Time   /73 02/19/24 1824   Temp 97.9 02/19/24 1824   Pulse 70 02/19/24 1824   Resp 16 02/19/24 1824   SpO2 100 02/19/24 1824       Patient Location: PACU    Anesthesia Type: general    Airway Patency: extubated    Postop Pain Control: adequate    Mental Status: mildly sedated but able to meaningfully participate in the post-anesthesia evaluation    Nausea/Vomiting: none    Cardiopulmonary/Hydration status: stable euvolemic    Complications: no apparent anesthesia related complications    Postop vital signs: stable    Dental Exam: Unchanged from Preop    Patient to be discharged from PACU when criteria met.

## 2024-02-20 NOTE — BRIEF OP NOTE
Pre-Operative Diagnosis:  right inguinal hernia      Post-Operative Diagnosis: right inguinal hernia       Procedure Performed:   ROBOTIC LAPAROSCOPIC RIGHT INGUINAL HERNIA REPAIR WITH MESH    Surgeon(s) and Role:     * Tello Thompson MD - Primary    Assistant(s):  PA: Payal Portillo PA     Surgical Findings: moderate-sized incarcerated right indirect inguinal hernia      Specimen: none     Estimated Blood Loss: Blood Output: 2 mL (2/19/2024  6:03 PM)      Dictation Number:      Tello Thompson MD  2/19/2024  6:04 PM

## 2024-03-07 ENCOUNTER — OFFICE VISIT (OUTPATIENT)
Facility: LOCATION | Age: 59
End: 2024-03-07
Payer: COMMERCIAL

## 2024-03-07 VITALS — HEART RATE: 63 BPM | TEMPERATURE: 97 F

## 2024-03-07 DIAGNOSIS — Z98.890 POSTOPERATIVE STATE: Primary | ICD-10-CM

## 2024-03-07 PROCEDURE — 99024 POSTOP FOLLOW-UP VISIT: CPT

## 2024-03-07 RX ORDER — SENNA AND DOCUSATE SODIUM 50; 8.6 MG/1; MG/1
1 TABLET, FILM COATED ORAL DAILY
Qty: 30 TABLET | Refills: 0 | Status: SHIPPED | OUTPATIENT
Start: 2024-03-07 | End: 2024-04-06

## 2024-03-18 NOTE — PROGRESS NOTES
Post Operative Visit Note       Active Problems  1. Postoperative state         Chief Complaint   Chief Complaint   Patient presents with    Post-Op     P/O-2/19 ROBOTIC LAPAROSCOPIC RIGHT INGUINAL HERNIA REPAIR WITH MESH.            History of Present Illness     Jackson Melgar is a 58-year-old male who presents to clinic for continued care and evaluation following robotic assisted right inguinal hernia repair with mesh on 2/19/2024 with Dr. Thompson.    The patient reports doing well postoperatively.  He is experiencing mild discomfort in his right groin.  He denies nausea, vomiting, fever or chills.  He reports constipation for 1 week after surgery. He did take senna- docusate as prescribed which aided in bowel function. He does have a history of hemorrhoids and noticed some rectal pain and irritation due to the constipation. His hemorrhoids are managed with hydrocortisone cream prescribed by his PCP. He did obtain a refill of the cream and reports improvement over the past week. He would like a refill of senna-docusate as well.      Allergies  Jackson has No Known Allergies.    Past Medical / Surgical / Social / Family History    The past medical and past surgical history have been reviewed by me today.     Past Medical History:   Diagnosis Date    Arthritis     Psoriasis     Psoriatic arthritis (HCC)     Tremor     Visual impairment     glasses     Past Surgical History:   Procedure Laterality Date    BIOPSY OF THYROID,PERCUT  2009    COLONOSCOPY  2009    complete    COLONOSCOPY  2014,2010    x2 Dr. Cast     FOREARM/WRIST SURGERY UNLISTED Bilateral left-4/2017 and right-2011--Dr. Landeros    wrist ganglion cyst excision    HEMORRHOIDECTOMY      OTHER SURGICAL HISTORY  2004    Upper Gastrointestinal Endoscopy    TONSILLECTOMY  10/2018       The family history and social history have been reviewed by me today.    No family history on file.  Social History     Socioeconomic History    Marital status:    Tobacco  Use    Smoking status: Never    Smokeless tobacco: Never   Vaping Use    Vaping Use: Never used   Substance and Sexual Activity    Alcohol use: Yes     Comment: a few times per week    Drug use: No   Other Topics Concern    Caffeine Concern Yes     Comment: 1 cup every other day    Exercise Yes     Comment: 3-4x a week        Current Outpatient Medications:     senna-docusate 8.6-50 MG Oral Tab, Take 1 tablet by mouth daily., Disp: 30 tablet, Rfl: 0    hydrocortisone (PROCTO-MED HC) 2.5 % External Cream, Place 1 Application rectally 2 (two) times daily as needed for Hemorrhoids., Disp: 28 g, Rfl: 1    oxyCODONE 5 MG Oral Tab, Take 1 tablet (5 mg total) by mouth every 6 (six) hours as needed for Pain., Disp: 20 tablet, Rfl: 0    senna-docusate 8.6-50 MG Oral Tab, Take 1 tablet by mouth daily., Disp: 30 tablet, Rfl: 0    Testosterone Cypionate 100 MG/ML Intramuscular Solution, Inject 0.1 mg into the muscle daily., Disp: , Rfl:     tamsulosin 0.4 MG Oral Cap, Take 1 capsule (0.4 mg total) by mouth daily., Disp: 90 capsule, Rfl: 0    Tadalafil 20 MG Oral Tab, Take 1 tablet (20 mg total) by mouth daily as needed for Erectile Dysfunction., Disp: 30 tablet, Rfl: 3    zonisamide 50 MG Oral Cap, Take 1 capsule (50 mg total) by mouth daily., Disp: 90 capsule, Rfl: 3    FOLIC ACID 1 MG Oral Tab, TAKE ONE TABLET BY MOUTH ONE TIME DAILY, Disp: 90 tablet, Rfl: 0    methotrexate 2.5 MG Oral Tab, Take 7 tablets (17.5 mg total) by mouth every 7 days., Disp: 135 tablet, Rfl: 1    Adalimumab (HUMIRA PEN) 40 MG/0.4ML Subcutaneous Pen-injector Kit, Inject 40 mg into the skin every 14 (fourteen) days., Disp: 6 each, Rfl: 3    Lansoprazole 15 MG Oral Tablet Delayed Release Dispersible, Take 1 tablet (15 mg total) by mouth every morning before breakfast., Disp: , Rfl:     finasteride 1 MG Oral Tab, Take 1 tablet (1 mg total) by mouth daily., Disp: , Rfl:     Multiple Vitamin (MULTI-DAY VITAMINS) Oral Tab, Take 1 tablet by mouth daily.,  Disp: , Rfl:     Cetirizine HCl 5 MG Oral Tab, Take 1 tablet (5 mg total) by mouth daily., Disp: , Rfl:     Omega-3 Fatty Acids (FISH OIL OR), Take 1 capsule by mouth daily. 1 qd, Disp: , Rfl:     Cholecalciferol (VITAMIN D) 2000 UNITS Oral Cap, Take 1 capsule (2,000 Units total) by mouth daily., Disp: , Rfl:       Review of Systems  The Review of Systems has been reviewed by me during today.  Review of Systems   Constitutional:  Negative for chills, diaphoresis, fatigue, fever and unexpected weight change.   HENT:  Negative for hearing loss, nosebleeds, sore throat and trouble swallowing.    Respiratory:  Negative for apnea, cough, shortness of breath and wheezing.    Cardiovascular:  Negative for chest pain, palpitations and leg swelling.   Gastrointestinal:  Negative for abdominal distention, abdominal pain, anal bleeding, blood in stool, constipation, diarrhea, nausea and vomiting.   Genitourinary:  Negative for difficulty urinating, dysuria, frequency and urgency.   Musculoskeletal:  Negative for arthralgias and myalgias.   Skin:  Negative for color change and rash.   Neurological:  Negative for tremors, syncope and weakness.   Hematological:  Negative for adenopathy. Does not bruise/bleed easily.   Psychiatric/Behavioral:  Negative for behavioral problems and sleep disturbance.        Physical Findings   Pulse 63   Temp 97.1 °F (36.2 °C)   Physical Exam  Constitutional:       Appearance: Normal appearance.   HENT:      Head: Normocephalic and atraumatic.   Eyes:      Extraocular Movements: Extraocular movements intact.      Pupils: Pupils are equal, round, and reactive to light.   Pulmonary:      Effort: Pulmonary effort is normal. No respiratory distress.   Abdominal:      General: Abdomen is flat. Bowel sounds are normal. There is no distension.      Palpations: Abdomen is soft. There is no mass.      Tenderness: There is no abdominal tenderness. There is no guarding or rebound.          Comments: Incision  sites are dry, pink, and intact.  No surrounding erythema or drainage.  No signs of infection.   Musculoskeletal:         General: Normal range of motion.      Cervical back: Normal range of motion.   Skin:     General: Skin is warm.      Coloration: Skin is not jaundiced or pale.      Findings: No erythema.   Neurological:      General: No focal deficit present.      Mental Status: He is alert and oriented to person, place, and time.           Assessment   1. Postoperative state        Plan   The patient continues to do well postoperatively.  Continue p.o. Tylenol and Motrin as needed for pain control.  A refill of senna-docusate was sent to the patient's pharmacy.  Continue to wash incision sites with soap and water daily.  He is to maintain a 20 pound lifting restriction until 6 weeks after surgery.  A return to work note was not provided during today's visit.  He is to follow-up as needed.  All the patient's question concerns were addressed.  He expresses understanding and is in agreement with this plan.       No orders of the defined types were placed in this encounter.      Imaging & Referrals   None    Follow Up  No follow-ups on file.    Payal Portillo PA-C   minutes on the discharge service.

## 2024-04-11 ENCOUNTER — OFFICE VISIT (OUTPATIENT)
Dept: FAMILY MEDICINE CLINIC | Facility: CLINIC | Age: 59
End: 2024-04-11
Payer: COMMERCIAL

## 2024-04-11 VITALS
HEIGHT: 72.84 IN | WEIGHT: 213.69 LBS | BODY MASS INDEX: 28.32 KG/M2 | RESPIRATION RATE: 16 BRPM | SYSTOLIC BLOOD PRESSURE: 120 MMHG | TEMPERATURE: 98 F | HEART RATE: 72 BPM | DIASTOLIC BLOOD PRESSURE: 80 MMHG

## 2024-04-11 DIAGNOSIS — Z12.11 SCREENING FOR COLON CANCER: ICD-10-CM

## 2024-04-11 DIAGNOSIS — N40.1 BENIGN PROSTATIC HYPERPLASIA WITH NOCTURIA: ICD-10-CM

## 2024-04-11 DIAGNOSIS — R35.1 NOCTURIA MORE THAN TWICE PER NIGHT: ICD-10-CM

## 2024-04-11 DIAGNOSIS — Z13.220 ENCOUNTER FOR LIPID SCREENING FOR CARDIOVASCULAR DISEASE: ICD-10-CM

## 2024-04-11 DIAGNOSIS — Z13.228 SCREENING FOR ENDOCRINE, METABOLIC AND IMMUNITY DISORDER: ICD-10-CM

## 2024-04-11 DIAGNOSIS — Z13.29 SCREENING FOR ENDOCRINE, METABOLIC AND IMMUNITY DISORDER: ICD-10-CM

## 2024-04-11 DIAGNOSIS — Z13.0 SCREENING FOR ENDOCRINE, METABOLIC AND IMMUNITY DISORDER: ICD-10-CM

## 2024-04-11 DIAGNOSIS — R35.1 BENIGN PROSTATIC HYPERPLASIA WITH NOCTURIA: ICD-10-CM

## 2024-04-11 DIAGNOSIS — Z00.00 WELLNESS EXAMINATION: Primary | ICD-10-CM

## 2024-04-11 DIAGNOSIS — Z12.5 SCREENING FOR PROSTATE CANCER: ICD-10-CM

## 2024-04-11 DIAGNOSIS — R71.8 ELEVATED RED BLOOD CELL COUNT: ICD-10-CM

## 2024-04-11 DIAGNOSIS — Z13.89 SCREENING FOR GENITOURINARY CONDITION: ICD-10-CM

## 2024-04-11 DIAGNOSIS — N52.8 OTHER MALE ERECTILE DYSFUNCTION: ICD-10-CM

## 2024-04-11 DIAGNOSIS — Z13.6 ENCOUNTER FOR LIPID SCREENING FOR CARDIOVASCULAR DISEASE: ICD-10-CM

## 2024-04-11 PROCEDURE — 99396 PREV VISIT EST AGE 40-64: CPT | Performed by: STUDENT IN AN ORGANIZED HEALTH CARE EDUCATION/TRAINING PROGRAM

## 2024-04-11 PROCEDURE — 99214 OFFICE O/P EST MOD 30 MIN: CPT | Performed by: STUDENT IN AN ORGANIZED HEALTH CARE EDUCATION/TRAINING PROGRAM

## 2024-04-11 PROCEDURE — 96127 BRIEF EMOTIONAL/BEHAV ASSMT: CPT | Performed by: STUDENT IN AN ORGANIZED HEALTH CARE EDUCATION/TRAINING PROGRAM

## 2024-04-11 RX ORDER — ATROPINE SULFATE MONOHYDRATE 1 G/G
POWDER MISCELLANEOUS
COMMUNITY
Start: 2024-03-22

## 2024-04-11 RX ORDER — TAMSULOSIN HYDROCHLORIDE 0.4 MG/1
0.4 CAPSULE ORAL DAILY
Qty: 90 CAPSULE | Refills: 1 | Status: SHIPPED | OUTPATIENT
Start: 2024-04-11

## 2024-04-11 RX ORDER — TADALAFIL 20 MG/1
20 TABLET ORAL
Qty: 30 TABLET | Refills: 3 | Status: SHIPPED | OUTPATIENT
Start: 2024-04-11

## 2024-04-11 NOTE — PROGRESS NOTES
[facility]  04/11/24    Chief Complaint   Patient presents with    Physical     HPI:   Jackson Melgar is a 58 year old male who presents for a complete physical exam.    Following with weight loss clinic. Was on Mounjaro. Has been doing therapeutic phlebotomy once or twice a year. Would like iron levels checked.     Has had difficulty with sleep. Tried medications in the past.     Taking finasteride. Taking tamsulosin as well. Helps sometimes.    Taking tadalafil 1/2 tab to 1 full tab as needed and it works. No dizziness.     Has apple watch. It monitors sleep. For example last night woke up every hour thinking about things.    Methotrexate dose is being adjusted, now starting to feel some discomfort in the knees.    Saw Dr. Strange, dermatology, for psoriasis. Previously tried Enbrel as part of a study.    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     Wt Readings from Last 6 Encounters:   04/11/24 213 lb 11.2 oz (96.9 kg)   02/19/24 210 lb (95.3 kg)   12/30/23 219 lb (99.3 kg)   11/27/23 217 lb (98.4 kg)   10/12/23 213 lb 9.6 oz (96.9 kg)   08/24/23 211 lb (95.7 kg)     Body mass index is 28.32 kg/m².     Results for orders placed or performed in visit on 12/30/23   COVID-19 LAB TEST NON-CDC    Specimen: Nares   Result Value Ref Range    Rapid SARS-CoV-2 by PCR Not Detected Not Detected    POCT Lot Number [id number]      POCT Expiration Date 10/6/25     POCT Procedure Control Control Valid Control Valid     ,704          Current Outpatient Medications   Medication Sig Dispense Refill    Nandrolone Decanoate Does not apply Powder       tamsulosin 0.4 MG Oral Cap Take 1 capsule (0.4 mg total) by mouth daily. 90 capsule 1    Tadalafil 20 MG Oral Tab Take 1 tablet (20 mg total) by mouth daily as needed for Erectile Dysfunction. 30 tablet 3    hydrocortisone (PROCTO-MED HC) 2.5 % External Cream Place 1 Application rectally 2 (two) times daily as needed for Hemorrhoids. 28 g 1    senna-docusate 8.6-50 MG Oral Tab Take 1 tablet by mouth daily. 30 tablet 0    Testosterone Cypionate 100 MG/ML Intramuscular Solution Inject 0.1 mg into the muscle daily.      zonisamide 50 MG Oral Cap Take 1 capsule (50 mg total) by mouth daily. 90 capsule 3    FOLIC ACID 1 MG Oral Tab TAKE ONE TABLET BY MOUTH ONE TIME DAILY 90 tablet 0    methotrexate 2.5 MG Oral Tab Take 7 tablets (17.5 mg total) by mouth every 7 days. 135 tablet 1    Adalimumab (HUMIRA PEN) 40 MG/0.4ML Subcutaneous Pen-injector Kit Inject 40 mg into the skin every 14 (fourteen) days. 6 each 3    Lansoprazole 15 MG Oral Tablet Delayed Release Dispersible Take 1 tablet (15 mg total) by mouth every morning before breakfast.      finasteride 1 MG Oral Tab Take 1 tablet (1 mg total) by mouth daily.      Multiple Vitamin (MULTI-DAY VITAMINS) Oral Tab Take 1 tablet by mouth daily.      Cetirizine HCl 5 MG Oral Tab Take 1 tablet (5 mg total) by mouth daily.      Omega-3 Fatty Acids (FISH OIL OR) Take 1 capsule by mouth daily. 1 qd      Cholecalciferol (VITAMIN D) 2000 UNITS Oral Cap Take 1 capsule (2,000 Units total) by mouth daily.        No Known Allergies   Past Medical History:    Arthritis    Psoriasis    Psoriatic arthritis (HCC)    Tremor    Visual impairment    glasses      Past Surgical History:   Procedure Laterality Date    Biopsy of thyroid,percut  2009    Colonoscopy  2009     complete    Colonoscopy  2014,2010    x2 Dr. Cast     Forearm/wrist surgery unlisted Bilateral left-4/2017 and right-2011--Dr. Landeros    wrist ganglion cyst excision    Hemorrhoidectomy      Other surgical history  2004    Upper Gastrointestinal Endoscopy    Tonsillectomy  10/2018      History reviewed. No pertinent family history.   Social History:  Social History     Socioeconomic History    Marital status:    Tobacco Use    Smoking status: Never    Smokeless tobacco: Never   Vaping Use    Vaping status: Never Used   Substance and Sexual Activity    Alcohol use: Yes     Comment: a few times per week    Drug use: No   Other Topics Concern    Caffeine Concern Yes     Comment: 1 cup every other day    Exercise Yes     Comment: 7x/week         REVIEW OF SYSTEMS:   GENERAL: feels well otherwise  SKIN: denies any unusual skin lesions  EYES:denies blurred vision or double vision  HEENT: denies nasal congestion, sinus pain or ST  LUNGS: denies shortness of breath with exertion, denies cough  CARDIOVASCULAR: denies chest pain on exertion or at rest, denies palpitations  GI: denies abdominal pain,denies heartburn, denies n/v/d/c/blood in stool  : denies nocturia or changes in stream  MUSCULOSKELETAL: denies back pain  NEURO: denies headaches, denies LH/dizziness/syncope  PSYCHE: denies depression or anxiety  HEMATOLOGIC: denies hx of anemia  ENDOCRINE: denies thyroid history  ALL/ASTHMA: denies hx of allergy or asthma    EXAM:   /80 (BP Location: Left arm, Patient Position: Sitting, Cuff Size: large)   Pulse 72   Temp 97.6 °F (36.4 °C) (Temporal)   Resp 16   Ht 6' 0.84\" (1.85 m)   Wt 213 lb 11.2 oz (96.9 kg)   BMI 28.32 kg/m²   Body mass index is 28.32 kg/m².   GENERAL: well developed, well nourished,in no apparent distress  SKIN: no rashes,no suspicious lesions  HEENT: atraumatic, normocephalic,ears and throat are clear  EYES:PERRLA, EOMI, conjunctiva are clear  NECK: supple,no adenopathy,no  bruits, no thyromegaly  LUNGS: clear to auscultation, no r/r/w  CARDIO: RRR without murmur  GI: good BS's,no masses, HSM or tenderness  :  two descended testes, no masses, no hernia, no penile lesions  RECTAL: +external hemorrhoids, good rectal tone, prostate is mildly enlarged, no masses, stool is OB negative  MUSCULOSKELETAL: back is not tender, FROM of the back  EXTREMITIES: no cyanosis, clubbing or edema  NEURO: Oriented times three,cranial nerves are intact,motor and sensory are grossly intact; 2+ knee reflexes bilaterally  VASCULAR: 2+ posterior tibialis pulses bilaterally    ASSESSMENT AND PLAN:   Jackson Melgar is a 58 year old male who presents for a complete physical exam.     1. Wellness examination  - BP: at goal   - BMI: Body mass index is 28.32 kg/m²., c/w overweight BMI, recommended low fat diet and aerobic exercise 30 minutes three times weekly.   - Last colonoscopy: 09/15/2014. Pt referred for screening colonoscopy.   - CBC With Differential With Platelet; Future  - Comp Metabolic Panel (14); Future  - Lipid Panel; Future  - TSH W Reflex To Free T4; Future  - Urinalysis with Culture Reflex; Future    2. Screening for genitourinary condition  - Urinalysis with Culture Reflex; Future    3. Screening for colon cancer  - Referral to [facility] for Colonoscopy **Select a Doc to add to AVS**    4. Screening for prostate cancer  - PSA, Total - Diagnostic [E]; Future    5. Screening for endocrine, metabolic and immunity disorder  - CBC With Differential With Platelet; Future  - Comp Metabolic Panel (14); Future  - TSH W Reflex To Free T4; Future    6. Encounter for lipid screening for cardiovascular disease  - Lipid Panel; Future    7. Elevated red blood cell count  Patient had elevated RBC. Will check iron levels to evaluate for hemochromatosis.  - Iron And Tibc; Future  - Ferritin; Future    8. Other male erectile dysfunction  Patient presents for follow up of ED  - doing well  - continue current  regimen of tadalafil 20mg daily as needed  - follow up in 6mo or sooner if needed  - PSA, Total - Diagnostic [E]; Future  - Tadalafil 20 MG Oral Tab; Take 1 tablet (20 mg total) by mouth daily as needed for Erectile Dysfunction.  Dispense: 30 tablet; Refill: 3    9. Benign prostatic hyperplasia with nocturia  Patient presents for follow up of BPH with nocturia  - doing well  - continue current regimen of finasteride and tamsulosin  - follow up in 6mo or sooner if needed  - PSA, Total - Diagnostic [E]; Future    10. Nocturia more than twice per night  - tamsulosin 0.4 MG Oral Cap; Take 1 capsule (0.4 mg total) by mouth daily.  Dispense: 90 capsule; Refill: 1        The patient indicates understanding of these issues and agrees to the plan.    Health maintenance, will check:   Orders Placed This Encounter   Procedures    CBC With Differential With Platelet    Comp Metabolic Panel (14)    Lipid Panel    TSH W Reflex To Free T4    Urinalysis with Culture Reflex    Iron And Tibc    Ferritin    PSA, Total - Diagnostic [E]           Encounter Diagnoses   Name Primary?    Wellness examination Yes    Screening for genitourinary condition     Screening for colon cancer     Screening for prostate cancer     Screening for endocrine, metabolic and immunity disorder     Encounter for lipid screening for cardiovascular disease     Elevated red blood cell count     Other male erectile dysfunction     Benign prostatic hyperplasia with nocturia     Nocturia more than twice per night        Meds & Refills for this Visit:  Requested Prescriptions     Signed Prescriptions Disp Refills    tamsulosin 0.4 MG Oral Cap 90 capsule 1     Sig: Take 1 capsule (0.4 mg total) by mouth daily.    Tadalafil 20 MG Oral Tab 30 tablet 3     Sig: Take 1 tablet (20 mg total) by mouth daily as needed for Erectile Dysfunction.       Imaging & Consults:  GASTRO - INTERNAL      Return in about 6 months (around 10/11/2024) for medication follow up, or sooner  if needed.      Carissa Boyd MD  [facility]  04/11/24

## 2024-04-18 ENCOUNTER — LAB ENCOUNTER (OUTPATIENT)
Dept: LAB | Age: 59
End: 2024-04-18
Attending: STUDENT IN AN ORGANIZED HEALTH CARE EDUCATION/TRAINING PROGRAM
Payer: COMMERCIAL

## 2024-04-18 DIAGNOSIS — Z13.0 SCREENING FOR ENDOCRINE, METABOLIC AND IMMUNITY DISORDER: ICD-10-CM

## 2024-04-18 DIAGNOSIS — Z13.228 SCREENING FOR ENDOCRINE, METABOLIC AND IMMUNITY DISORDER: ICD-10-CM

## 2024-04-18 DIAGNOSIS — N52.8 OTHER MALE ERECTILE DYSFUNCTION: ICD-10-CM

## 2024-04-18 DIAGNOSIS — R35.1 BENIGN PROSTATIC HYPERPLASIA WITH NOCTURIA: ICD-10-CM

## 2024-04-18 DIAGNOSIS — Z12.5 SCREENING FOR PROSTATE CANCER: ICD-10-CM

## 2024-04-18 DIAGNOSIS — Z13.220 ENCOUNTER FOR LIPID SCREENING FOR CARDIOVASCULAR DISEASE: ICD-10-CM

## 2024-04-18 DIAGNOSIS — Z13.89 SCREENING FOR GENITOURINARY CONDITION: ICD-10-CM

## 2024-04-18 DIAGNOSIS — R71.8 ELEVATED RED BLOOD CELL COUNT: ICD-10-CM

## 2024-04-18 DIAGNOSIS — Z00.00 WELLNESS EXAMINATION: ICD-10-CM

## 2024-04-18 DIAGNOSIS — Z13.29 SCREENING FOR ENDOCRINE, METABOLIC AND IMMUNITY DISORDER: ICD-10-CM

## 2024-04-18 DIAGNOSIS — Z13.6 ENCOUNTER FOR LIPID SCREENING FOR CARDIOVASCULAR DISEASE: ICD-10-CM

## 2024-04-18 DIAGNOSIS — N40.1 BENIGN PROSTATIC HYPERPLASIA WITH NOCTURIA: ICD-10-CM

## 2024-04-18 LAB
ALBUMIN SERPL-MCNC: 3.6 G/DL (ref 3.4–5)
ALBUMIN/GLOB SERPL: 1.1 {RATIO} (ref 1–2)
ALP LIVER SERPL-CCNC: 78 U/L
ALT SERPL-CCNC: 29 U/L
ANION GAP SERPL CALC-SCNC: 5 MMOL/L (ref 0–18)
AST SERPL-CCNC: 17 U/L (ref 15–37)
BASOPHILS # BLD AUTO: 0.04 X10(3) UL (ref 0–0.2)
BASOPHILS NFR BLD AUTO: 0.5 %
BILIRUB SERPL-MCNC: 0.7 MG/DL (ref 0.1–2)
BILIRUB UR QL STRIP.AUTO: NEGATIVE
BUN BLD-MCNC: 15 MG/DL (ref 9–23)
CALCIUM BLD-MCNC: 8.8 MG/DL (ref 8.5–10.1)
CHLORIDE SERPL-SCNC: 110 MMOL/L (ref 98–112)
CHOLEST SERPL-MCNC: 136 MG/DL (ref ?–200)
CLARITY UR REFRACT.AUTO: CLEAR
CO2 SERPL-SCNC: 25 MMOL/L (ref 21–32)
COLOR UR AUTO: YELLOW
CREAT BLD-MCNC: 1.71 MG/DL
DEPRECATED HBV CORE AB SER IA-ACNC: 8.7 NG/ML
EGFRCR SERPLBLD CKD-EPI 2021: 46 ML/MIN/1.73M2 (ref 60–?)
EOSINOPHIL # BLD AUTO: 0.13 X10(3) UL (ref 0–0.7)
EOSINOPHIL NFR BLD AUTO: 1.8 %
ERYTHROCYTE [DISTWIDTH] IN BLOOD BY AUTOMATED COUNT: 18.8 %
FASTING PATIENT LIPID ANSWER: YES
FASTING STATUS PATIENT QL REPORTED: YES
GLOBULIN PLAS-MCNC: 3.4 G/DL (ref 2.8–4.4)
GLUCOSE BLD-MCNC: 96 MG/DL (ref 70–99)
GLUCOSE UR STRIP.AUTO-MCNC: NORMAL MG/DL
HCT VFR BLD AUTO: 44.2 %
HDLC SERPL-MCNC: 45 MG/DL (ref 40–59)
HGB BLD-MCNC: 14.1 G/DL
IMM GRANULOCYTES # BLD AUTO: 0.01 X10(3) UL (ref 0–1)
IMM GRANULOCYTES NFR BLD: 0.1 %
IRON SATN MFR SERPL: 12 %
IRON SERPL-MCNC: 39 UG/DL
KETONES UR STRIP.AUTO-MCNC: NEGATIVE MG/DL
LDLC SERPL CALC-MCNC: 80 MG/DL (ref ?–100)
LEUKOCYTE ESTERASE UR QL STRIP.AUTO: NEGATIVE
LYMPHOCYTES # BLD AUTO: 2.04 X10(3) UL (ref 1–4)
LYMPHOCYTES NFR BLD AUTO: 27.9 %
MCH RBC QN AUTO: 26.1 PG (ref 26–34)
MCHC RBC AUTO-ENTMCNC: 31.9 G/DL (ref 31–37)
MCV RBC AUTO: 81.7 FL
MONOCYTES # BLD AUTO: 0.73 X10(3) UL (ref 0.1–1)
MONOCYTES NFR BLD AUTO: 10 %
NEUTROPHILS # BLD AUTO: 4.37 X10 (3) UL (ref 1.5–7.7)
NEUTROPHILS # BLD AUTO: 4.37 X10(3) UL (ref 1.5–7.7)
NEUTROPHILS NFR BLD AUTO: 59.7 %
NITRITE UR QL STRIP.AUTO: NEGATIVE
NONHDLC SERPL-MCNC: 91 MG/DL (ref ?–130)
OSMOLALITY SERPL CALC.SUM OF ELEC: 291 MOSM/KG (ref 275–295)
PH UR STRIP.AUTO: 6 [PH] (ref 5–8)
PLATELET # BLD AUTO: 288 10(3)UL (ref 150–450)
POTASSIUM SERPL-SCNC: 4.2 MMOL/L (ref 3.5–5.1)
PROT SERPL-MCNC: 7 G/DL (ref 6.4–8.2)
PROT UR STRIP.AUTO-MCNC: 20 MG/DL
PSA SERPL-MCNC: 5.02 NG/ML (ref ?–4)
RBC # BLD AUTO: 5.41 X10(6)UL
RBC UR QL AUTO: NEGATIVE
SODIUM SERPL-SCNC: 140 MMOL/L (ref 136–145)
SP GR UR STRIP.AUTO: 1.03 (ref 1–1.03)
TIBC SERPL-MCNC: 332 UG/DL (ref 240–450)
TRANSFERRIN SERPL-MCNC: 223 MG/DL (ref 200–360)
TRIGL SERPL-MCNC: 49 MG/DL (ref 30–149)
TSI SER-ACNC: 2.25 MIU/ML (ref 0.36–3.74)
UROBILINOGEN UR STRIP.AUTO-MCNC: NORMAL MG/DL
VLDLC SERPL CALC-MCNC: 8 MG/DL (ref 0–30)
WBC # BLD AUTO: 7.3 X10(3) UL (ref 4–11)

## 2024-04-18 PROCEDURE — 81001 URINALYSIS AUTO W/SCOPE: CPT

## 2024-04-18 PROCEDURE — 84443 ASSAY THYROID STIM HORMONE: CPT

## 2024-04-18 PROCEDURE — 85025 COMPLETE CBC W/AUTO DIFF WBC: CPT

## 2024-04-18 PROCEDURE — 80061 LIPID PANEL: CPT

## 2024-04-18 PROCEDURE — 82728 ASSAY OF FERRITIN: CPT

## 2024-04-18 PROCEDURE — 83540 ASSAY OF IRON: CPT

## 2024-04-18 PROCEDURE — 83550 IRON BINDING TEST: CPT

## 2024-04-18 PROCEDURE — 80053 COMPREHEN METABOLIC PANEL: CPT

## 2024-04-18 PROCEDURE — 36415 COLL VENOUS BLD VENIPUNCTURE: CPT

## 2024-04-18 PROCEDURE — 84153 ASSAY OF PSA TOTAL: CPT

## 2024-04-19 DIAGNOSIS — R97.20 ELEVATED PSA: Primary | ICD-10-CM

## 2024-05-06 NOTE — PROCEDURES
[facility]  [address], [address] [place] [address]  Ph: [phone]  FAX: [phone]        Full Name: Emily Recio Gender: Male  Patient ID: [id number] YOB: 1970      Visit Date: 1/29/2020 08:12  Age: 7947. Attempted to get poct glucose in triage but monitor not working.    Ankle AH 4.01 12.6 100 6.72 34.4 Ankle - AH 8        Pop fossa AH 14.58 6.7 53.1 7.34 21.2 Pop fossa - Ankle 43.5 10.57 41       F  Wave      Nerve F Lat M Lat F-M Lat    ms ms ms   R Peroneal - EDB 52.4 4.8 47.6   R Tibial - AH 54.6 4.3 50.3   L Tibial This is a normal study. There is no electrophysiologic evidence to suggest an underlying large fiber polyneuropathy, or myopathy, or atypical neuropathy, or primary demyelinating neuropathy as clinically questioned.        Of note, this test would not rule

## 2024-05-18 ENCOUNTER — LAB ENCOUNTER (OUTPATIENT)
Dept: LAB | Facility: HOSPITAL | Age: 59
End: 2024-05-18
Attending: STUDENT IN AN ORGANIZED HEALTH CARE EDUCATION/TRAINING PROGRAM

## 2024-05-18 DIAGNOSIS — R97.20 ELEVATED PSA: ICD-10-CM

## 2024-05-18 LAB — PSA SERPL-MCNC: 2.83 NG/ML (ref ?–4)

## 2024-05-18 PROCEDURE — 84153 ASSAY OF PSA TOTAL: CPT

## 2024-05-18 PROCEDURE — 36415 COLL VENOUS BLD VENIPUNCTURE: CPT

## 2024-05-20 DIAGNOSIS — R97.20 PSA ELEVATION: Primary | ICD-10-CM

## 2024-05-26 DIAGNOSIS — G25.0 BENIGN ESSENTIAL TREMOR SYNDROME: ICD-10-CM

## 2024-05-28 RX ORDER — ZONISAMIDE 50 MG/1
50 CAPSULE ORAL DAILY
Qty: 90 CAPSULE | Refills: 0 | Status: SHIPPED | OUTPATIENT
Start: 2024-05-28

## 2024-05-28 NOTE — TELEPHONE ENCOUNTER
Medication: zonisamide 50 MG      Date of last refill: 4/19/23 (#90/3R)  [place]     Last office visit: 3/6/23  Due back to clinic per last office note:  1 yr  Date next office visit scheduled:    Future Appointments   Date Time Provider Department Center   9/26/2024  8:30 AM Chase Ayala MD [facility] GI           Last OV note recommendation:    Problem/s Identified this visit:   Benign essential tremor syndrome        Discussion plus Diagnostics & Treatment Orders:     Essential Tremor- Stable. Continue the zonegran 50mg daily

## 2024-06-11 ENCOUNTER — PATIENT MESSAGE (OUTPATIENT)
Dept: FAMILY MEDICINE CLINIC | Facility: CLINIC | Age: 59
End: 2024-06-11

## 2024-06-11 DIAGNOSIS — R97.20 ELEVATED PSA: Primary | ICD-10-CM

## 2024-06-11 NOTE — TELEPHONE ENCOUNTER
From: Jackson Melgar  To: Carissa Boyd  Sent: 6/11/2024 11:52 AM CDT  Subject: PSA    Could you please submit another PSA test. I have it to connect with a Urologist, but also now realize the connection to other activities. I have also increased my physical activity in the last year (a little bit of an understatement) which may have affected the results.   Thank you.

## 2024-06-12 ENCOUNTER — LAB ENCOUNTER (OUTPATIENT)
Dept: LAB | Age: 59
End: 2024-06-12
Attending: STUDENT IN AN ORGANIZED HEALTH CARE EDUCATION/TRAINING PROGRAM
Payer: COMMERCIAL

## 2024-06-12 DIAGNOSIS — R97.20 ELEVATED PSA: ICD-10-CM

## 2024-06-12 LAB — PSA SERPL-MCNC: 1.36 NG/ML (ref ?–4)

## 2024-06-12 PROCEDURE — 84153 ASSAY OF PSA TOTAL: CPT

## 2024-07-10 ENCOUNTER — OFFICE VISIT (OUTPATIENT)
Dept: NEUROLOGY | Facility: CLINIC | Age: 59
End: 2024-07-10
Payer: COMMERCIAL

## 2024-07-10 VITALS
HEIGHT: 72.84 IN | SYSTOLIC BLOOD PRESSURE: 132 MMHG | DIASTOLIC BLOOD PRESSURE: 74 MMHG | WEIGHT: 211 LBS | BODY MASS INDEX: 27.96 KG/M2 | RESPIRATION RATE: 16 BRPM | HEART RATE: 65 BPM

## 2024-07-10 DIAGNOSIS — G25.0 BENIGN ESSENTIAL TREMOR SYNDROME: Primary | ICD-10-CM

## 2024-07-10 PROCEDURE — 99213 OFFICE O/P EST LOW 20 MIN: CPT | Performed by: PHYSICIAN ASSISTANT

## 2024-07-10 RX ORDER — ZONISAMIDE 25 MG/1
CAPSULE ORAL
Qty: 270 CAPSULE | Refills: 3 | Status: SHIPPED | OUTPATIENT
Start: 2024-07-10

## 2024-07-10 NOTE — PATIENT INSTRUCTIONS
Refill policies:    Allow 2-3 business days for refills; controlled substances may take longer.  Contact your pharmacy at least 5 days prior to running out of medication and have them send an electronic request or submit request through the “request refill” option in your [employer] account.  Refills are not addressed on weekends; covering physicians do not authorize routine medications on weekends.  No narcotics or controlled substances are refilled after noon on Fridays or by on call physicians.  By law, narcotics must be electronically prescribed.  A 30 day supply with no refills is the maximum allowed.  If your prescription is due for a refill, you may be due for a follow up appointment.  To best provide you care, patients receiving routine medications need to be seen at least once a year.  Patients receiving narcotic/controlled substance medications need to be seen at least once every 3 months.  In the event that your preferred pharmacy does not have the requested medication in stock (e.g. Backordered), it is your responsibility to find another pharmacy that has the requested medication available.  We will gladly send a new prescription to that pharmacy at your request.    Scheduling Tests:    If your physician has ordered radiology tests such as MRI or CT scans, please contact Central Scheduling at [phone] right away to schedule the test.  Once scheduled, the [facility] Centralized Referral Team will work with your insurance carrier to obtain pre-certification or prior authorization.  Depending on your insurance carrier, approval may take 3-10 days.  It is highly recommended patients assure they have received an authorization before having a test performed.  If test is done without insurance authorization, patient may be responsible for the entire amount billed.      Precertification and Prior Authorizations:  If your physician has recommended that you have a procedure or additional testing performed the [facility]  Centralized Referral Team will contact your insurance carrier to obtain pre-certification or prior authorization.    You are strongly encouraged to contact your insurance carrier to verify that your procedure/test has been approved and is a COVERED benefit.  Although the [facility] Centralized Referral Team does its due diligence, the insurance carrier gives the disclaimer that \"Although the procedure is authorized, this does not guarantee payment.\"    Ultimately the patient is responsible for payment.   Thank you for your understanding in this matter.  Paperwork Completion:  If you require FMLA or disability paperwork for your recovery, please make sure to either drop it off or have it faxed to our office at [phone]. Be sure the form has your name and date of birth on it.  The form will be faxed to our Forms Department and they will complete it for you.  There is a 25$ fee for all forms that need to be filled out.  Please be aware there is a 10-14 day turnaround time.  You will need to sign a release of information (KARLO) form if your paperwork does not come with one.  You may call the Forms Department with any questions at [phone].  Their fax number is [phone].

## 2024-07-10 NOTE — PROGRESS NOTES
NEUROLOGY  [facility]       Previous visit and existing record notes reviewed in preparation for the face to face visit.  Relevant labs and studies reviewed and will be noted in relevant areas of this record.    Jackson Melgar  7/13/1965  Primary Care Provider:  Carissa Boyd MD    7/10/2024  58 year old male,      was last seen on: Patient was last seen 3/6/23 and at that time tremors were well controlled    Seen for/plans last visit:  Essential Tremor    Accompanied visit: No      Present condition:  He notices the tremors in his hands on occasion when holding items. Notices it in the left hand more then the right  He took a video of himself drinking a  beer and when he sent it out he did not realize his hand was shaking when holding the beer until watching the video back  He is currently on the zonegran. He is tolerating it without side effects  He tries to minimize caffeine  Has been more active recently.       Past History update/new problem(s): Hernia Repair February 2024    Review of Systems:  Review of Systems:  Denies systemic symptoms     No CP or SOB.  No GI or  symptoms. Relevant Neuro as noted above.      Medications:      Current Outpatient Medications:     Ubiquinol (QUNOL COQ10/UBIQUINOL/MAREK) 100 MG Oral Cap, Take 1 capsule by mouth daily., Disp: , Rfl:     zonisamide (ZONEGRAN) 25 MG Oral Cap, Take 3 capsules qhs, Disp: 270 capsule, Rfl: 3    Nandrolone Decanoate Does not apply Powder, , Disp: , Rfl:     tamsulosin 0.4 MG Oral Cap, Take 1 capsule (0.4 mg total) by mouth daily., Disp: 90 capsule, Rfl: 1    Tadalafil 20 MG Oral Tab, Take 1 tablet (20 mg total) by mouth daily as needed for Erectile Dysfunction., Disp: 30 tablet, Rfl: 3    hydrocortisone (PROCTO-MED HC) 2.5 % External Cream, Place 1 Application rectally 2 (two) times daily as needed for Hemorrhoids., Disp: 28 g, Rfl: 1    senna-docusate 8.6-50 MG Oral Tab, Take 1 tablet by mouth daily., Disp: 30 tablet, Rfl:  0    Testosterone Cypionate 100 MG/ML Intramuscular Solution, Inject 0.1 mg into the muscle daily., Disp: , Rfl:     FOLIC ACID 1 MG Oral Tab, TAKE ONE TABLET BY MOUTH ONE TIME DAILY, Disp: 90 tablet, Rfl: 0    methotrexate 2.5 MG Oral Tab, Take 7 tablets (17.5 mg total) by mouth every 7 days. (Patient taking differently: Take 5 tablets (12.5 mg total) by mouth every 7 days.), Disp: 135 tablet, Rfl: 1    Adalimumab (HUMIRA PEN) 40 MG/0.4ML Subcutaneous Pen-injector Kit, Inject 40 mg into the skin every 14 (fourteen) days., Disp: 6 each, Rfl: 3    Lansoprazole 15 MG Oral Tablet Delayed Release Dispersible, Take 1 tablet (15 mg total) by mouth every morning before breakfast., Disp: , Rfl:     finasteride 1 MG Oral Tab, Take 1 tablet (1 mg total) by mouth daily., Disp: , Rfl:     Multiple Vitamin (MULTI-DAY VITAMINS) Oral Tab, Take 1 tablet by mouth daily., Disp: , Rfl:     Cetirizine HCl 5 MG Oral Tab, Take 1 tablet (5 mg total) by mouth daily., Disp: , Rfl:     Omega-3 Fatty Acids (FISH OIL OR), Take 1 capsule by mouth daily. 1 qd, Disp: , Rfl:     Cholecalciferol (VITAMIN D) 2000 UNITS Oral Cap, Take 1 capsule (2,000 Units total) by mouth daily., Disp: , Rfl:   PRN:     Allergies:  No Known Allergies       EXAM:  /74 (BP Location: Left arm, Patient Position: Sitting, Cuff Size: adult)   Pulse 65   Resp 16   Ht 72.84\"   Wt 211 lb (95.7 kg)   BMI 27.96 kg/m²   Looks stated age  General Exam:  HENT:  pink conjunctiva anicteric sclerae  Neck no adenopathy, thyroid normal  Heart and Lungs:  normal  Extremities: no cyanosis, skin changes    NEURO  NAD, A&Ox3  EOMI  CN II-XII intact  Strength 5/5 all extremities  DTRs 2+ and symmetric  FTN intact bilaterally  Postural tremors L>R  Mild intention tremors bilaterally  No cogwheel or lead pipe rigidity  No slowed finger movements bilaterally  Normal gait  Tandem gait intact  Romberg negative    Problem/s Identified this visit:   1. Benign essential tremor syndrome           Discussion plus Diagnostics & Treatment Orders:    Essential Tremor- Will increase the zonegran to 75mg nightly and see if it improves control of his tremors. Discussed other options for treatment including dimas trio, primidone, DBS and ultrasound guided treatment. He is happy with the current management of his tremors.    (X) Discussed potential side effects of any treatment relevant to above.  Includes explanation of tests as necessary.    Return in about 1 year (around 7/10/2025).      Patient understands that if needed, based on condition and or test results, follow up will be readjusted      Sissy Lloyd PA-C  [facility]  7/10/2024, Time completed 2:40 PM

## 2024-09-26 PROBLEM — Z86.0101 HISTORY OF ADENOMATOUS POLYP OF COLON: Status: ACTIVE | Noted: 2024-09-26

## 2024-09-26 PROBLEM — D12.3 BENIGN NEOPLASM OF TRANSVERSE COLON: Status: ACTIVE | Noted: 2024-09-26

## 2024-09-26 PROBLEM — D12.0 BENIGN NEOPLASM OF CECUM: Status: ACTIVE | Noted: 2024-09-26

## 2024-09-26 PROBLEM — D12.5 BENIGN NEOPLASM OF SIGMOID COLON: Status: ACTIVE | Noted: 2024-09-26

## 2024-10-20 ENCOUNTER — PATIENT MESSAGE (OUTPATIENT)
Dept: FAMILY MEDICINE CLINIC | Facility: CLINIC | Age: 59
End: 2024-10-20

## 2024-10-21 NOTE — TELEPHONE ENCOUNTER
Patient's iron levels in the past have been low so this is not consistent with hemochromatosis. Thank you.

## 2024-11-21 ENCOUNTER — OFFICE VISIT (OUTPATIENT)
Dept: FAMILY MEDICINE CLINIC | Facility: CLINIC | Age: 59
End: 2024-11-21
Payer: COMMERCIAL

## 2024-11-21 VITALS
WEIGHT: 209 LBS | HEIGHT: 72.84 IN | HEART RATE: 69 BPM | DIASTOLIC BLOOD PRESSURE: 76 MMHG | RESPIRATION RATE: 16 BRPM | TEMPERATURE: 97 F | SYSTOLIC BLOOD PRESSURE: 128 MMHG | BODY MASS INDEX: 27.7 KG/M2

## 2024-11-21 DIAGNOSIS — K57.90 DIVERTICULOSIS: Primary | ICD-10-CM

## 2024-11-21 DIAGNOSIS — N52.8 OTHER MALE ERECTILE DYSFUNCTION: ICD-10-CM

## 2024-11-21 DIAGNOSIS — Z86.0101 HISTORY OF ADENOMATOUS POLYP OF COLON: ICD-10-CM

## 2024-11-21 DIAGNOSIS — K64.1 GRADE II HEMORRHOIDS: ICD-10-CM

## 2024-11-21 DIAGNOSIS — R04.0 EPISTAXIS: ICD-10-CM

## 2024-11-21 PROCEDURE — 99214 OFFICE O/P EST MOD 30 MIN: CPT | Performed by: STUDENT IN AN ORGANIZED HEALTH CARE EDUCATION/TRAINING PROGRAM

## 2024-11-21 RX ORDER — HYDROCORTISONE 25 MG/G
1 CREAM TOPICAL 2 TIMES DAILY PRN
Qty: 28 G | Refills: 2 | Status: SHIPPED | OUTPATIENT
Start: 2024-11-21

## 2024-11-21 RX ORDER — TADALAFIL 20 MG/1
20 TABLET ORAL
Qty: 30 TABLET | Refills: 3 | Status: SHIPPED | OUTPATIENT
Start: 2024-11-21

## 2024-11-21 NOTE — PROGRESS NOTES
[facility] Note  11/21/24    Chief Complaint   Patient presents with    Test Results     HPI:   Jackson Melgar is a 59 year old male who presents for follow up.    Would like to discuss colonoscopy results. Showed hemorrhoids, diverticulosis, polyps.    Would like refill of hemorrhoids creams. Had banding in 2016.     He knows some people with diverticulitis.    Trying to be more consistent with an apple a day.    Some increased blood noses. Had one last night at an event.    Sometimes flushed and face is red as well. No dizziness or sweating.     Patient is getting treated with testosterone replacement.     Wt Readings from Last 6 Encounters:   11/21/24 209 lb (94.8 kg)   08/27/24 200 lb (90.7 kg)   07/10/24 211 lb (95.7 kg)   04/11/24 213 lb 11.2 oz (96.9 kg)   02/19/24 210 lb (95.3 kg)   12/30/23 219 lb (99.3 kg)       Past Medical History:    Arthritis    Hemorrhoids    Pain in joints    Psoriasis    Psoriatic arthritis (HCC)    Sleep disturbance    Stress    Tremor    Visual impairment    glasses     Past Surgical History:   Procedure Laterality Date    Biopsy of thyroid,percut  2009    Colonoscopy  2009    complete    Colonoscopy  2014,2010    x2 Dr. Cast     Colonoscopy      Forearm/wrist surgery unlisted Bilateral left-4/2017 and right-2011--Dr. Landeros    wrist ganglion cyst excision    Hemorrhoidectomy      Hemorrhoidectomy,int/ext,simple      Hernia repair  02/19/2024    Other surgical history  2004    Upper Gastrointestinal Endoscopy    Tonsillectomy  10/2018     Allergies[1]   Tadalafil 20 MG Oral Tab Take 1 tablet (20 mg total) by mouth daily as needed for Erectile Dysfunction. 30 tablet 3    hydrocortisone (PROCTO-MED HC) 2.5 % External Cream Place 1 Application rectally 2 (two) times daily as needed for Hemorrhoids. 28 g 2    Ubiquinol (QUNOL COQ10/UBIQUINOL/MAREK) 100 MG Oral Cap Take 1 capsule by mouth daily.      zonisamide (ZONEGRAN) 25 MG Oral Cap Take 3 capsules  qhs 270 capsule 3    Nandrolone Decanoate Does not apply Powder       tamsulosin 0.4 MG Oral Cap Take 1 capsule (0.4 mg total) by mouth daily. 90 capsule 1    senna-docusate 8.6-50 MG Oral Tab Take 1 tablet by mouth daily. 30 tablet 0    Testosterone Cypionate 100 MG/ML Intramuscular Solution Inject 0.1 mg into the muscle daily.      FOLIC ACID 1 MG Oral Tab TAKE ONE TABLET BY MOUTH ONE TIME DAILY 90 tablet 0    methotrexate 2.5 MG Oral Tab Take 7 tablets (17.5 mg total) by mouth every 7 days. (Patient taking differently: Take 5 tablets (12.5 mg total) by mouth every 7 days.) 135 tablet 1    Adalimumab (HUMIRA PEN) 40 MG/0.4ML Subcutaneous Pen-injector Kit Inject 40 mg into the skin every 14 (fourteen) days. 6 each 3    Lansoprazole 15 MG Oral Tablet Delayed Release Dispersible Take 1 tablet (15 mg total) by mouth every morning before breakfast.      finasteride 1 MG Oral Tab Take 1 tablet (1 mg total) by mouth daily.      Multiple Vitamin (MULTI-DAY VITAMINS) Oral Tab Take 1 tablet by mouth daily.      Cetirizine HCl 5 MG Oral Tab Take 1 tablet (5 mg total) by mouth daily.      Omega-3 Fatty Acids (FISH OIL OR) Take 1 capsule by mouth daily. 1 qd      Cholecalciferol (VITAMIN D) 2000 UNITS Oral Cap Take 1 capsule (2,000 Units total) by mouth daily.       Social History     Socioeconomic History    Marital status:    Tobacco Use    Smoking status: Never    Smokeless tobacco: Never   Vaping Use    Vaping status: Never Used   Substance and Sexual Activity    Alcohol use: Yes     Alcohol/week: 2.0 standard drinks of alcohol     Types: 2 Cans of beer per week     Comment: a few times per week    Drug use: No   Other Topics Concern    Caffeine Concern Yes     Comment: 1 cup every other day    Exercise Yes     Comment: 7x/week     Counseling given: Not Answered    Family History   Problem Relation Age of Onset    Alcohol and Other Disorders Associated Father     Alcohol and Other Disorders Associated Mother      Breast Cancer Mother     Colon Cancer Maternal Grandmother     Diabetes Sister     Heart Attack Brother      Family Status   Relation Status    Fa     Mo Alive    MGMA     MGFA     PGMA     PGFA     Sis (Not Specified)    Bro (Not Specified)        REVIEW OF SYSTEMS:   See HPI    EXAM:   /76   Pulse 69   Temp 97.1 °F (36.2 °C) (Temporal)   Resp 16   Ht 6' 0.84\" (1.85 m)   Wt 209 lb (94.8 kg)   BMI 27.70 kg/m²  Estimated body mass index is 27.7 kg/m² as calculated from the following:    Height as of this encounter: 6' 0.84\" (1.85 m).    Weight as of this encounter: 209 lb (94.8 kg).   Vital signs reviewed. Appears stated age, well groomed.  Physical Exam:  GEN:  Patient is alert and oriented x3, no apparent distress  HEAD:  Normocephalic, atraumatic  HEENT:  no scleral icterus, conjunctivae clear bilaterally  LUNGS: clear to auscultation bilaterally, no rales/rhonchi/wheezing  HEART:  Regular rate and rhythm, normal S1/S2, no murmurs, rubs or gallops  ABDOMEN:  Bowel sounds normal, soft, nondistended, nontender, no hepatosplenomegaly  NEURO:  CN 2 - 12 grossly intact, gait normal      ASSESSMENT AND PLAN:   1. Diverticulosis  Patient has new diagnosis of diverticulosis. Advised high fiber diet, adequate hydration. If any cramping/pain or changes in bowel movements should be evaluated.    2. Grade II hemorrhoids  Can use topicals when symptomatic. Limit constipation. Consider surgical treatment if desired.  - hydrocortisone (PROCTO-MED HC) 2.5 % External Cream; Place 1 Application rectally 2 (two) times daily as needed for Hemorrhoids.  Dispense: 28 g; Refill: 2    3. Other male erectile dysfunction  Stable, will continue current regimen  - Tadalafil 20 MG Oral Tab; Take 1 tablet (20 mg total) by mouth daily as needed for Erectile Dysfunction.  Dispense: 30 tablet; Refill: 3    4. History of adenomatous polyp of colon  Per GI    5. Epistaxis  Humidifier, nasal saline  or nasal saline gel. Avoid blowing nose for several days after epistaxis occurs. Consider ENT if recurrent/worsening.        Meds & Refills for this Visit:  Requested Prescriptions     Signed Prescriptions Disp Refills    Tadalafil 20 MG Oral Tab 30 tablet 3     Sig: Take 1 tablet (20 mg total) by mouth daily as needed for Erectile Dysfunction.    hydrocortisone (PROCTO-MED HC) 2.5 % External Cream 28 g 2     Sig: Place 1 Application rectally 2 (two) times daily as needed for Hemorrhoids.       Stop Taking                PEG 3350-KCl-Na Bicarb-NaCl 420 g Oral Recon Soln    Take as directed by physician            Health Maintenance:  Health Maintenance Due   Topic Date Due    TB Screen  07/21/2022    COVID-19 Vaccine (5 - 2024-25 season) 09/01/2024    Influenza Vaccine (1) 10/01/2024       Patient/Caregiver Education: There are no barriers to learning. Medical education done.   Outcome: Patient verbalizes understanding. Patient is notified to call with any questions, complications, allergies, or worsening or changing symptoms.  Patient is to call with any side effects or complications from the treatments as a result of today.     Problem List:  Patient Active Problem List   Diagnosis    Psoriatic arthritis (HCC)    Pain in joint, multiple sites    Encounter for long-term (current) use of other medications    Plaque psoriasis    Unspecified vitamin D deficiency    Decreased libido    Insomnia, unspecified    Other male erectile dysfunction    Psoriatic arthropathy (HCC)    Nontoxic uninodular goiter    Arthropathy, unspecified, site unspecified    Achilles tendinitis    Benign essential tremor syndrome    Encounter for long-term (current) use of high-risk medication    SX/GLOBAL/  /ASC/EXCISION LEFT DORSAL WRIST GANGLION / DOS 04/11/18 / EXP 07/10/18    Contusion of rib on right side, initial encounter    Sprain of right sternoclavicular joint, initial encounter    Reducible right inguinal hernia     Incarcerated right inguinal hernia    Benign prostatic hyperplasia with nocturia    History of adenomatous polyp of colon    Benign neoplasm of cecum    Benign neoplasm of transverse colon    Benign neoplasm of sigmoid colon       Return if symptoms worsen or fail to improve.    Carissa Boyd MD  [facility]  11/21/24      Please note that portions of this note may have been completed with a voice recognition program. Efforts were made to edit the dictations but occasionally words are mis-transcribed. Thank you for your understanding.           [1] No Known Allergies

## 2025-02-12 ENCOUNTER — OFFICE VISIT (OUTPATIENT)
Dept: FAMILY MEDICINE CLINIC | Facility: CLINIC | Age: 60
End: 2025-02-12
Payer: COMMERCIAL

## 2025-02-12 VITALS
WEIGHT: 203 LBS | RESPIRATION RATE: 18 BRPM | HEIGHT: 72.84 IN | HEART RATE: 79 BPM | OXYGEN SATURATION: 98 % | BODY MASS INDEX: 26.9 KG/M2 | TEMPERATURE: 97 F | SYSTOLIC BLOOD PRESSURE: 130 MMHG | DIASTOLIC BLOOD PRESSURE: 72 MMHG

## 2025-02-12 DIAGNOSIS — R30.0 DYSURIA: ICD-10-CM

## 2025-02-12 DIAGNOSIS — N30.01 ACUTE CYSTITIS WITH HEMATURIA: Primary | ICD-10-CM

## 2025-02-12 DIAGNOSIS — R39.9 UTI SYMPTOMS: ICD-10-CM

## 2025-02-12 LAB
BILIRUBIN: NEGATIVE
GLUCOSE (URINE DIPSTICK): NEGATIVE MG/DL
KETONES (URINE DIPSTICK): NEGATIVE MG/DL
MULTISTIX LOT#: ABNORMAL NUMERIC
NITRITE, URINE: POSITIVE
PH, URINE: 7 (ref 4.5–8)
PROTEIN (URINE DIPSTICK): 100 MG/DL
SPECIFIC GRAVITY: 1.02 (ref 1–1.03)
URINE-COLOR: YELLOW
UROBILINOGEN,SEMI-QN: 0.2 MG/DL (ref 0–1.9)

## 2025-02-12 PROCEDURE — 87086 URINE CULTURE/COLONY COUNT: CPT | Performed by: STUDENT IN AN ORGANIZED HEALTH CARE EDUCATION/TRAINING PROGRAM

## 2025-02-12 PROCEDURE — 87186 SC STD MICRODIL/AGAR DIL: CPT | Performed by: STUDENT IN AN ORGANIZED HEALTH CARE EDUCATION/TRAINING PROGRAM

## 2025-02-12 PROCEDURE — 87088 URINE BACTERIA CULTURE: CPT | Performed by: STUDENT IN AN ORGANIZED HEALTH CARE EDUCATION/TRAINING PROGRAM

## 2025-02-12 PROCEDURE — 99214 OFFICE O/P EST MOD 30 MIN: CPT | Performed by: STUDENT IN AN ORGANIZED HEALTH CARE EDUCATION/TRAINING PROGRAM

## 2025-02-12 PROCEDURE — 81003 URINALYSIS AUTO W/O SCOPE: CPT | Performed by: STUDENT IN AN ORGANIZED HEALTH CARE EDUCATION/TRAINING PROGRAM

## 2025-02-12 RX ORDER — CIPROFLOXACIN 500 MG/1
500 TABLET, FILM COATED ORAL 2 TIMES DAILY
Qty: 14 TABLET | Refills: 0 | Status: SHIPPED | OUTPATIENT
Start: 2025-02-12 | End: 2025-02-15

## 2025-02-12 NOTE — PROGRESS NOTES
Jackson Melgar is a 59 year old male.  Chief Complaint   Patient presents with    Urinary Symptoms     C/o burning urination started yesterday afternoon      HPI:   Patient presents with symptoms of UTI.    Started yesterday afternoon. Lots of urgency and frequency, burning. Some feverish feeling.     Has some generalized joint pain, had a workout on Sunday and was still somewhat achy. Now some back pain.    No recent constipation. Has been having an apple a day still to help bowel movements.     Last UTI was 5 years ago.     No rashes, nausea, vomiting.     Current Outpatient Medications   Medication Sig Dispense Refill    ciprofloxacin 500 MG Oral Tab Take 1 tablet (500 mg total) by mouth 2 (two) times daily. 14 tablet 0    Tadalafil 20 MG Oral Tab Take 1 tablet (20 mg total) by mouth daily as needed for Erectile Dysfunction. 30 tablet 3    hydrocortisone (PROCTO-MED HC) 2.5 % External Cream Place 1 Application rectally 2 (two) times daily as needed for Hemorrhoids. 28 g 2    Ubiquinol (QUNOL COQ10/UBIQUINOL/MAREK) 100 MG Oral Cap Take 1 capsule by mouth daily.      zonisamide (ZONEGRAN) 25 MG Oral Cap Take 3 capsules qhs 270 capsule 3    Nandrolone Decanoate Does not apply Powder       tamsulosin 0.4 MG Oral Cap Take 1 capsule (0.4 mg total) by mouth daily. 90 capsule 1    senna-docusate 8.6-50 MG Oral Tab Take 1 tablet by mouth daily. 30 tablet 0    Testosterone Cypionate 100 MG/ML Intramuscular Solution Inject 0.1 mg into the muscle daily.      FOLIC ACID 1 MG Oral Tab TAKE ONE TABLET BY MOUTH ONE TIME DAILY 90 tablet 0    methotrexate 2.5 MG Oral Tab Take 7 tablets (17.5 mg total) by mouth every 7 days. (Patient taking differently: Take 5 tablets (12.5 mg total) by mouth every 7 days.) 135 tablet 1    Adalimumab (HUMIRA PEN) 40 MG/0.4ML Subcutaneous Pen-injector Kit Inject 40 mg into the skin every 14 (fourteen) days. 6 each 3    Lansoprazole 15 MG Oral Tablet Delayed Release Dispersible Take 1 tablet (15 mg  total) by mouth every morning before breakfast.      finasteride 1 MG Oral Tab Take 1 tablet (1 mg total) by mouth daily.      Multiple Vitamin (MULTI-DAY VITAMINS) Oral Tab Take 1 tablet by mouth daily.      Cetirizine HCl 5 MG Oral Tab Take 1 tablet (5 mg total) by mouth daily.      Omega-3 Fatty Acids (FISH OIL OR) Take 1 capsule by mouth daily. 1 qd      Cholecalciferol (VITAMIN D) 2000 UNITS Oral Cap Take 1 capsule (2,000 Units total) by mouth daily.        Past Medical History:    Allergic rhinitis    Arthritis    Esophageal reflux    Hemorrhoids    Pain in joints    Psoriasis    Psoriatic arthritis (HCC)    Sleep disturbance    Stress    Tremor    Visual impairment    glasses      Social History:  Social History     Socioeconomic History    Marital status:    Tobacco Use    Smoking status: Never    Smokeless tobacco: Never   Vaping Use    Vaping status: Never Used   Substance and Sexual Activity    Alcohol use: Yes     Alcohol/week: 4.0 standard drinks of alcohol     Types: 2 Cans of beer, 2 Standard drinks or equivalent per week     Comment: Sporadic    Drug use: Never   Other Topics Concern    Caffeine Concern No    Stress Concern No    Weight Concern Yes     Comment: Becoming less after dropping 30 lbs    Special Diet No    Exercise Yes     Comment: Walking and resistance training    Seat Belt Yes         REVIEW OF SYSTEMS:   GENERAL HEALTH: feels well otherwise  SKIN: denies any unusual skin lesions or rashes  RESPIRATORY: no shortness of breath with exertion  CARDIOVASCULAR: denies chest pain on exertion and palpitations.  GI: no nausea or vomiting  : see HPI  NEURO: denies headaches and dizziness.    EXAM:   /72   Pulse 79   Temp 97.2 °F (36.2 °C) (Temporal)   Resp 18   Ht 6' 0.84\" (1.85 m)   Wt 203 lb (92.1 kg)   SpO2 98%   BMI 26.90 kg/m²   GENERAL: well developed, well nourished,in no apparent distress  LUNG: Clear to auscultation bilaterally. No W/R/R.  HEART: RRR, no  murmur.  SKIN: no rashes,no suspicious lesions  GI: good BS's,no masses, HSM; no suprapubic tenderness, no CVAT  RECTAL: prostate is enlarged, smooth, no tenderness or masses appreciated   EXT: warm      URINALYSIS:     Recent Results (from the past 24 hours)   URINALYSIS, AUTO, W/O SCOPE    Collection Time: 02/12/25  1:21 PM   Result Value Ref Range    Glucose Urine Negative Negative mg/dL    Bilirubin Urine Negative Negative    Ketones, UA Negative Negative - Trace mg/dL    Spec Gravity 1.020 1.005 - 1.030    Blood Urine Moderate (A) Negative    PH Urine 7.0 5.0 - 8.0    Protein Urine 100 (A) Negative - Trace mg/dL    Urobilinogen Urine 0.2 0.2 - 1.0 mg/dL    Nitrite Urine Positive (A) Negative    Leukocyte Esterase Urine Large (A) Negative    APPEARANCE Cloudy Clear    Color Urine Yellow Yellow    Multistix Lot# [id number] Numeric    Multistix Expiration Date 8/31/2025 Date         ASSESSMENT AND PLAN:   Acute cystitis with hematuria. Plan is to start cipro 500mg BID x7 days.   May take otc pyridium for discomfort if needed.    Take antibiotics with food.  Eat yogurt daily or use probiotics.   Discussed side effects and expected outcomes of medications.  Instructions given on increasing fluid intake, bladder emptying after intercourse.   The patient indicates understanding of these issues and agrees to the plan.    Orders Placed This Encounter   Procedures    URINALYSIS, AUTO, W/O SCOPE    Urine Culture, Routine       Call if fever, vomiting, worsening symptoms.      Meds & Refills for this Visit:  Requested Prescriptions     Signed Prescriptions Disp Refills    ciprofloxacin 500 MG Oral Tab 14 tablet 0     Sig: Take 1 tablet (500 mg total) by mouth 2 (two) times daily.       Start Taking               ciprofloxacin 500 MG Oral Tab Take 1 tablet (500 mg total) by mouth 2 (two) times daily.            Patient/Caregiver Education: There are no barriers to learning. Medical education done.   Outcome: Patient verbalizes  understanding. Patient is notified to call with any questions, complications, allergies, or worsening or changing symptoms.  Patient is to call with any side effects or complications from the treatments as a result of today.       Return if symptoms worsen or fail to improve.      Carissa Boyd MD  [facility]  02/12/25      Please note that portions of this note may have been completed with a voice recognition program. Efforts were made to edit the dictations but occasionally words are mis-transcribed. Thank you for your understanding.    The 21st Century Cures Act makes medical notes like these available to patients in the interest of transparency. Please be advised this is a medical document. Medical documents are intended to carry relevant information, facts as evident, and the clinical opinion of the practitioner. The medical note is intended as peer to peer communication and may appear blunt or direct. It is written in medical language and may contain abbreviations or verbiage that are unfamiliar. If there are any questions or concerns please contact the provider for clarification.

## 2025-02-13 ENCOUNTER — HOSPITAL ENCOUNTER (OUTPATIENT)
Facility: HOSPITAL | Age: 60
Setting detail: OBSERVATION
Discharge: HOME | End: 2025-02-15
Attending: EMERGENCY MEDICINE | Admitting: INTERNAL MEDICINE
Payer: COMMERCIAL

## 2025-02-13 ENCOUNTER — APPOINTMENT (OUTPATIENT)
Dept: CT IMAGING | Facility: HOSPITAL | Age: 60
End: 2025-02-13
Attending: EMERGENCY MEDICINE
Payer: COMMERCIAL

## 2025-02-13 ENCOUNTER — OFFICE VISIT (OUTPATIENT)
Dept: FAMILY MEDICINE CLINIC | Facility: CLINIC | Age: 60
End: 2025-02-13
Payer: COMMERCIAL

## 2025-02-13 VITALS
RESPIRATION RATE: 18 BRPM | TEMPERATURE: 99 F | HEART RATE: 87 BPM | OXYGEN SATURATION: 98 % | DIASTOLIC BLOOD PRESSURE: 68 MMHG | WEIGHT: 204.94 LBS | SYSTOLIC BLOOD PRESSURE: 122 MMHG | BODY MASS INDEX: 27 KG/M2

## 2025-02-13 DIAGNOSIS — A41.9 SEPSIS DUE TO URINARY TRACT INFECTION (HCC): Primary | ICD-10-CM

## 2025-02-13 DIAGNOSIS — R34 DECREASED URINE OUTPUT: Primary | ICD-10-CM

## 2025-02-13 DIAGNOSIS — N30.01 ACUTE CYSTITIS WITH HEMATURIA: ICD-10-CM

## 2025-02-13 DIAGNOSIS — R35.1 NOCTURIA MORE THAN TWICE PER NIGHT: ICD-10-CM

## 2025-02-13 DIAGNOSIS — R50.9 FEVER, UNSPECIFIED FEVER CAUSE: ICD-10-CM

## 2025-02-13 DIAGNOSIS — N39.0 SEPSIS DUE TO URINARY TRACT INFECTION (HCC): Primary | ICD-10-CM

## 2025-02-13 PROBLEM — N17.9 AKI (ACUTE KIDNEY INJURY): Status: ACTIVE | Noted: 2025-02-13

## 2025-02-13 PROBLEM — R73.9 HYPERGLYCEMIA: Status: RESOLVED | Noted: 2025-02-13 | Resolved: 2025-02-13

## 2025-02-13 PROBLEM — N18.31 STAGE 3A CHRONIC KIDNEY DISEASE (HCC): Status: ACTIVE | Noted: 2025-02-13

## 2025-02-13 PROBLEM — D72.829 LEUKOCYTOSIS: Status: ACTIVE | Noted: 2025-02-13

## 2025-02-13 PROBLEM — D72.829 LEUKOCYTOSIS: Status: RESOLVED | Noted: 2025-02-13 | Resolved: 2025-02-13

## 2025-02-13 PROBLEM — N30.90 CYSTITIS: Status: ACTIVE | Noted: 2025-02-13

## 2025-02-13 PROBLEM — R73.9 HYPERGLYCEMIA: Status: ACTIVE | Noted: 2025-02-13

## 2025-02-13 PROBLEM — E87.1 HYPONATREMIA: Status: ACTIVE | Noted: 2025-02-13

## 2025-02-13 LAB
ALBUMIN SERPL-MCNC: 4.2 G/DL (ref 3.2–4.8)
ALBUMIN/GLOB SERPL: 1.4 {RATIO} (ref 1–2)
ALP LIVER SERPL-CCNC: 87 U/L
ALT SERPL-CCNC: 15 U/L
ANION GAP SERPL CALC-SCNC: 6 MMOL/L (ref 0–18)
AST SERPL-CCNC: 11 U/L (ref ?–34)
BASOPHILS # BLD AUTO: 0.04 X10(3) UL (ref 0–0.2)
BASOPHILS NFR BLD AUTO: 0.2 %
BILIRUB SERPL-MCNC: 1.6 MG/DL (ref 0.2–1.1)
BILIRUB UR QL STRIP.AUTO: NEGATIVE
BUN BLD-MCNC: 11 MG/DL (ref 9–23)
CALCIUM BLD-MCNC: 9 MG/DL (ref 8.7–10.6)
CHLORIDE SERPL-SCNC: 104 MMOL/L (ref 98–112)
CLARITY UR REFRACT.AUTO: CLEAR
CO2 SERPL-SCNC: 24 MMOL/L (ref 21–32)
COLOR UR AUTO: YELLOW
CREAT BLD-MCNC: 1.61 MG/DL
EGFRCR SERPLBLD CKD-EPI 2021: 49 ML/MIN/1.73M2 (ref 60–?)
EOSINOPHIL # BLD AUTO: 0.02 X10(3) UL (ref 0–0.7)
EOSINOPHIL NFR BLD AUTO: 0.1 %
ERYTHROCYTE [DISTWIDTH] IN BLOOD BY AUTOMATED COUNT: 16.1 %
FLUAV + FLUBV RNA SPEC NAA+PROBE: NEGATIVE
FLUAV + FLUBV RNA SPEC NAA+PROBE: NEGATIVE
GLOBULIN PLAS-MCNC: 2.9 G/DL (ref 2–3.5)
GLUCOSE BLD-MCNC: 108 MG/DL (ref 70–99)
GLUCOSE UR STRIP.AUTO-MCNC: NORMAL MG/DL
HCT VFR BLD AUTO: 47 %
HGB BLD-MCNC: 15.9 G/DL
IMM GRANULOCYTES # BLD AUTO: 0.15 X10(3) UL (ref 0–1)
IMM GRANULOCYTES NFR BLD: 0.7 %
LACTATE SERPL-SCNC: 1 MMOL/L (ref 0.5–2)
LEUKOCYTE ESTERASE UR QL STRIP.AUTO: 250
LYMPHOCYTES # BLD AUTO: 1.33 X10(3) UL (ref 1–4)
LYMPHOCYTES NFR BLD AUTO: 6.2 %
MCH RBC QN AUTO: 28.6 PG (ref 26–34)
MCHC RBC AUTO-ENTMCNC: 33.8 G/DL (ref 31–37)
MCV RBC AUTO: 84.7 FL
MONOCYTES # BLD AUTO: 2.38 X10(3) UL (ref 0.1–1)
MONOCYTES NFR BLD AUTO: 11.2 %
NEUTROPHILS # BLD AUTO: 17.39 X10 (3) UL (ref 1.5–7.7)
NEUTROPHILS # BLD AUTO: 17.39 X10(3) UL (ref 1.5–7.7)
NEUTROPHILS NFR BLD AUTO: 81.6 %
NITRITE UR QL STRIP.AUTO: NEGATIVE
OSMOLALITY SERPL CALC.SUM OF ELEC: 278 MOSM/KG (ref 275–295)
PH UR STRIP.AUTO: 6.5 [PH] (ref 5–8)
PLATELET # BLD AUTO: 207 10(3)UL (ref 150–450)
POTASSIUM SERPL-SCNC: 4.1 MMOL/L (ref 3.5–5.1)
PROT SERPL-MCNC: 7.1 G/DL (ref 5.7–8.2)
PROT UR STRIP.AUTO-MCNC: 30 MG/DL
RBC # BLD AUTO: 5.55 X10(6)UL
RSV RNA SPEC NAA+PROBE: NEGATIVE
SARS-COV-2 RNA RESP QL NAA+PROBE: NOT DETECTED
SODIUM SERPL-SCNC: 134 MMOL/L (ref 136–145)
SP GR UR STRIP.AUTO: 1.02 (ref 1–1.03)
UROBILINOGEN UR STRIP.AUTO-MCNC: 2 MG/DL
WBC # BLD AUTO: 21.3 X10(3) UL (ref 4–11)

## 2025-02-13 PROCEDURE — 74177 CT ABD & PELVIS W/CONTRAST: CPT | Performed by: EMERGENCY MEDICINE

## 2025-02-13 PROCEDURE — 99223 1ST HOSP IP/OBS HIGH 75: CPT | Performed by: HOSPITALIST

## 2025-02-13 PROCEDURE — 99215 OFFICE O/P EST HI 40 MIN: CPT

## 2025-02-13 RX ORDER — PROCHLORPERAZINE EDISYLATE 5 MG/ML
5 INJECTION INTRAMUSCULAR; INTRAVENOUS EVERY 8 HOURS PRN
Status: DISCONTINUED | OUTPATIENT
Start: 2025-02-13 | End: 2025-02-15

## 2025-02-13 RX ORDER — ONDANSETRON 2 MG/ML
4 INJECTION INTRAMUSCULAR; INTRAVENOUS EVERY 6 HOURS PRN
Status: DISCONTINUED | OUTPATIENT
Start: 2025-02-13 | End: 2025-02-15

## 2025-02-13 RX ORDER — SENNOSIDES 8.6 MG
17.2 TABLET ORAL NIGHTLY PRN
Status: DISCONTINUED | OUTPATIENT
Start: 2025-02-13 | End: 2025-02-15

## 2025-02-13 RX ORDER — HYDROCODONE BITARTRATE AND ACETAMINOPHEN 5; 325 MG/1; MG/1
2 TABLET ORAL EVERY 4 HOURS PRN
Status: DISCONTINUED | OUTPATIENT
Start: 2025-02-13 | End: 2025-02-15

## 2025-02-13 RX ORDER — SODIUM CHLORIDE, SODIUM LACTATE, POTASSIUM CHLORIDE, CALCIUM CHLORIDE 600; 310; 30; 20 MG/100ML; MG/100ML; MG/100ML; MG/100ML
INJECTION, SOLUTION INTRAVENOUS CONTINUOUS
Status: DISCONTINUED | OUTPATIENT
Start: 2025-02-13 | End: 2025-02-13

## 2025-02-13 RX ORDER — ACETAMINOPHEN 500 MG
1000 TABLET ORAL EVERY 4 HOURS PRN
Status: DISCONTINUED | OUTPATIENT
Start: 2025-02-13 | End: 2025-02-15

## 2025-02-13 RX ORDER — FOLIC ACID 1 MG/1
1 TABLET ORAL DAILY
COMMUNITY

## 2025-02-13 RX ORDER — SODIUM PHOSPHATE, DIBASIC AND SODIUM PHOSPHATE, MONOBASIC 7; 19 G/230ML; G/230ML
1 ENEMA RECTAL ONCE AS NEEDED
Status: DISCONTINUED | OUTPATIENT
Start: 2025-02-13 | End: 2025-02-15

## 2025-02-13 RX ORDER — ENOXAPARIN SODIUM 100 MG/ML
40 INJECTION SUBCUTANEOUS DAILY
Status: DISCONTINUED | OUTPATIENT
Start: 2025-02-13 | End: 2025-02-15

## 2025-02-13 RX ORDER — METHOTREXATE 2.5 MG/1
12.5 TABLET ORAL WEEKLY
COMMUNITY

## 2025-02-13 RX ORDER — ACETAMINOPHEN 325 MG/1
650 TABLET ORAL EVERY 4 HOURS PRN
Status: DISCONTINUED | OUTPATIENT
Start: 2025-02-13 | End: 2025-02-15

## 2025-02-13 RX ORDER — TAMSULOSIN HYDROCHLORIDE 0.4 MG/1
0.4 CAPSULE ORAL DAILY
Status: DISCONTINUED | OUTPATIENT
Start: 2025-02-13 | End: 2025-02-15

## 2025-02-13 RX ORDER — TESTOSTERONE CYPIONATE 200 MG/ML
0.5 VIAL (ML) INTRAMUSCULAR
COMMUNITY

## 2025-02-13 RX ORDER — BISACODYL 10 MG
10 SUPPOSITORY, RECTAL RECTAL
Status: DISCONTINUED | OUTPATIENT
Start: 2025-02-13 | End: 2025-02-15

## 2025-02-13 RX ORDER — SODIUM CHLORIDE 9 MG/ML
INJECTION, SOLUTION INTRAVENOUS CONTINUOUS
Status: ACTIVE | OUTPATIENT
Start: 2025-02-13 | End: 2025-02-14

## 2025-02-13 RX ORDER — HYDROCODONE BITARTRATE AND ACETAMINOPHEN 5; 325 MG/1; MG/1
1 TABLET ORAL EVERY 4 HOURS PRN
Status: DISCONTINUED | OUTPATIENT
Start: 2025-02-13 | End: 2025-02-15

## 2025-02-13 RX ORDER — POLYETHYLENE GLYCOL 3350 17 G/17G
17 POWDER, FOR SOLUTION ORAL DAILY PRN
Status: DISCONTINUED | OUTPATIENT
Start: 2025-02-13 | End: 2025-02-15

## 2025-02-13 RX ORDER — ECHINACEA PURPUREA EXTRACT 125 MG
1 TABLET ORAL
Status: DISCONTINUED | OUTPATIENT
Start: 2025-02-13 | End: 2025-02-15

## 2025-02-13 NOTE — ED QUICK NOTES
Received pt to [facility]. Pt updated on POC. Pt is comfortable at this time. Pt given urinal due to urinary urgency. Pt denies pain at this time.

## 2025-02-13 NOTE — ED INITIAL ASSESSMENT (HPI)
UTI diagnosed yesterday rx for cipro given. Patient is 3 doses in. Patient notes decreased urination. Fever reached 104.5 this morning. Patient took motrin. Generalized body aches and fatigue. Denies any pelvic pressure. Denies any nausea or vomiting.

## 2025-02-13 NOTE — PLAN OF CARE
NURSING ADMISSION NOTE      Patient admitted via Cart  Oriented to room.  Safety precautions initiated.  Bed in low position.  Call light in reach.    Pt received alert and oriented x4, c/o pain with urination. Navigator completed. Steady gait to bathroom. All questions answered at this time.

## 2025-02-13 NOTE — PROGRESS NOTES
Jackson Melgar is a 59 year old male who presents to [facility] with c/o fever (t max 104) and decreased urine output. Patient was seen by PCP (02/12/2025) and dx with Acute Cystitis with Hematuria and prescribed Cipro. Patient has completed 3 doses but reports worsening symptoms. Patient with fever, chills, and joint pain over the last 24 hours. Patient states he has had approximately 1.5 L of oral fluids with little to no output (estimates a couple of tablespoons). Discussed with patient severity of symptoms and the inability to rule out malignant etiologies associated in the [facility] setting. Limitations of the [facility] explained to patient regarding ability to perform required and necessary evaluation and treatments, such as: radiological imaging, EKG testing, laboratory testing, and IVF/medication administration.    Accompanied by: self  After triage, higher acuity of care was recommended to Jackson Melgar today.   Rationale: Due to limitations of the [facility], patient is advised to go to Emergency Room for further evaluation and treatment.  Site recommendation: [facility]  Patient verbalized understanding of rationale for further evaluation and was stable upon discharge.  Vitals:    02/13/25 0824   BP: 122/68   Pulse: 87   Resp: 18   Temp: 98.6 °F (37 °C)   TempSrc: Oral   SpO2: 98%   Weight: 204 lb 14.7 oz (92.9 kg)      Recent Results (from the past 72 hours)   URINALYSIS, AUTO, W/O SCOPE    Collection Time: 02/12/25  1:21 PM   Result Value Ref Range    Glucose Urine Negative Negative mg/dL    Bilirubin Urine Negative Negative    Ketones, UA Negative Negative - Trace mg/dL    Spec Gravity 1.020 1.005 - 1.030    Blood Urine Moderate (A) Negative    PH Urine 7.0 5.0 - 8.0    Protein Urine 100 (A) Negative - Trace mg/dL    Urobilinogen Urine 0.2 0.2 - 1.0 mg/dL    Nitrite Urine Positive (A) Negative    Leukocyte Esterase Urine Large (A) Negative    APPEARANCE Cloudy Clear    Color Urine Yellow Yellow    Multistix Lot# [id number]  Numeric    Multistix Expiration Date 8/31/2025 Date

## 2025-02-13 NOTE — H&P
[facility]     Jackson Melgar Patient Status:  Inpatient    1965 MRN [id number]   Location [facility] 4NW-A Attending Ismael Carpenter MD   Hosp Day # 0 PCP Carissa Boyd MD     Chief Complaint: dysuria    Subjective:    History of Present Illness:     Jackson Melgar is a 59 year old male with a past medical history of gastric reflux, psoriasis and arthritis.  He was recently diagnosed with anorectal infection and was started on ciprofloxacin.  He is continue to have fevers and chills and several came to the emergency room.  CT of the abdomen emergency room shows enlarged prostate but no other acute abnormalities.  He was started on IV antibiotics.    History/Other:    Past Medical History:  Past Medical History:    Allergic rhinitis    Arthritis    Esophageal reflux    Hemorrhoids    Pain in joints    Psoriasis    Psoriatic arthritis (HCC)    Sleep disturbance    Stress    Tremor    Visual impairment    glasses     Past Surgical History:   Past Surgical History:   Procedure Laterality Date    Biopsy of thyroid,percut  2009    Colonoscopy  2009    complete    Colonoscopy  ,2010    x2 Dr. Cast     Colonoscopy      Forearm/wrist surgery unlisted Bilateral left-2017 and right---Dr. Landeros    wrist ganglion cyst excision    Hemorrhoidectomy      Hemorrhoidectomy,int/ext,simple      Hernia repair  2024    Hernia surgery  2024    Other surgical history      Upper Gastrointestinal Endoscopy    Tonsillectomy  10/2018    Vasectomy  1999      Family History:   Family History   Problem Relation Age of Onset    Alcohol and Other Disorders Associated Father     Alcohol and Other Disorders Associated Mother     Breast Cancer Mother     Cancer Mother     Colon Cancer Maternal Grandmother     Cancer Maternal Grandmother         Stomach    Cancer Maternal Grandfather         Stomach    Diabetes Sister     Obesity Sister     Heart Attack Brother      Heart Disorder Brother         Heart attack     Social History:    reports that he has never smoked. He has never used smokeless tobacco. He reports current alcohol use of about 4.0 standard drinks of alcohol per week. He reports that he does not use drugs.     Allergies: Allergies[1]    Medications:  Medications Ordered Prior to Encounter[2]    Review of Systems:   A comprehensive review of systems was completed.    Pertinent positives and negatives noted in the HPI.    Objective:   Physical Exam:    /78 (BP Location: Right arm)   Pulse 79   Temp 98.9 °F (37.2 °C) (Oral)   Resp 18   Wt 200 lb (90.7 kg)   SpO2 100%   BMI 26.51 kg/m²   General: No acute distress, Alert  Respiratory: No rhonchi, no wheezes  Cardiovascular: S1, S2. Regular rate and rhythm  Abdomen: Soft, Non-tender, non-distended, positive bowel sounds  Neuro: No new focal deficits  Extremities: No edema      Results:    Labs:      Labs Last 24 Hours:    Recent Labs   Lab 02/13/25  0935   RBC 5.55   HGB 15.9   HCT 47.0   MCV 84.7   MCH 28.6   MCHC 33.8   RDW 16.1   NEPRELIM 17.39*   WBC 21.3*   .0       Recent Labs   Lab 02/13/25  0935   *   BUN 11   CREATSERUM 1.61*   EGFRCR 49*   CA 9.0   ALB 4.2   *   K 4.1      CO2 24.0   ALKPHO 87   AST 11   ALT 15   BILT 1.6*   TP 7.1       Estimated Glomerular Filtration Rate: 48.7 mL/min/1.73m2 (A) (by CKD-EPI based on SCr of 1.61 mg/dL (H)).    No results found for: \"PT\", \"INR\"    No results for input(s): \"TROP\", \"TROPHS\", \"CK\" in the last 168 hours.    No results for input(s): \"TROP\", \"PBNP\" in the last 168 hours.    No results for input(s): \"PCT\" in the last 168 hours.    Imaging: Imaging data reviewed in Epic.    Assessment & Plan:      Assessment & Plan  Sepsis  IVF  LA normal  BCX pending  Cystitis  IV ABX  PO cipro PTA  UCX  ANGELINE done in ED neg for tenderness  Hyponatremia  Gentle fluids  Psoriatic arthritis (HCC)    Stage 3a chronic kidney disease (HCC)  Cr  stable  Benign prostatic hyperplasia with nocturia  Considering flomax         Plan of care discussed with ED doctor    Ismael Carpenter MD    Supplementary Documentation:     The 21st Century Cures Act makes medical notes like these available to patients in the interest of transparency. Please be advised this is a medical document. Medical documents are intended to carry relevant information, facts as evident, and the clinical opinion of the practitioner. The medical note is intended as peer to peer communication and may appear blunt or direct. It is written in medical language and may contain abbreviations or verbiage that are unfamiliar.                                       [1] No Known Allergies  [2]   No current facility-administered medications on file prior to encounter.     Current Outpatient Medications on File Prior to Encounter   Medication Sig Dispense Refill    BREMELANOTIDE ACETATE SC Inject 0.5 mL into the skin every third day as needed. Bremelanotide 10 MG per 2 mL subcutaneous injection      folic acid 1 MG Oral Tab Take 1 tablet (1 mg total) by mouth daily.      Tirzepatide 12.5 MG/0.5ML Subcutaneous Solution Auto-injector Inject 12.5 mg into the skin once a week.      Testosterone Cypionate 200 MG/ML Injection Solution Inject 100 mg into the muscle twice a week.      HCG IJ Inject 24 Units as directed 4 (four) times a week.      methotrexate 2.5 MG Oral Tab Take 5 tablets (12.5 mg total) by mouth once a week.      ciprofloxacin 500 MG Oral Tab Take 1 tablet (500 mg total) by mouth 2 (two) times daily. 14 tablet 0    hydrocortisone (PROCTO-MED HC) 2.5 % External Cream Place 1 Application rectally 2 (two) times daily as needed for Hemorrhoids. 28 g 2    Ubiquinol (QUNOL COQ10/UBIQUINOL/MAREK) 100 MG Oral Cap Take 1 capsule by mouth daily.      zonisamide (ZONEGRAN) 25 MG Oral Cap Take 3 capsules qhs (Patient taking differently: Take 3 capsules (75 mg total) by mouth every morning. Pt reported taking  in the morning) 270 capsule 3    Adalimumab (HUMIRA PEN) 40 MG/0.4ML Subcutaneous Pen-injector Kit Inject 40 mg into the skin every 14 (fourteen) days. 6 each 3    Lansoprazole 15 MG Oral Tablet Delayed Release Dispersible Take 1 tablet (15 mg total) by mouth daily as needed.      finasteride 1 MG Oral Tab Take 1 tablet (1 mg total) by mouth daily.      Multiple Vitamin (MULTI-DAY VITAMINS) Oral Tab Take 1 tablet by mouth daily.      cetirizine 10 MG Oral Tab Take 1 tablet (10 mg total) by mouth daily.      Omega-3 Fatty Acids (FISH OIL OR) Take 1 capsule by mouth every morning.      Cholecalciferol (VITAMIN D) 2000 UNITS Oral Cap Take 1 capsule (2,000 Units total) by mouth daily.      Tadalafil 20 MG Oral Tab Take 1 tablet (20 mg total) by mouth daily as needed for Erectile Dysfunction. 30 tablet 3    tamsulosin 0.4 MG Oral Cap Take 1 capsule (0.4 mg total) by mouth daily. (Patient not taking: Reported on 2/13/2025) 90 capsule 1

## 2025-02-13 NOTE — ED PROVIDER NOTES
Patient Seen in: [facility] Emergency Department      History     Chief Complaint   Patient presents with    Urinary Symptoms     fever (t max 104), dysuria, and decreased urinary output--started on Cipro yesterday by PCP; completed 3 doses with worsening symptoms.     Stated Complaint: uti    Subjective:   HPI      59-year-old gentleman history of psoriatic arthritis, here for evaluation of discomfort urination fevers.  Symptoms started 2 days ago saw his primary doctor yesterday was started on ciprofloxacin, has had 3 doses, states last night had a fever to 104, this morning temp of 100, did take ibuprofen earlier today.  Denies any vomiting, does continue to have increased urinary frequency, sensation of incomplete emptying pain with urination feels like he is drinking lots of fluids but only urinating a few teaspoons.  Denies any cough or cold symptoms nasal congestion does report some lower abdominal discomfort.  Even when feeling well does wake up frequently at night to urinate, was started on Flomax but states not taking currently.      Objective:     Past Medical History:    Allergic rhinitis    Arthritis    Esophageal reflux    Hemorrhoids    Pain in joints    Psoriasis    Psoriatic arthritis (HCC)    Sleep disturbance    Stress    Tremor    Visual impairment    glasses              Past Surgical History:   Procedure Laterality Date    Biopsy of thyroid,percut  2009    Colonoscopy  2009    complete    Colonoscopy  2014,2010    x2 Dr. Cast     Colonoscopy      Forearm/wrist surgery unlisted Bilateral left-4/2017 and right-2011--Dr. Landeros    wrist ganglion cyst excision    Hemorrhoidectomy      Hemorrhoidectomy,int/ext,simple      Hernia repair  02/19/2024    Hernia surgery  February 19, 2024    Other surgical history  2004    Upper Gastrointestinal Endoscopy    Tonsillectomy  10/2018    Vasectomy  01/01/1999                Social History     Socioeconomic History    Marital status:    Tobacco  Use    Smoking status: Never    Smokeless tobacco: Never   Vaping Use    Vaping status: Never Used   Substance and Sexual Activity    Alcohol use: Yes     Alcohol/week: 4.0 standard drinks of alcohol     Types: 2 Cans of beer, 2 Standard drinks or equivalent per week     Comment: Sporadic    Drug use: Never   Other Topics Concern    Caffeine Concern No    Stress Concern No    Weight Concern Yes     Comment: Becoming less after dropping 30 lbs    Special Diet No    Exercise Yes     Comment: Walking and resistance training    Seat Belt Yes                  Physical Exam     ED Triage Vitals [02/13/25 0914]   /71   Pulse 84   Resp 20   Temp 97.8 °F (36.6 °C)   Temp src Oral   SpO2 100 %   O2 Device None (Room air)       Current Vitals:   Vital Signs  BP: 118/71  Pulse: 84  Resp: 20  Temp: 97.8 °F (36.6 °C)  Temp src: Oral    Oxygen Therapy  SpO2: 100 %  O2 Device: None (Room air)        Physical Exam      Physical Exam  Vitals signs and nursing note reviewed.   General:  Patient laying supine in the bed in no acute distress  Head: Normocephalic and atraumatic.   HEENT:  Mucous membranes are moist.   Cardiovascular:  Normal rate and regular rhythm.  No Edema  Pulmonary:  Pulmonary effort is normal.  Normal breath sounds. No wheezing, rhonchi or rales.   Abdominal: Soft, there is some suprapubic and right lower quadrant tenderness, abdomen is nondistended, there is no guarding or rebound tenderness.  Skin: Warm and dry  Neurological: Awake alert, speech is normal        ED Course     Labs Reviewed   URINALYSIS WITH CULTURE REFLEX - Abnormal; Notable for the following components:       Result Value    Ketones Urine Trace (*)     Blood Urine 1+ (*)     Protein Urine 30 (*)     Urobilinogen Urine 2 (*)     Leukocyte Esterase Urine 250 (*)     WBC Urine 21-50 (*)     RBC Urine 6-10 (*)     Squamous Epi. Cells Few (*)     All other components within normal limits   CBC WITH DIFFERENTIAL WITH PLATELET - Abnormal; Notable  for the following components:    WBC 21.3 (*)     Neutrophil Absolute Prelim 17.39 (*)     Neutrophil Absolute 17.39 (*)     Monocyte Absolute 2.38 (*)     All other components within normal limits   COMP METABOLIC PANEL (14) - Abnormal; Notable for the following components:    Glucose 108 (*)     Sodium 134 (*)     Creatinine 1.61 (*)     eGFR-Cr 49 (*)     Bilirubin, Total 1.6 (*)     All other components within normal limits   LACTIC ACID, PLASMA - Normal   SARS-COV-2/FLU A AND B/RSV BY PCR (GENEXPERT) - Normal    Narrative:     This test is intended for the qualitative detection and differentiation of SARS-CoV-2, influenza A, influenza B, and respiratory syncytial virus (RSV) viral RNA in nasopharyngeal or nares swabs from individuals suspected of respiratory viral infection consistent with COVID-19 by their healthcare provider. Signs and symptoms of respiratory viral infection due to SARS-CoV-2, influenza, and RSV can be similar.    Test performed using the Xpert Xpress SARS-CoV-2/FLU/RSV (real time RT-PCR)  assay on the [employer]pert instrument, [employer] [place] [address].   This test is being used under the Food and Drug Administration's Emergency Use Authorization.    The authorized Fact Sheet for Healthcare Providers for this assay is available upon request from the laboratory.   SCAN SLIDE   RAINBOW DRAW LAVENDER   RAINBOW DRAW LIGHT GREEN   RAINBOW DRAW BLUE   BLOOD CULTURE   BLOOD CULTURE   URINE CULTURE, ROUTINE                   MDM      This is a 59-year-old gentleman here for evaluation of fevers, discomfort urination, some lower abdominal discomfort.  Has had 3 doses of outpatient antibiotics, still with fevers.  Differential includes incompletely treated UTI, resistant UTI, prostatic abscess, prostatitis.  Will check basic labs, obtain CT abdomen   pelvis with contrast, reevaluate.    CT abdomen pelvis with enlarged prostate otherwise no acute abnormalities.  Given patient has had 3 dose of  outpatient antibiotics, still with fevers, leukocytosis, discussed with patient and wife at bedside, they are agreeable to admission for inpatient IV antibiotics, patient covered with IV ceftriaxone here maintained on amply stable at this time.  [facility]    I independently viewed and interpreted the following imaging: CT abdomen pelvis without evidence of bowel obstruction    Bedside ultrasound shows no urinary retention.    Outpatient urinalysis from 2/11 was reviewed with nitrate positive    Admission disposition: 2/13/2025 12:02 PM           Medical Decision Making      Disposition and Plan     Clinical Impression:  1. Sepsis due to urinary tract infection (HCC)         Disposition:  Admit  2/13/2025 12:02 pm    Follow-up:  No follow-up provider specified.        Medications Prescribed:  Current Discharge Medication List              Supplementary Documentation:     [facility]   part of [facility]      Sepsis Reassessment Note    /71   Pulse 84   Temp 97.8 °F (36.6 °C) (Oral)   Resp 20   Wt 90.7 kg   SpO2 100%   BMI 26.51 kg/m²      I completed the sepsis reassessment at 1210    Cardiac:  Regularity: Regular  Rate: Normal  Heart Sounds: S1,S2    Lungs:   Right: Clear  Left: Clear    Peripheral Pulses:  Radial: Right 1+ or Left 1+      Capillary Refill:  <3 Secs    Skin:  Temp/Moisture: Warm and Dry  Color: Normal      Alessio DALY Rascon MD  2/13/2025  12:11 PM       Hospital Problems       Present on Admission  Date Reviewed: 2/13/2025            ICD-10-CM Noted POA    * (Principal) Sepsis due to urinary tract infection (HCC) A41.9, N39.0 2/13/2025 Unknown    Hyperglycemia R73.9 2/13/2025 Yes    Hyponatremia E87.1 2/13/2025 Yes    Leukocytosis D72.829 2/13/2025 Yes                             Sepsis dx documented at 2/13/2025 12:01 PM

## 2025-02-13 NOTE — ED QUICK NOTES
Orders for admission, patient is aware of plan and ready to go upstairs. Any questions, please call ED RN Enrique at extension [phone].     Patient Covid vaccination status: Fully vaccinated     COVID Test Ordered in ED: SARS-CoV-2/Flu A and B/RSV by PCR (GeneXpert)    COVID Suspicion at Admission: N/A    Running Infusions:  None    Mental Status/LOC at time of transport: a/o x 4    Other pertinent information:   CIWA score: N/A   NIH score:  N/A

## 2025-02-14 LAB
ANION GAP SERPL CALC-SCNC: 7 MMOL/L (ref 0–18)
BASOPHILS # BLD AUTO: 0.02 X10(3) UL (ref 0–0.2)
BASOPHILS NFR BLD AUTO: 0.2 %
BUN BLD-MCNC: 13 MG/DL (ref 9–23)
CALCIUM BLD-MCNC: 8.9 MG/DL (ref 8.7–10.6)
CHLORIDE SERPL-SCNC: 107 MMOL/L (ref 98–112)
CO2 SERPL-SCNC: 25 MMOL/L (ref 21–32)
CREAT BLD-MCNC: 1.61 MG/DL
EGFRCR SERPLBLD CKD-EPI 2021: 49 ML/MIN/1.73M2 (ref 60–?)
EOSINOPHIL # BLD AUTO: 0.23 X10(3) UL (ref 0–0.7)
EOSINOPHIL NFR BLD AUTO: 2.2 %
ERYTHROCYTE [DISTWIDTH] IN BLOOD BY AUTOMATED COUNT: 15.9 %
GLUCOSE BLD-MCNC: 98 MG/DL (ref 70–99)
HCT VFR BLD AUTO: 43 %
HGB BLD-MCNC: 14.1 G/DL
IMM GRANULOCYTES # BLD AUTO: 0.03 X10(3) UL (ref 0–1)
IMM GRANULOCYTES NFR BLD: 0.3 %
LYMPHOCYTES # BLD AUTO: 2.31 X10(3) UL (ref 1–4)
LYMPHOCYTES NFR BLD AUTO: 21.6 %
MAGNESIUM SERPL-MCNC: 2 MG/DL (ref 1.6–2.6)
MCH RBC QN AUTO: 28.7 PG (ref 26–34)
MCHC RBC AUTO-ENTMCNC: 32.8 G/DL (ref 31–37)
MCV RBC AUTO: 87.6 FL
MONOCYTES # BLD AUTO: 1.11 X10(3) UL (ref 0.1–1)
MONOCYTES NFR BLD AUTO: 10.4 %
NEUTROPHILS # BLD AUTO: 6.99 X10 (3) UL (ref 1.5–7.7)
NEUTROPHILS # BLD AUTO: 6.99 X10(3) UL (ref 1.5–7.7)
NEUTROPHILS NFR BLD AUTO: 65.3 %
OSMOLALITY SERPL CALC.SUM OF ELEC: 288 MOSM/KG (ref 275–295)
PLATELET # BLD AUTO: 189 10(3)UL (ref 150–450)
POTASSIUM SERPL-SCNC: 4.3 MMOL/L (ref 3.5–5.1)
RBC # BLD AUTO: 4.91 X10(6)UL
SODIUM SERPL-SCNC: 139 MMOL/L (ref 136–145)
WBC # BLD AUTO: 10.7 X10(3) UL (ref 4–11)

## 2025-02-14 PROCEDURE — 99232 SBSQ HOSP IP/OBS MODERATE 35: CPT | Performed by: HOSPITALIST

## 2025-02-14 NOTE — PLAN OF CARE
A/o x4. Afebrile overnight. Still c/o urgency, increased frequency urinating. Reports he was prescribed flomax but stopped taking it 6 months ago. MD notified. Flomax ordered. Prn norco given for pain with some relief.  0605- Reports improving in urinary symptoms this morning.   Problem: GENITOURINARY - ADULT  Goal: Absence of urinary retention  Description: INTERVENTIONS:  - Assess patient’s ability to void and empty bladder  - Monitor intake/output and perform bladder scan as needed  - Follow urinary retention protocol/standard of care  - Consider collaborating with pharmacy to review patient's medication profile  - Implement strategies to promote bladder emptying  Outcome: Progressing

## 2025-02-14 NOTE — PLAN OF CARE
Alert and oriented, afebrile, still experiencing burning and pain with urination and hard to start stream, on IV antibiotics.  Up ambulating in the room, activity is tolerated.  Medicated with Norco for pain 6/10,  with good relief.  Drinking plenty of fluids, denies any blood in the urine.  Problem: GENITOURINARY - ADULT  Goal: Absence of urinary retention  Description: INTERVENTIONS:  - Assess patient’s ability to void and empty bladder  - Monitor intake/output and perform bladder scan as needed  - Follow urinary retention protocol/standard of care  - Consider collaborating with pharmacy to review patient's medication profile  - Implement strategies to promote bladder emptying  Outcome: Progressing

## 2025-02-14 NOTE — PROGRESS NOTES
[facility]   part of [facility]     Hospitalist Progress Note     Jackson Melgar Patient Status:  Observation    1965 MRN [id number]   [facility] 4NW-A Attending Ismael Carpenter MD   Hosp Day # 0 PCP Carissa Boyd MD     Chief Complaint: dysuria    Subjective:     Patient feels better currently.  Had dysuria overnight and this morning    Objective:    Review of Systems:   ROS completed; pertinent positive and negatives stated in subjective.    Vital signs:  Temp:  [97.7 °F (36.5 °C)-98.5 °F (36.9 °C)] 97.7 °F (36.5 °C)  Pulse:  [60-77] 60  Resp:  [20] 20  BP: (112-140)/(69-70) 112/70  SpO2:  [100 %] 100 %    Physical Exam:    General: No acute distress  Respiratory: Clear to auscultation bilaterally  Cardiovascular: S1, S2.  Abdomen: Soft  Neuro: No new focal deficits      Diagnostic Data:    Labs:  Recent Labs   Lab 25  0935 25  0708   WBC 21.3* 10.7   HGB 15.9 14.1   MCV 84.7 87.6   .0 189.0       Recent Labs   Lab 25  0935 25  0708   * 98   BUN 11 13   CREATSERUM 1.61* 1.61*   CA 9.0 8.9   ALB 4.2  --    * 139   K 4.1 4.3    107   CO2 24.0 25.0   ALKPHO 87  --    AST 11  --    ALT 15  --    BILT 1.6*  --    TP 7.1  --        Estimated Creatinine Clearance: 54.9 mL/min (A) (based on SCr of 1.61 mg/dL (H)).     No results for input(s): \"TROP\", \"TROPHS\", \"CK\" in the last 168 hours.    No results for input(s): \"PTP\", \"INR\" in the last 168 hours.           Imaging: Imaging data reviewed in Epic.    Medications:    enoxaparin  40 mg Subcutaneous Daily    piperacillin-tazobactam  3.375 g Intravenous Q8H    tamsulosin  0.4 mg Oral Daily         Assessment & Plan  Sepsis  Resolving  Cystitis  IV ABX  PO cipro PTA  UCX: E. coli  ANGELINE done in ED neg for tenderness  Hyponatremia  resolved  Psoriatic arthritis (HCC)    Stage 3a chronic kidney disease (HCC)  Cr stable  Benign prostatic hyperplasia with nocturia  flomax added      Dispo:  UCX:  E. Coli.  May have just needed some IV ABX though will keep until urine sensitivities are back, especially as pt is on humira as OP.  Dysuria now much improved. Can add pyridium if returns    Ismael Carpenter MD    Supplementary Documentation:     Quality:    DVT Mechanical Prophylaxis:     Early ambuation  DVT Pharmacologic Prophylaxis   Medication    enoxaparin (Lovenox) 40 MG/0.4ML SUBQ injection 40 mg                Code Status: Full Code  Grewal: No urinary catheter in place  Grewal Duration (in days):   Central line:    MIRIAM:       Discharge is dependent on: clinical stability  At this point Mr. Melgar is expected to be discharge to: home    The 21st Century Cures Act makes medical notes like these available to patients in the interest of transparency. Please be advised this is a medical document. Medical documents are intended to carry relevant information, facts as evident, and the clinical opinion of the practitioner. The medical note is intended as peer to peer communication and may appear blunt or direct. It is written in medical language and may contain abbreviations or verbiage that are unfamiliar.

## 2025-02-15 VITALS
RESPIRATION RATE: 22 BRPM | BODY MASS INDEX: 27 KG/M2 | HEART RATE: 62 BPM | OXYGEN SATURATION: 98 % | TEMPERATURE: 99 F | DIASTOLIC BLOOD PRESSURE: 76 MMHG | WEIGHT: 200 LBS | SYSTOLIC BLOOD PRESSURE: 151 MMHG

## 2025-02-15 PROCEDURE — 99239 HOSP IP/OBS DSCHRG MGMT >30: CPT | Performed by: STUDENT IN AN ORGANIZED HEALTH CARE EDUCATION/TRAINING PROGRAM

## 2025-02-15 RX ORDER — TAMSULOSIN HYDROCHLORIDE 0.4 MG/1
0.4 CAPSULE ORAL DAILY
Qty: 30 CAPSULE | Refills: 0 | Status: SHIPPED | OUTPATIENT
Start: 2025-02-15 | End: 2025-03-17

## 2025-02-15 RX ORDER — CEFADROXIL 500 MG/1
500 CAPSULE ORAL 2 TIMES DAILY
Qty: 14 CAPSULE | Refills: 0 | Status: SHIPPED | OUTPATIENT
Start: 2025-02-15 | End: 2025-02-22

## 2025-02-15 NOTE — PROGRESS NOTES
[facility]   part of [facility]     Hospitalist Progress Note     Jackson Melgar Patient Status:  Observation    1965 MRN [id number]   Location [facility] 4NW-A Attending Selvin Avalos, *   Hosp Day # 0 PCP Carissa Boyd MD     Chief Complaint: Dysuria     Subjective:      - Now remains afebrile, dysuria improved, still having mild urinary urgency but also improved since admission   - Denies flank pain, chills, abd pain, n/v   - States when he had ANGELINE on admission, did not have significant pain/tenderness on palpation of prostate   - Denies any preceding procedures     Objective:    Review of Systems:   A comprehensive review of systems was completed; pertinent positive and negatives stated in subjective.    Vital signs:  Temp:  [97.7 °F (36.5 °C)-98.5 °F (36.9 °C)] 98.5 °F (36.9 °C)  Pulse:  [58-77] 62  Resp:  [20-22] 22  BP: (112-151)/(70-76) 151/76  SpO2:  [98 %-100 %] 98 %    Physical Exam:    General: No acute distress  Respiratory: no wheezes, no rhonchi  Cardiovascular: S1, S2, regular rate and rhythm  Abdomen: Soft, Non-tender, non-distended, positive bowel sounds  Neuro: No new focal deficits.   Extremities: no edema    Diagnostic Data:    Labs:  Recent Labs   Lab 25  0935 25  0708   WBC 21.3* 10.7   HGB 15.9 14.1   MCV 84.7 87.6   .0 189.0       Recent Labs   Lab 25  0935 25  0708   * 98   BUN 11 13   CREATSERUM 1.61* 1.61*   CA 9.0 8.9   ALB 4.2  --    * 139   K 4.1 4.3    107   CO2 24.0 25.0   ALKPHO 87  --    AST 11  --    ALT 15  --    BILT 1.6*  --    TP 7.1  --        Estimated Creatinine Clearance: 54.9 mL/min (A) (based on SCr of 1.61 mg/dL (H)).    No results for input(s): \"TROP\", \"TROPHS\", \"CK\" in the last 168 hours.    No results for input(s): \"PTP\", \"INR\" in the last 168 hours.               Microbiology    Hospital Encounter on 25   1. Blood Culture     Status: None (Preliminary result)    Collection  Time: 02/13/25 10:16 AM    Specimen: Blood,peripheral   Result Value Ref Range    Blood Culture Result No Growth 1 Day N/A   2. Urine Culture, Routine     Status: None (Preliminary result)    Collection Time: 02/13/25  9:35 AM    Specimen: Urine, clean catch   Result Value Ref Range    Urine Culture No Growth at <18 hours N/A         Imaging: Reviewed in Epic.    Medications:    enoxaparin  40 mg Subcutaneous Daily    piperacillin-tazobactam  3.375 g Intravenous Q8H    tamsulosin  0.4 mg Oral Daily       Assessment & Plan:      # Sepsis  # Cystitis    - IV abx continued, transition to PO at DC   - Ucx with E coli, pan-susceptible on 2/12; repeat no growth      # Hyponatremia, resolved  # Psoriatic arthiritis  # BPH - flomax     DC home today     Selvin Avalos MD    Supplementary Documentation:     Quality:  DVT Mechanical Prophylaxis:     Early ambuation  DVT Pharmacologic Prophylaxis   Medication    enoxaparin (Lovenox) 40 MG/0.4ML SUBQ injection 40 mg                Code Status: Full Code  Grewal: No urinary catheter in place  Grewal Duration (in days):   Central line:    MIRIAM:     The 21st Century Cures Act makes medical notes like these available to patients in the interest of transparency. Please be advised this is a medical document. Medical documents are intended to carry relevant information, facts as evident, and the clinical opinion of the practitioner. The medical note is intended as peer to peer communication and may appear blunt or direct. It is written in medical language and may contain abbreviations or verbiage that are unfamiliar.

## 2025-02-15 NOTE — PROGRESS NOTES
Patient discharged to home explained all medications and follow  up appt. Patient and his wife verbalized there understanding of teaching.

## 2025-02-15 NOTE — PLAN OF CARE
Patient alert and oriented x4. Afebrile. Still complaining of dysuria and frequency of urination, managed by PRN pain meds with good relief. Maintained on IV antibiotics for UTI. No nausea and vomiting. Denies blood on the urine. Ambulatory. Walks along the hallways. Safety measures in place. Call light in reach.  Will continue Plan of care.     Problem: GENITOURINARY - ADULT  Goal: Absence of urinary retention  Description: INTERVENTIONS:  - Assess patient’s ability to void and empty bladder  - Monitor intake/output and perform bladder scan as needed  - Follow urinary retention protocol/standard of care  - Consider collaborating with pharmacy to review patient's medication profile  - Implement strategies to promote bladder emptying  Outcome: Progressing     Problem: PAIN - ADULT  Goal: Verbalizes/displays adequate comfort level or patient's stated pain goal  Description: INTERVENTIONS:  - Encourage pt to monitor pain and request assistance  - Assess pain using appropriate pain scale  - Administer analgesics based on type and severity of pain and evaluate response  - Implement non-pharmacological measures as appropriate and evaluate response  - Consider cultural and social influences on pain and pain management  - Manage/alleviate anxiety  - Utilize distraction and/or relaxation techniques  - Monitor for opioid side effects  - Notify MD/LIP if interventions unsuccessful or patient reports new pain  - Anticipate increased pain with activity and pre-medicate as appropriate  Outcome: Progressing

## 2025-02-17 NOTE — PAYOR COMM NOTE
--------------PLEASE NOTE THIS WAS AN OBSERVATION STAY  DISCHARGE REVIEW    Payor: [employer] [facility]/HMO/POS/EPO  Subscriber #:  [id number]  Authorization Number: [id number]    Admit date: N/A  Admit time:  N/A  Discharge Date: 2/15/2025 11:58 AM     Admitting Physician: Matthew Rice DO  Attending Physician:  No att. providers found  Primary Care Physician: Carissa Boyd MD

## 2025-02-18 ENCOUNTER — PATIENT OUTREACH (OUTPATIENT)
Dept: CASE MANAGEMENT | Age: 60
End: 2025-02-18

## 2025-02-18 ENCOUNTER — PATIENT MESSAGE (OUTPATIENT)
Dept: FAMILY MEDICINE CLINIC | Facility: CLINIC | Age: 60
End: 2025-02-18

## 2025-02-18 NOTE — PROGRESS NOTES
Please contact patient for assistance with scheduling a follow-up appointment for transitional care clinic, if declines then primary care provider.  Thank you!

## 2025-02-18 NOTE — PROGRESS NOTES
TCM reached out for scheduling assistance.    TCC request.    Attempt #1:  Left message on voicemail for patient to call transitions specialist back to schedule follow up appointments. Provided Transitions specialist scheduling phone number [phone].

## 2025-02-18 NOTE — TELEPHONE ENCOUNTER
I called the patient and made him an appointment for a hospital follow up with Dr. Boyd for 02/21/25 at 11:30 am. Pt. Agreed to plan and verbalized understanding

## 2025-02-19 NOTE — PROGRESS NOTES
TCM reached out for scheduling assistance. (Discharged 02/15)    [facility]  [address]  [place] [address] [phone]  Patient has existing appointment with PCP Fri 02/21 @11:30am    Attempt #2:  Left message on voicemail for patient to call transitions specialist back to schedule follow up appointments. Provided Transitions specialist scheduling phone number [phone].

## 2025-02-20 NOTE — PROGRESS NOTES
TCM reached out for scheduling assistance.    Carissa Boyd MD  Family Medicine  [address] [address]  [phone]  Friday 2/21 @11:30  Existing appointment.    Closing encounter.

## 2025-02-21 ENCOUNTER — OFFICE VISIT (OUTPATIENT)
Dept: FAMILY MEDICINE CLINIC | Facility: CLINIC | Age: 60
End: 2025-02-21
Payer: COMMERCIAL

## 2025-02-21 VITALS
OXYGEN SATURATION: 99 % | WEIGHT: 198.94 LBS | BODY MASS INDEX: 26.37 KG/M2 | HEART RATE: 68 BPM | HEIGHT: 72.84 IN | RESPIRATION RATE: 16 BRPM | SYSTOLIC BLOOD PRESSURE: 124 MMHG | DIASTOLIC BLOOD PRESSURE: 64 MMHG | TEMPERATURE: 97 F

## 2025-02-21 DIAGNOSIS — N18.31 STAGE 3A CHRONIC KIDNEY DISEASE (HCC): ICD-10-CM

## 2025-02-21 DIAGNOSIS — R35.1 BPH ASSOCIATED WITH NOCTURIA: ICD-10-CM

## 2025-02-21 DIAGNOSIS — N40.1 BPH ASSOCIATED WITH NOCTURIA: ICD-10-CM

## 2025-02-21 DIAGNOSIS — N39.0 SEPSIS SECONDARY TO UTI (HCC): ICD-10-CM

## 2025-02-21 DIAGNOSIS — Z09 HOSPITAL DISCHARGE FOLLOW-UP: Primary | ICD-10-CM

## 2025-02-21 DIAGNOSIS — A41.9 SEPSIS SECONDARY TO UTI (HCC): ICD-10-CM

## 2025-02-21 NOTE — PROGRESS NOTES
Subjective:   Jackson Melgar is a 59 year old male who presents for hospital follow up.   He was discharged from [facility] to Home or Self Care  Admission Date: 2/13/25   Discharge Date: 2/15/25  Hospital Discharge Diagnosis: Sepsis secondary to urinary tract infection, stage IIIa CKD, BPH with nocturia    Interactive contact within 2 business days post discharge first initiated on Date: 2/18/25    I accessed [employer] and/or Care Everywhere and personally reviewed the following for the recent hospitalization: provider notes, consults, summaries, labs and other test results and the pertinent findings are documented below.     HPI: Patient presents for hospital follow-up after diagnosis of sepsis.  Patient was seen in primary care office on 2/12/2025 for cystitis with hematuria, was not found to have prostatitis.  Was started on ciprofloxacin.  The following day patient presented to walk-in clinic with high fevers and decreased urine output so was advised to go to the ER.  Patient was admitted for sepsis secondary to UTI.  Urine culture resulted with pansensitive E. coli.  CT abdomen pelvis showed no acute abnormality but did show prominent prostate.  Repeat urine culture showed no growth.  Patient was started on Flomax for BPH.  And was discharged home on 2/15/2025.    Taking finasteride 1mg and flomax 0.4mg daily.    Has one dose of antibiotics left. Yesterday was feeling better, but at the end of the day felt a bit tired. Burning is almost cleared.     Appetite not back to normal. Drinking plenty of water.    Still having an apple a day. Added prunes as well.     No leg swelling, chest pain, shortness of breath, fevers.     History/Other:   Current Medications:  Medication Reconciliation:  I am aware of an inpatient discharge within the last 30 days.  The discharge medication list has been reconciled with the patient's current medication list and reviewed by me. See medication list for additions of new medication, and  changes to current doses of medications and discontinued medications.  Outpatient Medications Marked as Taking for the 2/21/25 encounter (Office Visit) with Carissa Boyd MD   Medication Sig    cefadroxil 500 MG Oral Cap Take 1 capsule (500 mg total) by mouth 2 (two) times daily for 7 days.    tamsulosin 0.4 MG Oral Cap Take 1 capsule (0.4 mg total) by mouth daily.    BREMELANOTIDE ACETATE SC Inject 0.5 mL into the skin every third day as needed. Bremelanotide 10 MG per 2 mL subcutaneous injection    folic acid 1 MG Oral Tab Take 1 tablet (1 mg total) by mouth daily.    Tirzepatide 12.5 MG/0.5ML Subcutaneous Solution Auto-injector Inject 12.5 mg into the skin once a week.    Testosterone Cypionate 200 MG/ML Injection Solution Inject 100 mg into the muscle twice a week.    HCG IJ Inject 24 Units as directed 4 (four) times a week.    methotrexate 2.5 MG Oral Tab Take 5 tablets (12.5 mg total) by mouth once a week.    Tadalafil 20 MG Oral Tab Take 1 tablet (20 mg total) by mouth daily as needed for Erectile Dysfunction.    hydrocortisone (PROCTO-MED HC) 2.5 % External Cream Place 1 Application rectally 2 (two) times daily as needed for Hemorrhoids.    Ubiquinol (QUNOL COQ10/UBIQUINOL/MAREK) 100 MG Oral Cap Take 1 capsule by mouth daily.    zonisamide (ZONEGRAN) 25 MG Oral Cap Take 3 capsules qhs    Adalimumab (HUMIRA PEN) 40 MG/0.4ML Subcutaneous Pen-injector Kit Inject 40 mg into the skin every 14 (fourteen) days.    Lansoprazole 15 MG Oral Tablet Delayed Release Dispersible Take 1 tablet (15 mg total) by mouth daily as needed.    finasteride 1 MG Oral Tab Take 1 tablet (1 mg total) by mouth daily.    Multiple Vitamin (MULTI-DAY VITAMINS) Oral Tab Take 1 tablet by mouth daily.    cetirizine 10 MG Oral Tab Take 1 tablet (10 mg total) by mouth daily.    Omega-3 Fatty Acids (FISH OIL OR) Take 1 capsule by mouth every morning.    Cholecalciferol (VITAMIN D) 2000 UNITS Oral Cap Take 1 capsule (2,000 Units total) by  mouth daily.       Review of Systems:  GENERAL: weight stable, energy stable, no sweating  SKIN: denies any unusual skin lesions  EYES: denies blurred vision or double vision  HEENT: denies nasal congestion, sinus pain or ST  LUNGS: denies shortness of breath with exertion  CARDIOVASCULAR: denies chest pain on exertion or palpitations  GI: denies abdominal pain, denies heartburn, denies diarrhea  MUSCULOSKELETAL: denies pain, normal range of motion of extremities  NEURO: denies headaches, denies dizziness, denies weakness  PSYCHE: denies depression or anxiety  HEMATOLOGIC: denies hx of anemia, or bruising, denies bleeding  ENDOCRINE: denies thyroid history  ALL/ASTHMA: denies hx of allergy or asthma    Objective:   No LMP for male patient.  Estimated body mass index is 26.37 kg/m² as calculated from the following:    Height as of this encounter: 6' 0.84\" (1.85 m).    Weight as of this encounter: 198 lb 15.4 oz (90.2 kg).   /64   Pulse 68   Temp 97.2 °F (36.2 °C) (Temporal)   Resp 16   Ht 6' 0.84\" (1.85 m)   Wt 198 lb 15.4 oz (90.2 kg)   SpO2 99%   BMI 26.37 kg/m²    GENERAL: well developed, well nourished, in no apparent distress  SKIN: no rashes, no suspicious lesions  HEENT: atraumatic, normocephalic, ears and throat are clear  EYES: PERRLA, EOMI, conjunctiva are clear  NECK: supple, no adenopathy, no bruits  CHEST: no chest tenderness  LUNGS: clear to auscultation  CARDIO: RRR without murmur  GI: good BS's, no masses, HSM or tenderness  MUSCULOSKELETAL: back is not tender, FROM of the extremities  EXTREMITIES: no cyanosis, clubbing or edema  NEURO: Oriented times three, cranial nerves are intact, motor and sensory are grossly intact    Assessment & Plan:   1. Hospital discharge follow-up (Primary)  Patient presents for hospital follow up after sepsis 2/2 UTI  - improving  - stay hydrated  - continue flomax  - will refer to urology as well due to severity and swiftness of infection, appreciate  evaluation and recommendations  2. Sepsis secondary to UTI (HCC)  -     Urology Referral - In Network  3. Stage 3a chronic kidney disease (HCC)  4. BPH associated with nocturia        No follow-ups on file.

## 2025-02-24 NOTE — DISCHARGE SUMMARY
[facility]     Jackson Melgar Patient Status:  Observation    1965 MRN [id number]   Location [facility] Attending No att. providers found   Hosp Day # 0 PCP Carissa Boyd MD     Date of Admission: 2025  Date of Discharge: 2/15/2025  Discharge Disposition: Home or Self Care    Admitting Diagnosis:   Sepsis due to urinary tract infection (HCC) [A41.9, N39.0]    Hospital Discharge Diagnoses:   Sepsis secondary to E. coli UTI  Hyponatremia, resolved  Psoriatic arthritis  BPH    Lace+ Score: 37  59-90 High Risk  29-58 Medium Risk  0-28   Low Risk.    TCM Follow-Up Recommendation:  LACE 29-58: Moderate Risk of readmission after discharge from the hospital.        Discharge Diagnosis:   Sepsis secondary to E. coli UTI    History of Present Illness: Jackson Melgar is a 59 year old male with a past medical history of gastric reflux, psoriasis and arthritis.  He was recently diagnosed with anorectal infection and was started on ciprofloxacin.  He is continue to have fevers and chills and several came to the emergency room.  CT of the abdomen emergency room shows enlarged prostate but no other acute abnormalities.  He was started on IV antibiotics.    Brief Synopsis: Patient presented for fevers, chills, found to have sepsis secondary to urinary tract infection.  Urine culture positive for E. coli, tavares susceptible.  Patient improved with IV antibiotics, discharged on oral cefadroxil at discharge. All diagnoses discussed with patient and family, they demonstrated understanding and plan of care and risks reviewed and in agreement.     Procedures during hospitalization:       Incidental or significant findings and recommendations (brief descriptions):      Lab/Test results pending at Discharge:       Consultants:      Discharge Medication List:     Discharge Medications        CHANGE how you take these medications        Instructions Prescription details   zonisamide 25 MG  Caps  Commonly known as: Zonegran  What changed:   how much to take  how to take this  when to take this  additional instructions      Take 3 capsules qhs   Quantity: 270 capsule  Refills: 3            CONTINUE taking these medications        Instructions Prescription details   BREMELANOTIDE ACETATE SC      Inject 0.5 mL into the skin every third day as needed. Bremelanotide 10 MG per 2 mL subcutaneous injection   Refills: 0     cetirizine 10 MG Tabs  Commonly known as: ZyrTEC      Take 1 tablet (10 mg total) by mouth daily.   Refills: 0     finasteride 1 MG Tabs  Commonly known as: PROPECIA      Take 1 tablet (1 mg total) by mouth daily.   Refills: 0     FISH OIL OR      Take 1 capsule by mouth every morning.   Refills: 0     folic acid 1 MG Tabs  Commonly known as: Folvite      Take 1 tablet (1 mg total) by mouth daily.   Refills: 0     HCG IJ      Inject 24 Units as directed 4 (four) times a week.   Refills: 0     Humira (2 Pen) 40 MG/0.4ML Pnkt  Generic drug: Adalimumab      Inject 40 mg into the skin every 14 (fourteen) days.   Quantity: 6 each  Refills: 3     hydrocortisone 2.5 % Crea  Commonly known as: Procto-Med HC      Place 1 Application rectally 2 (two) times daily as needed for Hemorrhoids.   Quantity: 28 g  Refills: 2     lansoprazole 15 MG Tbdd  Commonly known as: Prevacid Solutab      Take 1 tablet (15 mg total) by mouth daily as needed.   Refills: 0     methotrexate 2.5 MG Tabs  Commonly known as: Rheumatrex      Take 5 tablets (12.5 mg total) by mouth once a week.   Refills: 0     Multi-Day Vitamins Tabs      Take 1 tablet by mouth daily.   Refills: 0     Qunol CoQ10/Ubiquinol/Ahmet 100 MG Caps  Generic drug: Ubiquinol      Take 1 capsule by mouth daily.   Refills: 0     Tadalafil 20 MG Tabs      Take 1 tablet (20 mg total) by mouth daily as needed for Erectile Dysfunction.   Quantity: 30 tablet  Refills: 3     tamsulosin 0.4 MG Caps  Commonly known as: Flomax      Take 1 capsule (0.4 mg total) by  mouth daily.   Stop taking on: March 17, 2025  Quantity: 30 capsule  Refills: 0     Testosterone Cypionate 200 MG/ML Soln      Inject 100 mg into the muscle twice a week.   Refills: 0     Tirzepatide 12.5 MG/0.5ML Soaj      Inject 12.5 mg into the skin once a week.   Refills: 0     Vitamin D 50 MCG (2000 UT) Caps      Take 1 capsule (2,000 Units total) by mouth daily.   Refills: 0            STOP taking these medications      ciprofloxacin 500 MG Tabs  Commonly known as: Cipro               ASK your doctor about these medications        Instructions Prescription details   cefadroxil 500 MG Caps  Commonly known as: DURICEF  Ask about: Should I take this medication?      Take 1 capsule (500 mg total) by mouth 2 (two) times daily for 7 days.   Stop taking on: February 22, 2025  Quantity: 14 capsule  Refills: 0               Where to Get Your Medications        These medications were sent to [id number] - [place], [place] - [address] [place] [address] [phone], [phone]  [address] [place] [id number]      Phone: [phone]   cefadroxil 500 MG Caps  tamsulosin 0.4 MG Caps         [place]    Follow-up appointment:   Carissa Boyd MD  [address] [place] [id number] [phone]    Call in 1 week(s)  For follow-up after hospitilization      Vital signs:       Physical Exam:  See exam from day of discharge note  -----------------------------------------------------------------------------------------------  PATIENT DISCHARGE INSTRUCTIONS: See electronic chart    Selvin Avalos MD 2/24/2025    Time spent:  33 minutes

## 2025-02-27 ENCOUNTER — PATIENT MESSAGE (OUTPATIENT)
Dept: FAMILY MEDICINE CLINIC | Facility: CLINIC | Age: 60
End: 2025-02-27

## 2025-05-12 ENCOUNTER — PATIENT MESSAGE (OUTPATIENT)
Dept: FAMILY MEDICINE CLINIC | Facility: CLINIC | Age: 60
End: 2025-05-12

## 2025-05-13 NOTE — TELEPHONE ENCOUNTER
Typically general surgery can evaluate and treat. Dr. Leon or Dr. Wills or any of the surgeons in that group. Thank you.

## 2025-06-06 ENCOUNTER — OFFICE VISIT (OUTPATIENT)
Dept: FAMILY MEDICINE CLINIC | Facility: CLINIC | Age: 60
End: 2025-06-06
Payer: COMMERCIAL

## 2025-06-06 VITALS
WEIGHT: 199 LBS | BODY MASS INDEX: 26.95 KG/M2 | TEMPERATURE: 97 F | SYSTOLIC BLOOD PRESSURE: 136 MMHG | HEIGHT: 72 IN | RESPIRATION RATE: 18 BRPM | HEART RATE: 75 BPM | DIASTOLIC BLOOD PRESSURE: 70 MMHG

## 2025-06-06 DIAGNOSIS — N40.1 BENIGN PROSTATIC HYPERPLASIA WITH NOCTURIA: ICD-10-CM

## 2025-06-06 DIAGNOSIS — Z00.00 HEALTHCARE MAINTENANCE: ICD-10-CM

## 2025-06-06 DIAGNOSIS — Z12.5 SCREENING FOR MALIGNANT NEOPLASM OF PROSTATE: ICD-10-CM

## 2025-06-06 DIAGNOSIS — R35.1 BENIGN PROSTATIC HYPERPLASIA WITH NOCTURIA: ICD-10-CM

## 2025-06-06 PROCEDURE — 99214 OFFICE O/P EST MOD 30 MIN: CPT | Performed by: FAMILY MEDICINE

## 2025-06-06 RX ORDER — TAMSULOSIN HYDROCHLORIDE 0.4 MG/1
0.4 CAPSULE ORAL DAILY
Qty: 90 CAPSULE | Refills: 0 | Status: SHIPPED | OUTPATIENT
Start: 2025-06-06

## 2025-06-06 RX ORDER — TAMSULOSIN HYDROCHLORIDE 0.4 MG/1
1 CAPSULE ORAL DAILY
COMMUNITY
Start: 2025-04-27 | End: 2025-06-06

## 2025-06-06 NOTE — PROGRESS NOTES
[facility] Office Note  Chief Complaint:   Chief Complaint   Patient presents with    Medication Follow-Up       HPI:   This is a 59 year old male coming in for medication follow-up.  The patient states that he has been using the Flomax as prescribed.  He has not had any issues with the medication states that it is helping.  He does have a history of BPH      Past Medical History[1]  Past Surgical History[2]  Social History:  Short Social Hx on File[3]  Family History:  Family History[4]  Allergies:  Allergies[5]  Current Meds:  Current Medications[6]   Counseling given: Not Answered       REVIEW OF SYSTEMS:   Consitutional: No fevers, chills, sweats  Eye: No recent visual problems  ENMT: No ear pain nasal congestion sore throat  Respiratory: No shortness of breath, cough  Cardiovascular: No chest pain palpitations syncope  Gastrointestinal: No nausea vomiting diarrhea  Genitourinary: No hematuria  Hema/Lymph no bruising tendency, swollen lymph glands  Endocrine: Negative for excessive thirst excessive hunger  Musculoskeletal: No back pain neck pain joint pain muscle pain decreased range of motion  Integumentary: No rash, pruritus, abrasions  Neurologic: Alert and oriented x4  Psychiatric: No anxiety, depression    Medical, surgical, family, and social histories were reviewed      EXAM:     VITALS:   Vitals:    06/06/25 0850   BP: 136/70   Pulse: 75   Resp: 18   Temp: 97 °F (36.1 °C)      GENERAL: well developed, well nourished, in no apparent distress  SKIN: no rashes, no suspicious lesions: Cool and Dry  HEENT: atraumatic, normocephalic, ears and throat are clear    Ears: TM's clear and visible bilaterally, no excess cerumen or erythema.   EYES: Pupils equal round and reactive.  Extraocular motions intact no scleral icterus no injection or drainage  THROAT without erythema tonsillar hypertrophy or exudate.  Uvula midline airway patent  NECK: Given midline.  No JVD or lymphadenopathy  supple nontender no meningeal signs   LUNGS: clear to auscultation sounds equal bilaterally no wheezes rales or rhonchi  CARDIO: Regular rate and rhythm without murmurs gallops or rubs      ASSESSMENT AND PLAN:   1. Healthcare maintenance  - CBC With Differential With Platelet; Future  - Comp Metabolic Panel (14); Future  - Lipid Panel; Future  - Triiodothyronine (T3) Total; Future  - TSH and Free T4; Future  Did discuss with the patient at this time would suggest updating blood work he was asked to have a fasting lipid panel CBC CMP free T4 free T3 TSH as well as PSA.  2. Screening for malignant neoplasm of prostate  - PSA Total, Screen; Future    3. Benign prostatic hyperplasia with nocturia  - tamsulosin 0.4 MG Oral Cap; Take 1 capsule (0.4 mg total) by mouth daily.  Dispense: 90 capsule; Refill: 0  Patient was given refills of the Flomax he was asked to continue to take it as prescribed.    Meds & Refills for this Visit:  Requested Prescriptions     Signed Prescriptions Disp Refills    tamsulosin 0.4 MG Oral Cap 90 capsule 0     Sig: Take 1 capsule (0.4 mg total) by mouth daily.       Health Maintenance:  Health Maintenance Due   Topic Date Due    Pneumococcal Vaccine: 50+ Years (1 of 1 - PCV) Never done    TB Screen  07/21/2022    COVID-19 Vaccine (5 - 2024-25 season) 09/01/2024    Annual Physical  04/11/2025       Patient/Caregiver Education: Patient/Caregiver Education: There are no barriers to learning. Medical education done.   Outcome: Patient verbalizes understanding. Patient is notified to call with any questions, complications, allergies, or worsening or changing symptoms.  Patient is to call with any side effects or complications from the treatments as a result of today.     Problem List:  Problem List[7]      No follow-ups on file.     Anthony Le MD    Please note that portions of this note may have been completed with a voice recognition program. Efforts were made to edit the dictations but  occasionally words are mis-transcribed.       [1]   Past Medical History:   Allergic rhinitis    Arthritis    Esophageal reflux    Hemorrhoids    Pain in joints    Psoriasis    Psoriatic arthritis (HCC)    Sleep disturbance    Stress    Tremor    Visual impairment    glasses   [2]   Past Surgical History:  Procedure Laterality Date    Biopsy of thyroid,percut  2009    Colonoscopy  2009    complete    Colonoscopy  2014,2010    x2 Dr. Cast     Colonoscopy      Forearm/wrist surgery unlisted Bilateral left-4/2017 and right-2011--Dr. Landeros    wrist ganglion cyst excision    Hemorrhoidectomy      Hemorrhoidectomy,int/ext,simple      Hernia repair  02/19/2024    Hernia surgery  February 19, 2024    Other surgical history  2004    Upper Gastrointestinal Endoscopy    Tonsillectomy  10/2018    Vasectomy  01/01/1999   [3]   Social History  Socioeconomic History    Marital status:    Tobacco Use    Smoking status: Never    Smokeless tobacco: Never   Vaping Use    Vaping status: Never Used   Substance and Sexual Activity    Alcohol use: Yes     Alcohol/week: 4.0 standard drinks of alcohol     Types: 2 Cans of beer, 2 Standard drinks or equivalent per week     Comment: Sporadic    Drug use: Never   Other Topics Concern    Caffeine Concern No    Stress Concern No    Weight Concern Yes     Comment: Becoming less after dropping 30 lbs    Special Diet No    Exercise Yes     Comment: Walking and resistance training    Seat Belt Yes     Social Drivers of Health     Food Insecurity: No Food Insecurity (2/13/2025)    NCSS - Food Insecurity     Worried About Running Out of Food in the Last Year: No     Ran Out of Food in the Last Year: No   Transportation Needs: No Transportation Needs (2/13/2025)    NCSS - Transportation     Lack of Transportation: No   Housing Stability: Not At Risk (2/13/2025)    NCSS - Housing/Utilities     Has Housing: Yes     Worried About Losing Housing: No     Unable to Get Utilities: No   [4]   Family  History  Problem Relation Age of Onset    Alcohol and Other Disorders Associated Father     Alcohol and Other Disorders Associated Mother     Breast Cancer Mother     Cancer Mother     Colon Cancer Maternal Grandmother     Cancer Maternal Grandmother         Stomach    Cancer Maternal Grandfather         Stomach    Diabetes Sister     Obesity Sister     Heart Attack Brother     Heart Disorder Brother         Heart attack   [5] No Known Allergies  [6]   Current Outpatient Medications   Medication Sig Dispense Refill    tamsulosin 0.4 MG Oral Cap Take 1 capsule (0.4 mg total) by mouth daily. 90 capsule 0    BREMELANOTIDE ACETATE SC Inject 0.5 mL into the skin every third day as needed. Bremelanotide 10 MG per 2 mL subcutaneous injection      folic acid 1 MG Oral Tab Take 1 tablet (1 mg total) by mouth daily.      Tirzepatide 12.5 MG/0.5ML Subcutaneous Solution Auto-injector Inject 12.5 mg into the skin once a week.      Testosterone Cypionate 200 MG/ML Injection Solution Inject 100 mg into the muscle twice a week.      HCG IJ Inject 24 Units as directed 4 (four) times a week.      methotrexate 2.5 MG Oral Tab Take 5 tablets (12.5 mg total) by mouth once a week.      Tadalafil 20 MG Oral Tab Take 1 tablet (20 mg total) by mouth daily as needed for Erectile Dysfunction. 30 tablet 3    hydrocortisone (PROCTO-MED HC) 2.5 % External Cream Place 1 Application rectally 2 (two) times daily as needed for Hemorrhoids. 28 g 2    Ubiquinol (QUNOL COQ10/UBIQUINOL/MAREK) 100 MG Oral Cap Take 1 capsule by mouth daily.      zonisamide (ZONEGRAN) 25 MG Oral Cap Take 3 capsules qhs 270 capsule 3    Adalimumab (HUMIRA PEN) 40 MG/0.4ML Subcutaneous Pen-injector Kit Inject 40 mg into the skin every 14 (fourteen) days. 6 each 3    Lansoprazole 15 MG Oral Tablet Delayed Release Dispersible Take 1 tablet (15 mg total) by mouth daily as needed.      finasteride 1 MG Oral Tab Take 1 tablet (1 mg total) by mouth daily.      Multiple Vitamin  (MULTI-DAY VITAMINS) Oral Tab Take 1 tablet by mouth daily.      cetirizine 10 MG Oral Tab Take 1 tablet (10 mg total) by mouth daily.      Omega-3 Fatty Acids (FISH OIL OR) Take 1 capsule by mouth every morning.      Cholecalciferol (VITAMIN D) 2000 UNITS Oral Cap Take 1 capsule (2,000 Units total) by mouth daily.     [7]   Patient Active Problem List  Diagnosis    Psoriatic arthritis (HCC)    Pain in joint, multiple sites    Encounter for long-term (current) use of other medications    Plaque psoriasis    Unspecified vitamin D deficiency    Decreased libido    Insomnia, unspecified    Other male erectile dysfunction    Psoriatic arthropathy (HCC)    Nontoxic uninodular goiter    Arthropathy, unspecified, site unspecified    Achilles tendinitis    Benign essential tremor syndrome    Encounter for long-term (current) use of high-risk medication    SX/GLOBAL/  /ASC/EXCISION LEFT DORSAL WRIST GANGLION / DOS 04/11/18 / EXP 07/10/18    Contusion of rib on right side, initial encounter    Sprain of right sternoclavicular joint, initial encounter    Reducible right inguinal hernia    Incarcerated right inguinal hernia    Benign prostatic hyperplasia with nocturia    History of adenomatous polyp of colon    Benign neoplasm of cecum    Benign neoplasm of transverse colon    Benign neoplasm of sigmoid colon    Hyponatremia    Sepsis    Stage 3a chronic kidney disease (HCC)    Cystitis

## 2025-06-26 NOTE — PROGRESS NOTES
HPI:     Jackson Melgar is a 59 year old male with a PMH of allergies, GERD, Psoriatic arthritis.  He presents as a consult with:  1. BPH/LUTS  - flomax 2023, 1 mg finasteride  2. UTI + sepsis  - UCx 2/12/25, 7/31/16: E coli  - 4-5 UTIs over the past 10-15 y  3. ED  - 20 mg cialis prn  4. H/o elevated PSA    PCP - Oliver  Nephro Rohan Rushing    Was hospitalized 2/13/25-2/15/25 with sepsis 2/2 UTI.    He feels well. Appetite and energy are good  He is on MTX for Psoriatic arthritis.    Reports several y h/o LUTS which is stable  Prior BPH/OAB meds: flomax since ~ 2023 which partially helps. He also takes 1 mg finasteride since 2020 for MPB    AUA SS is 22/35 with 5 n, f; 4 s, w; 2 u, VINCENZO, 1 I. Mostly unhappy with LUTS.  Incontinence: rare UI/dribbles once a month  Penoscrotal: no abnormalities  ANGELINE: ~ 40 g prostate, no nodules or tenderness    UA is negative and PVR is 34 mL    Gross hematuria: none  Tobacco hx: none  Kidney stone hx: none  Fam h/o  malignancy: none    80% potency with 1/2 tab 20 mg cialis. Fairly happy with this regimen.    Prior PSAs:  - 1.36 6/12/24  - 2.83 5/18/24  - 5.02 4/18/24  - 0.87 1/31/23  - < 1 y prior    Drinks ~ 30-40 oz water, 12 oz coffee with light yellow urine. I encouraged the pt drink at least 40-60 oz water per day or enough to keep urine clear to pale yellow for UTI prevention.    CT SP 2/13/25: BPH    We discussed both doubling flomax and proscar as options for LUTS and reviewed SEs (including low risk of hypotension with flomax and possible sexual side effects with both medications). He would like to try both.    He will increase water intake for UTI prevention. Double flomax and starting proscar for BPH/LUTS. PSA check today. RTC for office cysto for recurrent UTIs. Continue 20 mg cialis prn.    HISTORY:  Past Medical History:    Allergic rhinitis    Arthritis    Esophageal reflux    Hemorrhoids    Pain in joints    Psoriasis    Psoriatic arthritis (HCC)    Sleep  disturbance    Stress    Tremor    Visual impairment    glasses      Past Surgical History:   Procedure Laterality Date    Biopsy of thyroid,percut  2009    Colonoscopy  2009    complete    Colonoscopy  2014,2010    x2 Dr. Cast     Colonoscopy      Forearm/wrist surgery unlisted Bilateral left-4/2017 and right-2011--Dr. Landeros    wrist ganglion cyst excision    Hemorrhoidectomy      Hemorrhoidectomy,int/ext,simple      Hernia repair  02/19/2024    Hernia surgery  February 19, 2024    Other surgical history  2004    Upper Gastrointestinal Endoscopy    Tonsillectomy  10/2018    Vasectomy  01/01/1999      Family History   Problem Relation Age of Onset    Alcohol and Other Disorders Associated Father     Alcohol and Other Disorders Associated Mother     Breast Cancer Mother     Cancer Mother     Colon Cancer Maternal Grandmother     Cancer Maternal Grandmother         Stomach    Cancer Maternal Grandfather         Stomach    Diabetes Sister     Obesity Sister     Heart Attack Brother     Heart Disorder Brother         Heart attack      Social History:   Social History     Socioeconomic History    Marital status:    Tobacco Use    Smoking status: Never    Smokeless tobacco: Never   Vaping Use    Vaping status: Never Used   Substance and Sexual Activity    Alcohol use: Yes     Alcohol/week: 4.0 standard drinks of alcohol     Types: 2 Cans of beer, 2 Standard drinks or equivalent per week     Comment: Sporadic    Drug use: Never    Sexual activity: Yes     Partners: Female   Other Topics Concern    Caffeine Concern No    Stress Concern No    Weight Concern Yes     Comment: Becoming less after dropping 30 lbs    Special Diet No    Exercise Yes     Comment: Walking and resistance training    Seat Belt Yes     Social Drivers of Health     Food Insecurity: No Food Insecurity (2/13/2025)    NCSS - Food Insecurity     Worried About Running Out of Food in the Last Year: No     Ran Out of Food in the Last Year: No    Transportation Needs: No Transportation Needs (2/13/2025)    NCSS - Transportation     Lack of Transportation: No   Housing Stability: Not At Risk (2/13/2025)    NCSS - Housing/Utilities     Has Housing: Yes     Worried About Losing Housing: No     Unable to Get Utilities: No        Medications (Active prior to today's visit):  Current Outpatient Medications   Medication Sig Dispense Refill    Tadalafil (CIALIS) 20 MG Oral Tab Take 1 tablet (20 mg total) by mouth daily as needed for Erectile Dysfunction. 90 tablet 5    tamsulosin 0.4 MG Oral Cap Take 2 capsules (0.8 mg total) by mouth every evening. 180 capsule 5    finasteride 5 MG Oral Tab Take 1 tablet (5 mg total) by mouth daily. 90 tablet 6    BREMELANOTIDE ACETATE SC Inject 0.5 mL into the skin every third day as needed. Bremelanotide 10 MG per 2 mL subcutaneous injection      folic acid 1 MG Oral Tab Take 1 tablet (1 mg total) by mouth daily.      Tirzepatide 12.5 MG/0.5ML Subcutaneous Solution Auto-injector Inject 12.5 mg into the skin once a week.      Testosterone Cypionate 200 MG/ML Injection Solution Inject 100 mg into the muscle twice a week.      HCG IJ Inject 24 Units as directed 4 (four) times a week.      methotrexate 2.5 MG Oral Tab Take 5 tablets (12.5 mg total) by mouth once a week.      Tadalafil 20 MG Oral Tab Take 1 tablet (20 mg total) by mouth daily as needed for Erectile Dysfunction. 30 tablet 3    hydrocortisone (PROCTO-MED HC) 2.5 % External Cream Place 1 Application rectally 2 (two) times daily as needed for Hemorrhoids. 28 g 2    Ubiquinol (QUNOL COQ10/UBIQUINOL/MAREK) 100 MG Oral Cap Take 1 capsule by mouth daily.      zonisamide (ZONEGRAN) 25 MG Oral Cap Take 3 capsules qhs 270 capsule 3    Adalimumab (HUMIRA PEN) 40 MG/0.4ML Subcutaneous Pen-injector Kit Inject 40 mg into the skin every 14 (fourteen) days. 6 each 3    Lansoprazole 15 MG Oral Tablet Delayed Release Dispersible Take 1 tablet (15 mg total) by mouth daily as needed.       Multiple Vitamin (MULTI-DAY VITAMINS) Oral Tab Take 1 tablet by mouth daily.      cetirizine 10 MG Oral Tab Take 1 tablet (10 mg total) by mouth daily.      Omega-3 Fatty Acids (FISH OIL OR) Take 1 capsule by mouth every morning.      Cholecalciferol (VITAMIN D) 2000 UNITS Oral Cap Take 1 capsule (2,000 Units total) by mouth daily.         Allergies:  No Known Allergies      ROS:     A comprehensive 10 point review of systems was completed.  Pertinent positives and negatives noted in the the HPI.    PHYSICAL EXAM:     GENERAL APPEARANCE: well, developed, well nourished, in no acute distress  NEUROLOGIC: nonfocal, alert and oriented  HEAD: normocephalic, atraumatic  EYES: sclera non-icteric  EARS: hearing intact  ORAL CAVITY: mucosa moist  NECK/THYROID: no obvious goiter or masses  LUNGS: nonlabored breathing  ABDOMEN: soft, no obvious masses or tenderness  SKIN: no obvious rashes    : as noted above    ASSESSMENT/PLAN:   Diagnoses and all orders for this visit:    BPH with obstruction/lower urinary tract symptoms  -     URINALYSIS, AUTO, W/O SCOPE  -     tamsulosin 0.4 MG Oral Cap; Take 2 capsules (0.8 mg total) by mouth every evening.  -     finasteride 5 MG Oral Tab; Take 1 tablet (5 mg total) by mouth daily.    Recurrent UTI    Elevated PSA  -     PSA Total, Diagnostic; Future    Erectile dysfunction, unspecified erectile dysfunction type  -     Tadalafil (CIALIS) 20 MG Oral Tab; Take 1 tablet (20 mg total) by mouth daily as needed for Erectile Dysfunction.      - as noted above.    Thanks again for this consult.    Dre Loja MD, FACS  Urologist  [facility]  Office: [phone]

## 2025-07-02 ENCOUNTER — OFFICE VISIT (OUTPATIENT)
Dept: SURGERY | Facility: CLINIC | Age: 60
End: 2025-07-02
Payer: COMMERCIAL

## 2025-07-02 ENCOUNTER — LAB ENCOUNTER (OUTPATIENT)
Dept: LAB | Age: 60
End: 2025-07-02
Attending: UROLOGY
Payer: COMMERCIAL

## 2025-07-02 DIAGNOSIS — R97.20 ELEVATED PSA: ICD-10-CM

## 2025-07-02 DIAGNOSIS — N52.9 ERECTILE DYSFUNCTION, UNSPECIFIED ERECTILE DYSFUNCTION TYPE: ICD-10-CM

## 2025-07-02 DIAGNOSIS — Z00.00 HEALTHCARE MAINTENANCE: ICD-10-CM

## 2025-07-02 DIAGNOSIS — N40.1 BPH WITH OBSTRUCTION/LOWER URINARY TRACT SYMPTOMS: Primary | ICD-10-CM

## 2025-07-02 DIAGNOSIS — N39.0 RECURRENT UTI: ICD-10-CM

## 2025-07-02 DIAGNOSIS — N13.8 BPH WITH OBSTRUCTION/LOWER URINARY TRACT SYMPTOMS: Primary | ICD-10-CM

## 2025-07-02 LAB
ALBUMIN SERPL-MCNC: 4.5 G/DL (ref 3.2–4.8)
ALBUMIN/GLOB SERPL: 1.8 {RATIO} (ref 1–2)
ALP LIVER SERPL-CCNC: 82 U/L (ref 45–117)
ALT SERPL-CCNC: 24 U/L (ref 10–49)
ANION GAP SERPL CALC-SCNC: 8 MMOL/L (ref 0–18)
APPEARANCE: CLEAR
AST SERPL-CCNC: 19 U/L (ref ?–34)
BASOPHILS # BLD AUTO: 0.03 X10(3) UL (ref 0–0.2)
BASOPHILS NFR BLD AUTO: 0.5 %
BILIRUB SERPL-MCNC: 1 MG/DL (ref 0.2–1.1)
BILIRUBIN: NEGATIVE
BUN BLD-MCNC: 17 MG/DL (ref 9–23)
CALCIUM BLD-MCNC: 9.3 MG/DL (ref 8.7–10.6)
CHLORIDE SERPL-SCNC: 106 MMOL/L (ref 98–112)
CHOLEST SERPL-MCNC: 154 MG/DL (ref ?–200)
CO2 SERPL-SCNC: 28 MMOL/L (ref 21–32)
CREAT BLD-MCNC: 1.48 MG/DL (ref 0.7–1.3)
EGFRCR SERPLBLD CKD-EPI 2021: 54 ML/MIN/1.73M2 (ref 60–?)
EOSINOPHIL # BLD AUTO: 0.08 X10(3) UL (ref 0–0.7)
EOSINOPHIL NFR BLD AUTO: 1.3 %
ERYTHROCYTE [DISTWIDTH] IN BLOOD BY AUTOMATED COUNT: 16.4 %
FASTING PATIENT LIPID ANSWER: YES
FASTING STATUS PATIENT QL REPORTED: YES
GLOBULIN PLAS-MCNC: 2.5 G/DL (ref 2–3.5)
GLUCOSE (URINE DIPSTICK): NEGATIVE MG/DL
GLUCOSE BLD-MCNC: 92 MG/DL (ref 70–99)
HCT VFR BLD AUTO: 47.3 % (ref 39–53)
HDLC SERPL-MCNC: 50 MG/DL (ref 40–59)
HGB BLD-MCNC: 15.5 G/DL (ref 13–17.5)
IMM GRANULOCYTES # BLD AUTO: 0.01 X10(3) UL (ref 0–1)
IMM GRANULOCYTES NFR BLD: 0.2 %
KETONES (URINE DIPSTICK): NEGATIVE MG/DL
LDLC SERPL CALC-MCNC: 89 MG/DL (ref ?–100)
LEUKOCYTES: NEGATIVE
LYMPHOCYTES # BLD AUTO: 1.77 X10(3) UL (ref 1–4)
LYMPHOCYTES NFR BLD AUTO: 28 %
MCH RBC QN AUTO: 28.2 PG (ref 26–34)
MCHC RBC AUTO-ENTMCNC: 32.8 G/DL (ref 31–37)
MCV RBC AUTO: 86.2 FL (ref 80–100)
MONOCYTES # BLD AUTO: 0.62 X10(3) UL (ref 0.1–1)
MONOCYTES NFR BLD AUTO: 9.8 %
MULTISTIX LOT#: NORMAL NUMERIC
NEUTROPHILS # BLD AUTO: 3.82 X10 (3) UL (ref 1.5–7.7)
NEUTROPHILS # BLD AUTO: 3.82 X10(3) UL (ref 1.5–7.7)
NEUTROPHILS NFR BLD AUTO: 60.2 %
NITRITE, URINE: NEGATIVE
NONHDLC SERPL-MCNC: 104 MG/DL (ref ?–130)
OCCULT BLOOD: NEGATIVE
OSMOLALITY SERPL CALC.SUM OF ELEC: 295 MOSM/KG (ref 275–295)
PH, URINE: 6.5 (ref 4.5–8)
PLATELET # BLD AUTO: 254 10(3)UL (ref 150–450)
POTASSIUM SERPL-SCNC: 4.3 MMOL/L (ref 3.5–5.1)
PROT SERPL-MCNC: 7 G/DL (ref 5.7–8.2)
PSA SERPL-MCNC: 1.24 NG/ML (ref ?–4)
RBC # BLD AUTO: 5.49 X10(6)UL (ref 4.3–5.7)
SODIUM SERPL-SCNC: 142 MMOL/L (ref 136–145)
SPECIFIC GRAVITY: 1.01 (ref 1–1.03)
T3 SERPL-MCNC: 1.15 NG/ML (ref 0.6–1.81)
T4 FREE SERPL-MCNC: 0.8 NG/DL (ref 0.8–1.7)
TRIGL SERPL-MCNC: 75 MG/DL (ref 30–149)
TSI SER-ACNC: 2.11 UIU/ML (ref 0.55–4.78)
URINE-COLOR: YELLOW
UROBILINOGEN,SEMI-QN: 0.2 MG/DL (ref 0–1.9)
VLDLC SERPL CALC-MCNC: 12 MG/DL (ref 0–30)
WBC # BLD AUTO: 6.3 X10(3) UL (ref 4–11)

## 2025-07-02 PROCEDURE — 84443 ASSAY THYROID STIM HORMONE: CPT

## 2025-07-02 PROCEDURE — 81003 URINALYSIS AUTO W/O SCOPE: CPT | Performed by: UROLOGY

## 2025-07-02 PROCEDURE — 36415 COLL VENOUS BLD VENIPUNCTURE: CPT

## 2025-07-02 PROCEDURE — 99204 OFFICE O/P NEW MOD 45 MIN: CPT | Performed by: UROLOGY

## 2025-07-02 PROCEDURE — 84153 ASSAY OF PSA TOTAL: CPT

## 2025-07-02 PROCEDURE — 80061 LIPID PANEL: CPT

## 2025-07-02 PROCEDURE — 51798 US URINE CAPACITY MEASURE: CPT | Performed by: UROLOGY

## 2025-07-02 PROCEDURE — 84480 ASSAY TRIIODOTHYRONINE (T3): CPT

## 2025-07-02 PROCEDURE — 84439 ASSAY OF FREE THYROXINE: CPT

## 2025-07-02 PROCEDURE — 80053 COMPREHEN METABOLIC PANEL: CPT

## 2025-07-02 PROCEDURE — 85025 COMPLETE CBC W/AUTO DIFF WBC: CPT

## 2025-07-02 RX ORDER — TAMSULOSIN HYDROCHLORIDE 0.4 MG/1
0.8 CAPSULE ORAL EVERY EVENING
Qty: 180 CAPSULE | Refills: 5 | Status: SHIPPED | OUTPATIENT
Start: 2025-07-02

## 2025-07-02 RX ORDER — FINASTERIDE 5 MG/1
5 TABLET, FILM COATED ORAL DAILY
Qty: 90 TABLET | Refills: 6 | Status: SHIPPED | OUTPATIENT
Start: 2025-07-02

## 2025-07-02 RX ORDER — TADALAFIL 20 MG/1
20 TABLET ORAL
Qty: 90 TABLET | Refills: 5 | Status: SHIPPED | OUTPATIENT
Start: 2025-07-02

## 2025-07-02 NOTE — PATIENT INSTRUCTIONS
1. Increase water intake to 40-60 ounces (2 liters) per day or enough to keep urine clear to pale yellow.  2. Try doubling tamsulosin. Stop 1 mg finasteride and start 5 mg finasteride.  3. Check PSA today  4. Office cystoscopy

## 2025-08-01 ENCOUNTER — PROCEDURE (OUTPATIENT)
Dept: SURGERY | Facility: CLINIC | Age: 60
End: 2025-08-01

## 2025-08-01 ENCOUNTER — TELEPHONE (OUTPATIENT)
Dept: SURGERY | Facility: CLINIC | Age: 60
End: 2025-08-01

## 2025-08-01 DIAGNOSIS — R97.20 ELEVATED PSA: ICD-10-CM

## 2025-08-01 DIAGNOSIS — N39.41 URGE INCONTINENCE: ICD-10-CM

## 2025-08-01 DIAGNOSIS — N13.8 BPH WITH OBSTRUCTION/LOWER URINARY TRACT SYMPTOMS: Primary | ICD-10-CM

## 2025-08-01 DIAGNOSIS — N39.0 RECURRENT UTI: ICD-10-CM

## 2025-08-01 DIAGNOSIS — N40.1 BPH WITH OBSTRUCTION/LOWER URINARY TRACT SYMPTOMS: Primary | ICD-10-CM

## 2025-08-01 DIAGNOSIS — N52.9 ERECTILE DYSFUNCTION, UNSPECIFIED ERECTILE DYSFUNCTION TYPE: ICD-10-CM

## 2025-08-01 LAB
APPEARANCE: CLEAR
BILIRUBIN: NEGATIVE
GLUCOSE (URINE DIPSTICK): NEGATIVE MG/DL
LEUKOCYTES: NEGATIVE
MULTISTIX LOT#: ABNORMAL NUMERIC
NITRITE, URINE: NEGATIVE
OCCULT BLOOD: NEGATIVE
PH, URINE: 7 (ref 4.5–8)
SPECIFIC GRAVITY: 1.01 (ref 1–1.03)
URINE-COLOR: YELLOW
UROBILINOGEN,SEMI-QN: 0.2 MG/DL (ref 0–1.9)

## 2025-08-01 PROCEDURE — 99214 OFFICE O/P EST MOD 30 MIN: CPT | Performed by: UROLOGY

## 2025-08-01 PROCEDURE — 52000 CYSTOURETHROSCOPY: CPT | Performed by: UROLOGY

## 2025-08-01 PROCEDURE — 51798 US URINE CAPACITY MEASURE: CPT | Performed by: UROLOGY

## 2025-08-01 PROCEDURE — 81003 URINALYSIS AUTO W/O SCOPE: CPT | Performed by: UROLOGY

## 2025-08-01 RX ORDER — CIPROFLOXACIN 500 MG/1
500 TABLET, FILM COATED ORAL ONCE
Status: COMPLETED | OUTPATIENT
Start: 2025-08-01 | End: 2025-08-01

## 2025-08-01 RX ADMIN — CIPROFLOXACIN 500 MG: 500 TABLET, FILM COATED ORAL at 08:11:00

## 2025-08-28 ENCOUNTER — LABORATORY ENCOUNTER (OUTPATIENT)
Dept: LAB | Age: 60
End: 2025-08-28

## 2025-08-28 DIAGNOSIS — Z01.818 PRE-OP TESTING: ICD-10-CM

## 2025-08-28 LAB
CHLORIDE SERPL-SCNC: 107 MMOL/L (ref 98–112)
CO2 SERPL-SCNC: 25 MMOL/L (ref 21–32)
POTASSIUM SERPL-SCNC: 4.2 MMOL/L (ref 3.5–5.1)
SODIUM SERPL-SCNC: 142 MMOL/L (ref 136–145)

## 2025-08-28 PROCEDURE — 36415 COLL VENOUS BLD VENIPUNCTURE: CPT

## 2025-08-28 PROCEDURE — 80051 ELECTROLYTE PANEL: CPT

## (undated) DEVICE — Device

## (undated) DEVICE — DRAPE TOP 102X53IN ABSRB REINF HK AND LOOP LN

## (undated) DEVICE — DRIVER NDL DIA8MM MEGA SUT CUT XI ENDOWRIST

## (undated) DEVICE — DRAPE EQUIP CLMN ROBOTIC DISP DA VINCI XI

## (undated) DEVICE — DRAPE EXT W21XH19XL10.5IN DA VINCI XI

## (undated) DEVICE — SCISSORS SUR 8MM MPLR CRV ENDOWRIST DA VINCI

## (undated) DEVICE — COVER TIP FOR HOT SHR INSTR DAVINCI ENDOWRIST

## (undated) DEVICE — SEAL CANN 5-8MM FOR DA VINCI XI/X ROBOTIC SYS

## (undated) DEVICE — GLOVE SUR 8 SENSICARE PI PIP CRM PWD F

## (undated) DEVICE — GLOVE ALOETOUCH ORTHO SZ 8

## (undated) DEVICE — SYSTEM POS W20XH1XL29IN PT PIGAZZI

## (undated) DEVICE — NEEDLE VERESS 120MM

## (undated) DEVICE — STICK SPNG 8IN MED POVIDONE IOD PAINT

## (undated) DEVICE — SPECIMEN CONTAINER,POSITIVE SEAL INDICATOR, OR PACKAGED: Brand: PRECISION

## (undated) DEVICE — LAPAROVUE VISIBILITY SYS

## (undated) DEVICE — COVER WAND CLR RF DTECT RF ASSURE

## (undated) DEVICE — OBTURATOR ROBOTIC DIA8MM STD OPT BLDELSS

## (undated) DEVICE — ROBOTIC GENERAL CUSTOM PK

## (undated) DEVICE — COVER LT HNDL RIG FOR SUR CAM DISP

## (undated) DEVICE — SOL  .9 1000ML BTL

## (undated) DEVICE — ENT COBLATOR II, PROCISE XP WAND: Brand: COBLATION

## (undated) DEVICE — TRAY CATH 16FR F INCL BARDX IC COMPLT CARE

## (undated) DEVICE — SUTURE MCRYL 4-0 18IN ABSRB UD 19MM PS-2 3/8

## (undated) DEVICE — SUTURE V-LOC 90 SZ 2-0 L9IN ABSRB VLT L27MM

## (undated) DEVICE — T & A CDS: Brand: MEDLINE INDUSTRIES, INC.

## (undated) DEVICE — ADHESIVE SKIN TOP FOR WND CLSR DERMBND ADV

## (undated) NOTE — LETTER
Date: 7/19/2023    Patient Name: Lesli Wallis          To Whom it may concern: This letter has been written at the patient's request. The above patient was seen at the [facility] for treatment of a medical condition. This patient was seen today in clinic and tested positive for Covid 19.           Sincerely,      Yessi Lugo PA-C

## (undated) NOTE — LETTER
23    Patient: Valarie Wong  : 1965 Visit date: 2023    Dear  Maudry Halsted, APRN    Thank you for referring Valarie Wong to my practice. Please find my assessment and plan below. Assessment   1. Reducible right inguinal hernia          Plan     The patient will be scheduled for robotic right inguinal hernia repair with mesh. The lupis-operative care plan was discussed with the patient, who voices understanding. Activity and lifting recommendations were discussed in length. The risks, benefits, and alternatives to the procedure were explained to the patient. The risks explained include, but are not limited to, bleeding, infection, pain wound complications, recurrence, incorrect diagnosis, injury to adjacent organs and structures. We also discussed the possibile need for further therapeutic, diagnostic, or surgical intervention. The patient voiced understanding, and after all questions were answered to the patient's satisfaction, the patient provided willing and informed consent to proceed.       Sincerely,       Wyatt Diaz MD   CC:   No Recipients

## (undated) NOTE — MR AVS SNAPSHOT
[facility] [address] [facility] [id number] [phone]               Thank you for choosing us for your health care visit with Jenae Kwan MD.  We are glad to serve you and happy to provide you with this summary of your v family member will be picking up prescription, office must be given name of individual in advance and they must present an ID as well. ? The name of the person picking up your prescription must be documented in your chart.   Scheduling Tests    If your phy [address] [id number] ([facility]) [address] [facility]            Jan 30, 2017 10:30 AM   Physical - Established Patient with MD Jessica Traore 26, Jane [place] ([facility])    [address] * Notice: This list has 2 medication(s) that are the same as other medications prescribed for you. Read the directions carefully, and ask your doctor or other care provider to review them with you.             Follow-up Instructions     Return in a

## (undated) NOTE — MR AVS SNAPSHOT
[facility] [address], [address] [address] [phone]               Thank you for choosing us for your health care visit with Rossy Cyr MD.  We are glad to serve you and happy to provide you with this chanel TAKE 1 TABLET BY MOUTH DAILY. What changed:  Another medication with the same name was removed. Continue taking this medication, and follow the directions you see here.    Commonly known as:  FOLVITE           Lansoprazole 15 MG Cpdr   Take 1 capsule (15 [facility] used since July 11, 2013. [employer] PSA reagent used prior to July 11, 2013. The Siemens Total PSA(TPSA)chemiluminescent(LOCI)immunoassay was used.  Results obtained with different test methods or kits rupinder

## (undated) NOTE — Clinical Note
Hi Dr. Daya Devine! I saw Silvia Mackay today with a Covid infection. Symptoms started yesterday and are mild. He was interested in Paxlovid so I started that today. Just wanted to give you the heads up! Hope you are enjoying summer!  Thanks, [employer]
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
## (undated) NOTE — LETTER
Last Revised 02/07/06  Obstructive Sleep Apnea Questionnaire    Clinical signs and symptoms suggesting the possibility of ADALBERTO    1. Predisposing physical characteristics (positive with any of the following present)  ? BMI 35kg/m²  ?  Craniofacial abnormalit pauses which are frightening to the observer, patient regularly falls asleep within minutes after being left unstimulated) in which case they should be treated as though they have severe sleep apnea.     The sleep laboratory’s assessment (none, mild, modera Point Total for B           C. Requirement for postoperative opioids.                Opioid requirement             Points   None 0    Low dose oral opiod 1    High dose oral opioids, parenteral or neuraxial opiods 3      Point Total for C        Estimation